# Patient Record
Sex: MALE | Race: WHITE | NOT HISPANIC OR LATINO
[De-identification: names, ages, dates, MRNs, and addresses within clinical notes are randomized per-mention and may not be internally consistent; named-entity substitution may affect disease eponyms.]

---

## 2020-02-26 PROBLEM — Z00.00 ENCOUNTER FOR PREVENTIVE HEALTH EXAMINATION: Status: ACTIVE | Noted: 2020-02-26

## 2020-02-28 ENCOUNTER — APPOINTMENT (OUTPATIENT)
Dept: VASCULAR SURGERY | Facility: CLINIC | Age: 85
End: 2020-02-28

## 2020-03-09 ENCOUNTER — APPOINTMENT (OUTPATIENT)
Dept: VASCULAR SURGERY | Facility: CLINIC | Age: 85
End: 2020-03-09

## 2020-04-23 ENCOUNTER — INPATIENT (INPATIENT)
Facility: HOSPITAL | Age: 85
LOS: 0 days | Discharge: NURSING, REHAB OR LONG-TERM CARE | DRG: 193 | End: 2020-04-24
Attending: INTERNAL MEDICINE | Admitting: INTERNAL MEDICINE
Payer: MEDICARE

## 2020-04-23 VITALS
WEIGHT: 139.99 LBS | HEART RATE: 78 BPM | TEMPERATURE: 99 F | HEIGHT: 69 IN | SYSTOLIC BLOOD PRESSURE: 127 MMHG | OXYGEN SATURATION: 97 % | RESPIRATION RATE: 17 BRPM | DIASTOLIC BLOOD PRESSURE: 78 MMHG

## 2020-04-23 DIAGNOSIS — J96.01 ACUTE RESPIRATORY FAILURE WITH HYPOXIA: ICD-10-CM

## 2020-04-23 DIAGNOSIS — I50.9 HEART FAILURE, UNSPECIFIED: ICD-10-CM

## 2020-04-23 DIAGNOSIS — N18.9 CHRONIC KIDNEY DISEASE, UNSPECIFIED: ICD-10-CM

## 2020-04-23 DIAGNOSIS — Z98.890 OTHER SPECIFIED POSTPROCEDURAL STATES: Chronic | ICD-10-CM

## 2020-04-23 DIAGNOSIS — E78.5 HYPERLIPIDEMIA, UNSPECIFIED: ICD-10-CM

## 2020-04-23 DIAGNOSIS — R09.89 OTHER SPECIFIED SYMPTOMS AND SIGNS INVOLVING THE CIRCULATORY AND RESPIRATORY SYSTEMS: ICD-10-CM

## 2020-04-23 LAB
ALBUMIN SERPL ELPH-MCNC: 3.9 G/DL — SIGNIFICANT CHANGE UP (ref 3.3–5)
ALP SERPL-CCNC: 66 U/L — SIGNIFICANT CHANGE UP (ref 40–120)
ALT FLD-CCNC: 28 U/L — SIGNIFICANT CHANGE UP (ref 10–45)
ANION GAP SERPL CALC-SCNC: 12 MMOL/L — SIGNIFICANT CHANGE UP (ref 5–17)
APTT BLD: 29.2 SEC — SIGNIFICANT CHANGE UP (ref 27.5–36.3)
AST SERPL-CCNC: 30 U/L — SIGNIFICANT CHANGE UP (ref 10–40)
BASOPHILS # BLD AUTO: 0 K/UL — SIGNIFICANT CHANGE UP (ref 0–0.2)
BASOPHILS NFR BLD AUTO: 0 % — SIGNIFICANT CHANGE UP (ref 0–2)
BILIRUB DIRECT SERPL-MCNC: <0.2 MG/DL — SIGNIFICANT CHANGE UP (ref 0–0.2)
BILIRUB INDIRECT FLD-MCNC: >0.1 MG/DL — LOW (ref 0.2–1)
BILIRUB SERPL-MCNC: 0.3 MG/DL — SIGNIFICANT CHANGE UP (ref 0.2–1.2)
BUN SERPL-MCNC: 28 MG/DL — HIGH (ref 7–23)
CALCIUM SERPL-MCNC: 9 MG/DL — SIGNIFICANT CHANGE UP (ref 8.4–10.5)
CHLORIDE SERPL-SCNC: 104 MMOL/L — SIGNIFICANT CHANGE UP (ref 96–108)
CO2 SERPL-SCNC: 22 MMOL/L — SIGNIFICANT CHANGE UP (ref 22–31)
CREAT SERPL-MCNC: 1.5 MG/DL — HIGH (ref 0.5–1.3)
EOSINOPHIL # BLD AUTO: 0 K/UL — SIGNIFICANT CHANGE UP (ref 0–0.5)
EOSINOPHIL NFR BLD AUTO: 0 % — SIGNIFICANT CHANGE UP (ref 0–6)
GLUCOSE SERPL-MCNC: 121 MG/DL — HIGH (ref 70–99)
HCT VFR BLD CALC: 38.9 % — LOW (ref 39–50)
HGB BLD-MCNC: 12.6 G/DL — LOW (ref 13–17)
INR BLD: 0.98 — SIGNIFICANT CHANGE UP (ref 0.88–1.16)
LYMPHOCYTES # BLD AUTO: 0.88 K/UL — LOW (ref 1–3.3)
LYMPHOCYTES # BLD AUTO: 8 % — LOW (ref 13–44)
MCHC RBC-ENTMCNC: 30.3 PG — SIGNIFICANT CHANGE UP (ref 27–34)
MCHC RBC-ENTMCNC: 32.4 GM/DL — SIGNIFICANT CHANGE UP (ref 32–36)
MCV RBC AUTO: 93.5 FL — SIGNIFICANT CHANGE UP (ref 80–100)
MONOCYTES # BLD AUTO: 0.28 K/UL — SIGNIFICANT CHANGE UP (ref 0–0.9)
MONOCYTES NFR BLD AUTO: 2.6 % — SIGNIFICANT CHANGE UP (ref 2–14)
NEUTROPHILS # BLD AUTO: 9.5 K/UL — HIGH (ref 1.8–7.4)
NEUTROPHILS NFR BLD AUTO: 85 % — HIGH (ref 43–77)
PLATELET # BLD AUTO: 208 K/UL — SIGNIFICANT CHANGE UP (ref 150–400)
POTASSIUM SERPL-MCNC: 4.2 MMOL/L — SIGNIFICANT CHANGE UP (ref 3.5–5.3)
POTASSIUM SERPL-SCNC: 4.2 MMOL/L — SIGNIFICANT CHANGE UP (ref 3.5–5.3)
PROCALCITONIN SERPL-MCNC: 0.07 NG/ML — SIGNIFICANT CHANGE UP (ref 0.02–0.1)
PROT SERPL-MCNC: 7.2 G/DL — SIGNIFICANT CHANGE UP (ref 6–8.3)
PROTHROM AB SERPL-ACNC: 11.2 SEC — SIGNIFICANT CHANGE UP (ref 10–12.9)
RBC # BLD: 4.16 M/UL — LOW (ref 4.2–5.8)
RBC # FLD: 15.2 % — HIGH (ref 10.3–14.5)
SARS-COV-2 RNA SPEC QL NAA+PROBE: SIGNIFICANT CHANGE UP
SODIUM SERPL-SCNC: 138 MMOL/L — SIGNIFICANT CHANGE UP (ref 135–145)
WBC # BLD: 10.95 K/UL — HIGH (ref 3.8–10.5)
WBC # FLD AUTO: 10.95 K/UL — HIGH (ref 3.8–10.5)

## 2020-04-23 PROCEDURE — 93010 ELECTROCARDIOGRAM REPORT: CPT

## 2020-04-23 PROCEDURE — 99285 EMERGENCY DEPT VISIT HI MDM: CPT | Mod: CS

## 2020-04-23 PROCEDURE — 71250 CT THORAX DX C-: CPT | Mod: 26

## 2020-04-23 PROCEDURE — 99223 1ST HOSP IP/OBS HIGH 75: CPT | Mod: GC

## 2020-04-23 PROCEDURE — 70490 CT SOFT TISSUE NECK W/O DYE: CPT | Mod: 26

## 2020-04-23 RX ORDER — FAMOTIDINE 10 MG/ML
20 INJECTION INTRAVENOUS DAILY
Refills: 0 | Status: DISCONTINUED | OUTPATIENT
Start: 2020-04-23 | End: 2020-04-23

## 2020-04-23 RX ORDER — PIPERACILLIN AND TAZOBACTAM 4; .5 G/20ML; G/20ML
3.38 INJECTION, POWDER, LYOPHILIZED, FOR SOLUTION INTRAVENOUS ONCE
Refills: 0 | Status: COMPLETED | OUTPATIENT
Start: 2020-04-23 | End: 2020-04-23

## 2020-04-23 RX ORDER — FAMOTIDINE 10 MG/ML
20 INJECTION INTRAVENOUS EVERY 24 HOURS
Refills: 0 | Status: DISCONTINUED | OUTPATIENT
Start: 2020-04-24 | End: 2020-04-24

## 2020-04-23 RX ORDER — FAMOTIDINE 10 MG/ML
10 INJECTION INTRAVENOUS DAILY
Refills: 0 | Status: DISCONTINUED | OUTPATIENT
Start: 2020-04-23 | End: 2020-04-23

## 2020-04-23 RX ORDER — ENOXAPARIN SODIUM 100 MG/ML
40 INJECTION SUBCUTANEOUS EVERY 24 HOURS
Refills: 0 | Status: DISCONTINUED | OUTPATIENT
Start: 2020-04-23 | End: 2020-04-24

## 2020-04-23 RX ADMIN — PIPERACILLIN AND TAZOBACTAM 200 GRAM(S): 4; .5 INJECTION, POWDER, LYOPHILIZED, FOR SOLUTION INTRAVENOUS at 20:10

## 2020-04-23 RX ADMIN — ENOXAPARIN SODIUM 40 MILLIGRAM(S): 100 INJECTION SUBCUTANEOUS at 23:46

## 2020-04-23 NOTE — H&P ADULT - NSICDXPASTMEDICALHX_GEN_ALL_CORE_FT
PAST MEDICAL HISTORY:  BPH (benign prostatic hyperplasia)     CKD (chronic kidney disease)     Heart failure     HLD (hyperlipidemia)     PVD (peripheral vascular disease)

## 2020-04-23 NOTE — H&P ADULT - PROBLEM SELECTOR PLAN 6
home rosuvastatin 10 mg QD on hold   -continue to trend LFT's     DVT ppx: lovenox   Dispo: consult SW in am, f/u speech and swallow evaluation and ENT recs home rosuvastatin 10 mg QD on hold   -continue to trend LFT's     DVT ppx: lovenox   GI ppx: pepcid 10 mg IV QD (renal dosing)   Diet: NPO until speech and swallow evaluation   Dispo: consult [facility]ORK[facility] in am, f/u speech and swallow evaluation and ENT recs

## 2020-04-23 NOTE — H&P ADULT - NSHPREVIEWOFSYSTEMS_GEN_ALL_CORE
Fevers: N  Malaise: N  Myalgias: N  SOB: N          At rest: N         With exertion: N         At baseline: N  Cough: N         Productive: N  Nausea: N  Vomiting: N  Diarrhea: N

## 2020-04-23 NOTE — ED PROVIDER NOTE - CLINICAL SUMMARY MEDICAL DECISION MAKING FREE TEXT BOX
Pt presents s/p ? FB in throat, denies bones, possible aspiration. Hypoxic on RA w/o other signs of resp distress, reports flu-like sx. DDx includes but not limited to retained FB, aspiration, COVID19 infection, dysphagia, less likely other acute pathology. Check labs, CT neck / chest, COVID testing. Dispo pending w/u and clinical status

## 2020-04-23 NOTE — H&P ADULT - PROBLEM SELECTOR PLAN 2
-CT neck: Approximately 2 to 3 mm well-corticated bony density within the right lingual tonsil which may represent potential foreign body versus lingual tonsillar calcification.  -speech and swallow evaluation in am  -ENT following and NTD, observation and no scope indicated at this time since pt currently asymptomatic and at baseline - Isolation precautions; contact and isolation  - covid-19 PCR negative x 1, f/u repeat COVID PCR   - Monitor O2 saturation, monitor for respiratory distress  - Nasal cannula as needed, avoid HFNC/BiPAP  - F/u D-Dimer,  Procalcitonin, CRP  - F/u Quantiferon and G6PD  - holding on initiation of azithromycin/plaquenil at this time   - consider toci based on inflammatory marker trend - Isolation precautions; contact and isolation  - covid-19 PCR negative x 2, repeat COVID PCR in AM  - Monitor O2 saturation, monitor for respiratory distress  - Nasal cannula as needed, avoid HFNC/BiPAP  - F/u D-Dimer,  Procalcitonin, CRP  - F/u Quantiferon and G6PD  - holding on initiation off on plaquenil at this time   - consider toci based on inflammatory marker trend

## 2020-04-23 NOTE — ED ADULT TRIAGE NOTE - HEART RATE (BEATS/MIN)
78 Implemented All Fall with Harm Risk Interventions:  [place] to call system. Call bell, personal items and telephone within reach. Instruct patient to call for assistance. Room bathroom lighting operational. Non-slip footwear when patient is off stretcher. Physically safe environment: no spills, clutter or unnecessary equipment. Stretcher in lowest position, wheels locked, appropriate side rails in place. Provide visual cue, wrist band, yellow gown, etc. Monitor gait and stability. Monitor for mental status changes and reorient to person, place, and time. Review medications for side effects contributing to fall risk. Reinforce activity limits and safety measures with patient and family. Provide visual clues: red socks.

## 2020-04-23 NOTE — H&P ADULT - NSHPLABSRESULTS_GEN_ALL_CORE
12.6   10.95 )-----------( 208      ( 23 Apr 2020 17:40 )             38.9       04-23    138  |  104  |  28<H>  ----------------------------<  121<H>  4.2   |  22  |  1.50<H>    Ca    9.0      23 Apr 2020 17:40        PT/INR - ( 23 Apr 2020 17:40 )   PT: 11.2 sec;   INR: 0.98          PTT - ( 23 Apr 2020 17:40 )  PTT:29.2 sec              EKG:

## 2020-04-23 NOTE — H&P ADULT - PROBLEM SELECTOR PLAN 5
c/w home   -continue to f/u LFTS    DVT ppx:   Dispo: consult [facility]ORK[facility] in am, f/u speech and swallow evaluation and ENT recs Cr 1.5 on admit  -Baseline Cr 1.3  -avoid nephrotoxic agents  -continue to trend CMP  -f/u renal US and post void residual EF unknown  -euvolemic on exam  -core measures, daily weight, strict I& O's

## 2020-04-23 NOTE — ED ADULT NURSE NOTE - OBJECTIVE STATEMENT
pt was eating chicken a few hours ago and felt something stick in his throat , no difficulty breathing ,able to swallow but feels uncomfortable

## 2020-04-23 NOTE — ED ADULT TRIAGE NOTE - CHIEF COMPLAINT QUOTE
Patient, speaking in full sentences, BIBA from [facility], complaining of foreign body in throat.  Per EMS, patient was eating chicken and began to choke and subsequently vomited.  Per EMS, patient was 90% O2 sat on RA, placed on 2L NC.

## 2020-04-23 NOTE — ED PROVIDER NOTE - PHYSICAL EXAMINATION
Limited PE performed in the setting of the COVID10 pandemic, in efforts to limit exposure and cross-contamination  Constitutional: Well appearing, well nourished, awake, alert, oriented to person, place, time/situation and in no apparent distress.  ENMT: Airway patent. No erythema or exudates in oropharynx. No tongue / lip / uvula / pharyngeal / sublingual edema. No oral lesions. Uvula is midline. No drooling or stridor. Normal phonation.   Eyes: Clear bilaterally  Cardiac: Normal rate, regular rhythm.   Respiratory: No increased WOB, tachypnea, hypoxia, or accessory mm use. Pt speaks in full sentences.   Gastrointestinal: Abd soft, NT, ND. No guarding, rebound, or rigidity.   Musculoskeletal: Range of motion is not limited  Neuro: Alert and oriented x 3, face symmetric and speech fluent. Nml gross motor movement, grossly non focal   Skin: Skin normal color for race, warm, dry and intact. No evidence of rash.  Psych: Alert and oriented to person, place, time/situation. normal mood and affect. no apparent risk to self or others. Limited PE performed in the setting of the COVID10 pandemic, in efforts to limit exposure and cross-contamination  Constitutional: Well appearing, well nourished, awake, alert, oriented to person, place, time/situation and in no apparent distress.  ENMT: Airway patent. No erythema or exudates in oropharynx. No tongue / lip / uvula / pharyngeal / sublingual edema. No oral lesions. Uvula is midline. No drooling or stridor. Normal phonation.   Eyes: Clear bilaterally  Cardiac: Normal rate, regular rhythm.   Respiratory: No increased WOB, tachypnea, or accessory mm use. Pt speaks in full sentences. O2 sat on RA 91%, > 96% w/ NC 2 L  Gastrointestinal: Abd soft, NT, ND. No guarding, rebound, or rigidity.   Musculoskeletal: Range of motion is not limited  Neuro: Alert and oriented x 3, face symmetric and speech fluent. Nml gross motor movement, grossly non focal   Skin: Skin normal color for race, warm, dry and intact. No evidence of rash.  Psych: Alert and oriented to person, place, time/situation. normal mood and affect. no apparent risk to self or others.

## 2020-04-23 NOTE — ED PROVIDER NOTE - NS ED ROS FT
Constitutional: No fever or chills.   Eyes: No pain, blurry vision, or discharge.  ENMT: See HPI  Cardiac: No chest pain, SOB or edema. No chest pain with exertion.  Respiratory: No cough or respiratory distress. No hemoptysis. No history of asthma or RAD.  GI: No nausea, vomiting, diarrhea or abdominal pain.  : No dysuria, frequency or burning.  MS: No muscle weakness, joint pain or back pain. See HPI  Neuro: No headache or weakness. No LOC.  Skin: No skin rash.   Endocrine: No history of thyroid disease or diabetes.  Except as documented in the HPI, all other systems are negative.

## 2020-04-23 NOTE — H&P ADULT - ASSESSMENT
85 Y M former smoker with PMHx PVD, HLD, BPH, CKD (baseline Cr 1.3), CHF (EF unknown, pt denies h/o CHF and reports normal ECHO) BIBA from [facility] (permanent resident since 7/2019) w/ FB sensation in throat.  EMS noted on arrival pt vomited and had O2 sat 90%.  Denies odynophagia, dysphagia, SOB, N/V/D, fever, chils, cough, abdominal pain, CP, LE edema, melena or hematuria at this time. Pt also reported 1 week ago he had diffuse bodyaches, w/o other sx, and states he thought he had the flu but was not tested for flu or COVID. CT chest significant for ground glass opacities c/w viral PNA in setting of COVID infection. Pt admitted to [facility] for r/o COVID and speech and swallow eval. Pt is COVID negative x1 and pending repeat COVID PCR. 85 Y M former smoker with PMHx PVD, HLD, BPH, CKD (baseline Cr 1.3), CHF (EF unknown, pt denies h/o CHF and reports normal ECHO) BIBA from [facility] (permanent resident since 7/2019) w/ FB sensation in throat.  EMS noted on arrival pt vomited and had O2 sat 90%.  Denies odynophagia, dysphagia, SOB, N/V/D, fever, chills, cough, abdominal pain, CP, LE edema, melena or hematuria at this time. Pt also reported 1 week ago he had diffuse bodyaches, w/o other sx, and states he thought he had the flu but was not tested for flu or COVID. CT chest significant for ground glass opacities c/w viral PNA in setting of COVID infection. Pt admitted to [facility] for r/o COVID and speech and swallow eval. Pt is COVID negative x1 and pending repeat COVID PCR.

## 2020-04-23 NOTE — H&P ADULT - NSHPSOCIALHISTORY_GEN_ALL_CORE
tobacco, vaping  Etoh  Drug use  [facility] former smoker quit >60 yrs ago   denies ETOH and Drug use  [facility] resident since 7/2019

## 2020-04-23 NOTE — ED PROVIDER NOTE - OBJECTIVE STATEMENT
Pt w/ PMHx PVD, HLD, BPH, CKD, CHF BIBA from [facility] w/ FB sensation in throat. Pt reports he was eating lunch 2-3 hours PTA. Pt reports he was eating cut up chicken in a salad. Pt states there were no bones. Pt felt the chicken get stuck in her throat and felt like he was coughing. He reports suction was used on him and a little chicken came up and he gagged, but denies vomiting. [facility] paperwork does not denote this. [facility] paperwork denotes O2 sat 95% and that pt requested to come to the hospital. EMS noted on arrival pt vomited and had O2 sat 90%. Pt reports since arrival to the ED he is feeling a lot better. Denies odynophagia, dysphagia, and SOB at this time. Pt is able to tolerated drinking water w/o difficulty. Pt reports he has had similar episodes a few times in the past month. Pt reports 1 week ago he had diffuse bodyaches, w/o other sx, and states he thought he had the flu. Pt reports he was never told the dx. Denies f/c, cough, CP, SOB at this time.

## 2020-04-23 NOTE — H&P ADULT - HISTORY OF PRESENT ILLNESS
85 Y M with PMHx PVD, HLD, BPH, CKD (baseline Cr, CHF BIBA from [facility] w/ FB sensation in throat. Pt reports he was eating lunch 2-3 hours prior to arrival. Pt reports he was eating cut up chicken in a salad. Pt states there were no bones. Pt felt the chicken get stuck in his throat and felt like he was coughing. He reports suction was used on him and a little chicken came up and he gagged, but denies vomiting. [facility] paperwork does not denote this. [facility] paperwork denotes O2 sat 95% and that pt requested to come to the hospital. EMS noted on arrival pt vomited and had O2 sat 90%. Pt reports since arrival to the ED he is feeling a lot better. Denies odynophagia, dysphagia, and SOB at this time. Pt is able to tolerated drinking water w/o difficulty. Pt reports he has had similar episodes a few times in the past month. Pt reports 1 week ago he had diffuse bodyaches, w/o other sx, and states he thought he had the flu. Pt reports he was never told the dx. Denies f/c, cough, CP, SOB at this time.    VS on arrival: T 98.6, HR 78, /78, O2 91% on RA, 97% on 2L NC, RR 17  Significant Labs: WBC 10.95, Neutrophils 85 %, Lymphocutes 8%, Bun 28, Cr 1.5, D-dimer 259, Ferritin 708, CRP 0.21  CXR: infiltrates   CT chest: No radiopaque foreign body identified. Groundglass opacities in the right upper, middle and to a lesser extent the left lower lobes, which would be atypical distribution for aspiration pneumonia. This pattern could represent atypical pneumonia/viral infection from atypical agents including COVID-19. Clinical and laboratory correlation is advised. Partially imaged mild to moderate hydronephrosis of the right kidney, the etiology of which is not seen on this exam.  CT neck: Approximately 2 to 3 mm well-corticated bony density within the right lingual tonsil which may represent potential foreign body versus lingual tonsillar calcification.  EKG: 85 Y M with PMHx PVD, HLD, BPH, CKD (baseline Cr unkonwn), CHF (EF unknown) BIBA from [facility] w/ FB sensation in throat. Pt reports he was eating lunch 2-3 hours prior to arrival. Pt reports he was eating cut up chicken in a salad. Pt states there were no bones. Pt felt the chicken get stuck in his throat and felt like he was coughing. He reports suction was used on him and a little chicken came up and he gagged, but denies vomiting. [facility] paperwork does not denote this. [facility] paperwork denotes O2 sat 95% and that pt requested to come to the hospital. EMS noted on arrival pt vomited and had O2 sat 90%. Pt reports since arrival to the ED he is feeling a lot better. Denies odynophagia, dysphagia, and SOB at this time. Pt is able to tolerate drinking water w/o difficulty. Pt reports he has had similar episodes a few times in the past month. Pt reports 1 week ago he had diffuse bodyaches, w/o other sx, and states he thought he had the flu but was not tested for flu or COVID. Denies f/c, cough, CP, SOB at this time. Pt admitted to [facility] for r/o COVID and now COVID negative.     VS on arrival: T 98.6, HR 78, /78, O2 91% on RA, 97% on 2L NC, RR 17  Significant Labs: WBC 10.95, Neutrophils 85 %, Lymphocytes 8%, Bun 28, Cr 1.5, D-dimer 259, Ferritin 708, CRP 0.21  CXR: infiltrates per ED reads   CT chest: No radiopaque foreign body identified. Groundglass opacities in the right upper, middle and to a lesser extent the left lower lobes, which would be atypical distribution for aspiration pneumonia. This pattern could represent atypical pneumonia/viral infection from atypical agents including COVID-19. Clinical and laboratory correlation is advised. Partially imaged mild to moderate hydronephrosis of the right kidney, the etiology of which is not seen on this exam.  CT neck: Approximately 2 to 3 mm well-corticated bony density within the right lingual tonsil which may represent potential foreign body versus lingual tonsillar calcification.  EKG: NSR @ 72 bpm with  85 Y M with PMHx PVD, HLD, BPH, CKD (baseline Cr 1.3), CHF (EF unknown, pt denies h/o CHF and reports normal ECHO) BIBA from [facility] w/ FB sensation in throat. Pt reports he was eating lunch 2-3 hours prior to arrival. Pt reports he was eating cut up chicken in a salad. Pt states there were no bones. Pt felt the chicken get stuck in his throat and felt like he was coughing. He reports suction was used on him and a little chicken came up and he gagged, but denies vomiting. [facility] paperwork does not denote this. [facility] paperwork denotes O2 sat 95% and that pt requested to come to the hospital. EMS noted on arrival pt vomited and had O2 sat 90%. Pt reports since arrival to the ED he is feeling a lot better. Denies odynophagia, dysphagia, and SOB at this time. Pt is able to tolerate drinking water w/o difficulty. Pt reports he has had similar episodes a few times in the past month. Pt reports 1 week ago he had diffuse bodyaches, w/o other sx, and states he thought he had the flu but was not tested for flu or COVID. Denies f/c, cough, CP, SOB at this time. Pt admitted to [facility] for r/o COVID and now COVID negative.     VS on arrival: T 98.6, HR 78, /78, O2 91% on RA, 97% on 2L NC, RR 17  Significant Labs: WBC 10.95, Neutrophils 85 %, Lymphocytes 8%, Bun 28, Cr 1.5, D-dimer 259, Ferritin 708, CRP 0.21  CXR: infiltrates per ED reads   CT chest: No radiopaque foreign body identified. Groundglass opacities in the right upper, middle and to a lesser extent the left lower lobes, which would be atypical distribution for aspiration pneumonia. This pattern could represent atypical pneumonia/viral infection from atypical agents including COVID-19. Clinical and laboratory correlation is advised. Partially imaged mild to moderate hydronephrosis of the right kidney, the etiology of which is not seen on this exam.  CT neck: Approximately 2 to 3 mm well-corticated bony density within the right lingual tonsil which may represent potential foreign body versus lingual tonsillar calcification.  EKG: NSR @ 72 bpm with  85 Y M former smoker with PMHx PVD, HLD, BPH, CKD (baseline Cr 1.3), CHF (EF unknown, pt denies h/o CHF and reports normal ECHO) BIBA from [facility] (permanent resident since 7/2019) w/ FB sensation in throat. Pt reports he was eating lunch 2-3 hours prior to arrival. Pt reports he was eating cut up chicken (without bones) in a salad and felt the chicken get stuck in his throat and started coughing. He reports suction was used on him and a little chicken came up and he gagged, but denies vomiting. [facility] paperwork does not denote this. [facility] paperwork denotes O2 sat 95% and that pt requested to come to the hospital. EMS noted on arrival pt vomited and had O2 sat 90%. Pt reports since arrival to the ED he is feeling a lot better. Denies odynophagia, dysphagia, SOB, N/V/D, fever, chils, cough, abdominal pain, CP, LE edema, melena or hematuria at this time. Pt has not attempted to swallow anything since choking episode. Pt reports he has had similar episodes a few times in the past month that he would clear and swallow within seconds.  Pt also reported 1 week ago he had diffuse bodyaches, w/o other sx, and states he thought he had the flu but was not tested for flu or COVID. Pt admitted to [facility] for r/o COVID and now COVID negative pending repeat COVID PCR.     VS on arrival: T 98.6, HR 78, /78, O2 91% on RA, 97% on 2L NC, RR 17  Significant Labs: WBC 10.95, Neutrophils 85 %, Lymphocytes 8%, Bun 28, Cr 1.5, D-dimer 259, Ferritin 708, CRP 0.21   CT chest: No radiopaque foreign body identified. Groundglass opacities in the right upper, middle and to a lesser extent the left lower lobes, which would be atypical distribution for aspiration pneumonia. This pattern could represent atypical pneumonia/viral infection from atypical agents including COVID-19. Clinical and laboratory correlation is advised. Partially imaged mild to moderate hydronephrosis of the right kidney, the etiology of which is not seen on this exam.  CT neck: Approximately 2 to 3 mm well-corticated bony density within the right lingual tonsil which may represent potential foreign body versus lingual tonsillar calcification.  EKG: NSR @ 72 bpm with   Treatment: s/p zosyn 3.375 mg IV x1 85 Y M former smoker with PMHx PVD, HLD, BPH, CKD (baseline Cr 1.3), CHF (EF unknown, pt denies h/o CHF and reports normal ECHO) BIBA from [facility] (permanent resident since 7/2019) w/ FB sensation in throat. Pt reports he was eating lunch 2-3 hours prior to arrival. Pt reports he was eating cut up chicken (without bones) in a salad and felt the chicken get stuck in his throat and started coughing. He reports suction was used on him and a little chicken came up and he gagged, but denies vomiting. [facility] paperwork does not denote this. [facility] paperwork denotes O2 sat 95% and that pt requested to come to the hospital. EMS noted on arrival pt vomited and had O2 sat 90%. Pt reports since arrival to the ED he is feeling a lot better. Denies odynophagia, dysphagia, SOB, N/V/D, fever, chills, cough, abdominal pain, CP, LE edema, melena or hematuria at this time. Pt has not attempted to swallow anything since choking episode. Pt reports he has had similar episodes a few times in the past month that he would clear and swallow within seconds.  Pt also reported 1 week ago he had diffuse bodyaches, w/o other sx, and states he thought he had the flu but was not tested for flu or COVID. Pt admitted to [facility] for r/o COVID and now COVID negative pending repeat COVID PCR.     VS on arrival: T 98.6, HR 78, /78, O2 91% on RA, 97% on 2L NC, RR 17  Significant Labs: WBC 10.95, Neutrophils 85 %, Lymphocytes 8%, Bun 28, Cr 1.5, D-dimer 259, Ferritin 708, CRP 0.21   CT chest: No radiopaque foreign body identified. Groundglass opacities in the right upper, middle and to a lesser extent the left lower lobes, which would be atypical distribution for aspiration pneumonia. This pattern could represent atypical pneumonia/viral infection from atypical agents including COVID-19. Clinical and laboratory correlation is advised. Partially imaged mild to moderate hydronephrosis of the right kidney, the etiology of which is not seen on this exam.  CT neck: Approximately 2 to 3 mm well-corticated bony density within the right lingual tonsil which may represent potential foreign body versus lingual tonsillar calcification.  EKG: NSR @ 72 bpm with   Treatment: s/p zosyn 3.375 mg IV x1

## 2020-04-23 NOTE — H&P ADULT - PROBLEM SELECTOR PLAN 3
EF unknown  -euvolemic on exam  -core measures, daily weight, strict I& O's pt reports ongoing episodes over past month, lasting seconds before clearing on own   -CT neck: Approximately 2 to 3 mm well-corticated bony density within the right lingual tonsil which may represent potential foreign body versus lingual tonsillar calcification.  -NPO until speech and swallow evaluation   -ENT following and NTD, observation and no scope indicated at this time since pt currently asymptomatic and at baseline

## 2020-04-23 NOTE — H&P ADULT - PROBLEM SELECTOR PLAN 4
Cr 1.5 on admit  -Baseline unknown  -avoid nephrotoxic agents  -continue to trend CMP EF unknown  -euvolemic on exam  -core measures, daily weight, strict I& O's With incidental Rt hydronephrosis on CT.   -Cr 1.5 on admit  -Baseline Cr 1.3  -avoid nephrotoxic agents  -continue to trend CMP  -f/u renal US and post void residual in setting of BPH

## 2020-04-23 NOTE — H&P ADULT - PROBLEM SELECTOR PLAN 1
- Isolation precautions; contact and isolation  - covid-19 PCR negative x 1  - Monitor O2 saturation, monitor for respiratory distress  - Nasal cannula as needed, avoid HFNC/BiPAP  - Tylenol 650mg PRN for fever   - F/u D-Dimer,  Procalcitonin, CRP  - F/u Quantiferon and G6PD 2/2 suspected COVID infection   -with viral PNA 2/2 suspected COVID infection  -CT chest: Groundglass opacities in the right upper, middle and to a lesser extent the left lower lobes, which would be atypical distribution for aspiration pneumonia. This pattern could represent atypical pneumonia/viral infection from atypical agents including COVID-19. Clinical and laboratory correlation is advised.   -satting 97% on 2L NC without any signs of respiratory distress -with viral PNA 2/2 suspected COVID infection   -CT chest: Groundglass opacities in the right upper, middle and to a lesser extent the left lower lobes, which would be atypical distribution for aspiration pneumonia. This pattern could represent atypical pneumonia/viral infection from atypical agents including COVID-19. Clinical and laboratory correlation is advised.   -on arrival per EMS satting 91% on RA, currently satting 97% on 2L NC without any signs of respiratory distress 2/t PNA with high suspicion for sars-cov2 infection. Unlikely aspiration PNA given distribution. Other bacterial infection on DDx as well.   -CT chest: Groundglass opacities in the right upper, middle and to a lesser extent the left lower lobes, which would be atypical distribution for aspiration pneumonia. This pattern could represent atypical pneumonia/viral infection from atypical agents including COVID-19. Clinical and laboratory correlation is advised.   -on arrival per EMS satting 91% on RA, currently satting 97% on 2L NC without any signs of respiratory distress  -IV ceftriaxone/azithromycin for now 2/t PNA with high suspicion for sars-cov2 infection. Unlikely aspiration PNA given distribution. Other bacterial infection on DDx as well.   -CT chest: Groundglass opacities in the right upper, middle and to a lesser extent the left lower lobes, which would be atypical distribution for aspiration pneumonia. This pattern could represent atypical pneumonia/viral infection from atypical agents including COVID-19. Clinical and laboratory correlation is advised.   -on arrival per EMS satting 91% on RA, currently satting 97% on 2L NC without any signs of respiratory distress  -IV ceftriaxone/azithromycin for now  -F/u repeat covid pcr and RVP

## 2020-04-23 NOTE — ED PROVIDER NOTE - CARE PLAN
Principal Discharge DX:	Viral illness  Secondary Diagnosis:	Foreign body sensation in throat  Secondary Diagnosis:	Hypoxia

## 2020-04-23 NOTE — ED PROVIDER NOTE - PROGRESS NOTE DETAILS
D/w ENT - given possible COVID, no scope at this time. Observe. They will follow. if decompensates will scope if COVID neg.

## 2020-04-23 NOTE — ED ADULT NURSE NOTE - PMH
BPH (benign prostatic hyperplasia)    CKD (chronic kidney disease)    Heart failure    HLD (hyperlipidemia)    PVD (peripheral vascular disease)

## 2020-04-23 NOTE — ED ADULT NURSE NOTE - CHIEF COMPLAINT QUOTE
Patient, speaking in full sentences, BIBA from [facility], complaining of foreign body in throat.  Per EMS, patient was eating chicken and began to choke and subsequently vomited.  Per EMS, patient was 90% O2 sat on RA, placed on 2L NC.

## 2020-04-24 ENCOUNTER — TRANSCRIPTION ENCOUNTER (OUTPATIENT)
Age: 85
End: 2020-04-24

## 2020-04-24 VITALS
SYSTOLIC BLOOD PRESSURE: 109 MMHG | HEART RATE: 59 BPM | OXYGEN SATURATION: 99 % | RESPIRATION RATE: 17 BRPM | TEMPERATURE: 98 F | DIASTOLIC BLOOD PRESSURE: 67 MMHG

## 2020-04-24 DIAGNOSIS — N18.9 CHRONIC KIDNEY DISEASE, UNSPECIFIED: ICD-10-CM

## 2020-04-24 DIAGNOSIS — R09.02 HYPOXEMIA: ICD-10-CM

## 2020-04-24 LAB
ALBUMIN SERPL ELPH-MCNC: 3.8 G/DL — SIGNIFICANT CHANGE UP (ref 3.3–5)
ALP SERPL-CCNC: 68 U/L — SIGNIFICANT CHANGE UP (ref 40–120)
ALT FLD-CCNC: 26 U/L — SIGNIFICANT CHANGE UP (ref 10–45)
ANION GAP SERPL CALC-SCNC: 15 MMOL/L — SIGNIFICANT CHANGE UP (ref 5–17)
AST SERPL-CCNC: 23 U/L — SIGNIFICANT CHANGE UP (ref 10–40)
BASOPHILS # BLD AUTO: 0.03 K/UL — SIGNIFICANT CHANGE UP (ref 0–0.2)
BASOPHILS NFR BLD AUTO: 0.4 % — SIGNIFICANT CHANGE UP (ref 0–2)
BILIRUB SERPL-MCNC: 0.4 MG/DL — SIGNIFICANT CHANGE UP (ref 0.2–1.2)
BUN SERPL-MCNC: 27 MG/DL — HIGH (ref 7–23)
CALCIUM SERPL-MCNC: 9.5 MG/DL — SIGNIFICANT CHANGE UP (ref 8.4–10.5)
CHLORIDE SERPL-SCNC: 105 MMOL/L — SIGNIFICANT CHANGE UP (ref 96–108)
CO2 SERPL-SCNC: 22 MMOL/L — SIGNIFICANT CHANGE UP (ref 22–31)
CREAT SERPL-MCNC: 1.51 MG/DL — HIGH (ref 0.5–1.3)
CRP SERPL-MCNC: 0.32 MG/DL — SIGNIFICANT CHANGE UP (ref 0–0.4)
D DIMER BLD IA.RAPID-MCNC: 263 NG/ML DDU — HIGH
EOSINOPHIL # BLD AUTO: 0.03 K/UL — SIGNIFICANT CHANGE UP (ref 0–0.5)
EOSINOPHIL NFR BLD AUTO: 0.4 % — SIGNIFICANT CHANGE UP (ref 0–6)
FERRITIN SERPL-MCNC: 738 NG/ML — HIGH (ref 30–400)
GLUCOSE BLDC GLUCOMTR-MCNC: 105 MG/DL — HIGH (ref 70–99)
GLUCOSE BLDC GLUCOMTR-MCNC: 94 MG/DL — SIGNIFICANT CHANGE UP (ref 70–99)
GLUCOSE SERPL-MCNC: 83 MG/DL — SIGNIFICANT CHANGE UP (ref 70–99)
HCT VFR BLD CALC: 40.3 % — SIGNIFICANT CHANGE UP (ref 39–50)
HGB BLD-MCNC: 12.7 G/DL — LOW (ref 13–17)
IMM GRANULOCYTES NFR BLD AUTO: 2.3 % — HIGH (ref 0–1.5)
LYMPHOCYTES # BLD AUTO: 1.58 K/UL — SIGNIFICANT CHANGE UP (ref 1–3.3)
LYMPHOCYTES # BLD AUTO: 21 % — SIGNIFICANT CHANGE UP (ref 13–44)
MAGNESIUM SERPL-MCNC: 2.6 MG/DL — SIGNIFICANT CHANGE UP (ref 1.6–2.6)
MCHC RBC-ENTMCNC: 30.2 PG — SIGNIFICANT CHANGE UP (ref 27–34)
MCHC RBC-ENTMCNC: 31.5 GM/DL — LOW (ref 32–36)
MCV RBC AUTO: 96 FL — SIGNIFICANT CHANGE UP (ref 80–100)
MONOCYTES # BLD AUTO: 0.68 K/UL — SIGNIFICANT CHANGE UP (ref 0–0.9)
MONOCYTES NFR BLD AUTO: 9 % — SIGNIFICANT CHANGE UP (ref 2–14)
NEUTROPHILS # BLD AUTO: 5.05 K/UL — SIGNIFICANT CHANGE UP (ref 1.8–7.4)
NEUTROPHILS NFR BLD AUTO: 66.9 % — SIGNIFICANT CHANGE UP (ref 43–77)
NRBC # BLD: 0 /100 WBCS — SIGNIFICANT CHANGE UP (ref 0–0)
PHOSPHATE SERPL-MCNC: 3.1 MG/DL — SIGNIFICANT CHANGE UP (ref 2.5–4.5)
PLATELET # BLD AUTO: 218 K/UL — SIGNIFICANT CHANGE UP (ref 150–400)
POTASSIUM SERPL-MCNC: 4 MMOL/L — SIGNIFICANT CHANGE UP (ref 3.5–5.3)
POTASSIUM SERPL-SCNC: 4 MMOL/L — SIGNIFICANT CHANGE UP (ref 3.5–5.3)
PROT SERPL-MCNC: 7.3 G/DL — SIGNIFICANT CHANGE UP (ref 6–8.3)
RAPID RVP RESULT: SIGNIFICANT CHANGE UP
RBC # BLD: 4.2 M/UL — SIGNIFICANT CHANGE UP (ref 4.2–5.8)
RBC # FLD: 15.8 % — HIGH (ref 10.3–14.5)
SARS-COV-2 RNA SPEC QL NAA+PROBE: SIGNIFICANT CHANGE UP
SARS-COV-2 RNA SPEC QL NAA+PROBE: SIGNIFICANT CHANGE UP
SODIUM SERPL-SCNC: 142 MMOL/L — SIGNIFICANT CHANGE UP (ref 135–145)
WBC # BLD: 7.54 K/UL — SIGNIFICANT CHANGE UP (ref 3.8–10.5)
WBC # FLD AUTO: 7.54 K/UL — SIGNIFICANT CHANGE UP (ref 3.8–10.5)

## 2020-04-24 PROCEDURE — 87581 M.PNEUMON DNA AMP PROBE: CPT

## 2020-04-24 PROCEDURE — 82962 GLUCOSE BLOOD TEST: CPT

## 2020-04-24 PROCEDURE — 85610 PROTHROMBIN TIME: CPT

## 2020-04-24 PROCEDURE — 82728 ASSAY OF FERRITIN: CPT

## 2020-04-24 PROCEDURE — 86480 TB TEST CELL IMMUN MEASURE: CPT

## 2020-04-24 PROCEDURE — 83735 ASSAY OF MAGNESIUM: CPT

## 2020-04-24 PROCEDURE — 85379 FIBRIN DEGRADATION QUANT: CPT

## 2020-04-24 PROCEDURE — 99285 EMERGENCY DEPT VISIT HI MDM: CPT

## 2020-04-24 PROCEDURE — 80048 BASIC METABOLIC PNL TOTAL CA: CPT

## 2020-04-24 PROCEDURE — 87633 RESP VIRUS 12-25 TARGETS: CPT

## 2020-04-24 PROCEDURE — 82955 ASSAY OF G6PD ENZYME: CPT

## 2020-04-24 PROCEDURE — 93005 ELECTROCARDIOGRAM TRACING: CPT

## 2020-04-24 PROCEDURE — 99233 SBSQ HOSP IP/OBS HIGH 50: CPT | Mod: GC

## 2020-04-24 PROCEDURE — 80053 COMPREHEN METABOLIC PANEL: CPT

## 2020-04-24 PROCEDURE — 71250 CT THORAX DX C-: CPT

## 2020-04-24 PROCEDURE — 36415 COLL VENOUS BLD VENIPUNCTURE: CPT

## 2020-04-24 PROCEDURE — 84145 PROCALCITONIN (PCT): CPT

## 2020-04-24 PROCEDURE — 84100 ASSAY OF PHOSPHORUS: CPT

## 2020-04-24 PROCEDURE — 87635 SARS-COV-2 COVID-19 AMP PRB: CPT

## 2020-04-24 PROCEDURE — 85730 THROMBOPLASTIN TIME PARTIAL: CPT

## 2020-04-24 PROCEDURE — 85025 COMPLETE CBC W/AUTO DIFF WBC: CPT

## 2020-04-24 PROCEDURE — 87486 CHLMYD PNEUM DNA AMP PROBE: CPT

## 2020-04-24 PROCEDURE — 80076 HEPATIC FUNCTION PANEL: CPT

## 2020-04-24 PROCEDURE — 86140 C-REACTIVE PROTEIN: CPT

## 2020-04-24 PROCEDURE — 87798 DETECT AGENT NOS DNA AMP: CPT

## 2020-04-24 PROCEDURE — 70490 CT SOFT TISSUE NECK W/O DYE: CPT

## 2020-04-24 PROCEDURE — 92610 EVALUATE SWALLOWING FUNCTION: CPT

## 2020-04-24 PROCEDURE — 87040 BLOOD CULTURE FOR BACTERIA: CPT

## 2020-04-24 RX ORDER — AZITHROMYCIN 500 MG/1
1 TABLET, FILM COATED ORAL
Qty: 0 | Refills: 0 | DISCHARGE
Start: 2020-04-24

## 2020-04-24 RX ORDER — CEFTRIAXONE 500 MG/1
1000 INJECTION, POWDER, FOR SOLUTION INTRAMUSCULAR; INTRAVENOUS EVERY 24 HOURS
Refills: 0 | Status: DISCONTINUED | OUTPATIENT
Start: 2020-04-24 | End: 2020-04-24

## 2020-04-24 RX ORDER — AZITHROMYCIN 500 MG/1
250 TABLET, FILM COATED ORAL DAILY
Refills: 0 | Status: DISCONTINUED | OUTPATIENT
Start: 2020-04-24 | End: 2020-04-24

## 2020-04-24 RX ADMIN — CEFTRIAXONE 100 MILLIGRAM(S): 500 INJECTION, POWDER, FOR SOLUTION INTRAMUSCULAR; INTRAVENOUS at 02:37

## 2020-04-24 RX ADMIN — FAMOTIDINE 20 MILLIGRAM(S): 10 INJECTION INTRAVENOUS at 07:01

## 2020-04-24 RX ADMIN — AZITHROMYCIN 250 MILLIGRAM(S): 500 TABLET, FILM COATED ORAL at 02:37

## 2020-04-24 NOTE — SWALLOW BEDSIDE ASSESSMENT ADULT - SLP GENERAL OBSERVATIONS
Pt received awake and alert, pleasant, in NAD.  (+) NC for O2 supplementation.  Pt followed commands and engaged in conversation with clinician.  (+) mild to moderate dysphonia

## 2020-04-24 NOTE — SWALLOW BEDSIDE ASSESSMENT ADULT - SLP PERTINENT HISTORY OF CURRENT PROBLEM
Pt reported sensation of food stasis in the throat on rare occasions over the past many years, noted 3 episodes of food stasis in pharynx over the past 10 days,.  First 2 episodes were "mild" and passed after few seconds.  3rd episode which brought pt to ED cleared after 2 hours.

## 2020-04-24 NOTE — DISCHARGE NOTE PROVIDER - HOSPITAL COURSE
85 Y M former smoker with PMHx PVD, HLD, BPH, CKD (baseline Cr 1.3), CHF (EF unknown, pt denies h/o CHF and reports normal ECHO) BIBA from [facility] (permanent resident since 7/2019) w/ FB sensation in throat after feeling like a piece of chicken got stuck in his throat while eating lunch 2-3 hours prior to arrival. He reports suction was used on him and a little chicken came up and he gagged, but denies vomiting. EMS noted on arrival pt vomited and had O2 sat 90%.  On arrival to the ED, he reported feeling much better.  He also reported diffuse bodyaches, w/o other sx, 1 week ago. CT neck showed approximately 2 to 3 mm well-corticated bony density within the right lingual tonsil which may represent potential foreign body versus lingual tonsillar calcification.  Seen by ENT - no scope indicated as patient is currently asymptomatic.  CT chest - no radiopaque foreign body identified, groundglass opacities in the right upper, middle and to a lesser extent the left lower lobes, could represent atypical pneumonia/viral infection from atypical agents including COVID-19, partially imaged mild to moderate hydronephrosis of the right kidney.  He was admitted to [facility] for r/o COVID.  COVID negative x 2, RVP negative. Repeat COVID ____.  02 sat 97% on room air.        # Acute hypoxemic respiratory failure    - CT chest: Groundglass opacities in the right upper, middle and to a lesser extent the left lower lobes, which would be atypical distribution for aspiration pneumonia. This pattern could represent atypical pneumonia/viral infection from atypical agents including COVID-19.     - On arrival per EMS satting 90% on RA    - Currently satting 97% on RA without any signs of respiratory distress    - S/p Ceftriaxone 1g IV x 1    - Azithromycin 250 mg daily x 5 days (4/24 - 4/28)    - RVP negative    - COVID ___        # R/O 2019 novel coronavirus disease (COVID-19).  Plan: - Isolation precautions; contact and isolation    - covid-19 PCR negative x 2, repeat COVID PCR ____    - Monitor O2 saturation, monitor for respiratory distress    - Nasal cannula as needed, avoid HFNC/BiPAP    - D-dimer 263    - Ferritin 738    - CRP 0.32        # Foreign body sensation in throat.  Plan: pt reports ongoing episodes over past month, lasting seconds before clearing on own     - CT neck: Approximately 2 to 3 mm well-corticated bony density within the right lingual tonsil which may represent potential foreign body versus lingual tonsillar calcification.    - ENT following and NTD, observation and no scope indicated at this time since pt currently asymptomatic and at baseline.     - Speech and swallow eval _____        # CKD (chronic kidney disease).    - Incidental RIGHT hydronephrosis on CT.     - Creatinine 1.5 (baseline Cr 1.3)    - Renal US _____         # Heart failure.    - EF unknown    - Euvolemic on exam        # Hyperlipidemia    - resume home rosuvastatin 10 mg QD         New medications:     Azithromycin 250 mg PO daily x 5 days (4/24 - 4/28) 85 Y M former smoker with PMHx PVD, HLD, BPH, CKD (baseline Cr 1.3), CHF (EF unknown, pt denies h/o CHF and reports normal ECHO) BIBA from [facility] (permanent resident since 7/2019) w/ FB sensation in throat.  He reported feeling like a piece of chicken got stuck in his throat while eating lunch 2-3 hours prior to arrival. He reports suction was used on him and a little chicken came up and he gagged, but denies vomiting. EMS noted on arrival pt vomited and had O2 sat 90%.  On arrival to the ED, he reported feeling much better.  He also reported diffuse bodyaches w/o other sx 1 week ago, thought he had the flu, states he was never tested for flu or COVID.  CT neck showed approximately 2 to 3 mm well-corticated bony density within the right lingual tonsil which may represent potential foreign body versus lingual tonsillar calcification.  He was seen by ENT - no scope indicated as he was currently asymptomatic and at baseline.  CT chest - no radiopaque foreign body identified, groundglass opacities in the right upper, middle and to a lesser extent the left lower lobes, could represent atypical pneumonia/viral infection from atypical agents including COVID-19, partially imaged mild to moderate hydronephrosis of the right kidney.  He was admitted to [facility] for r/o COVID.  COVID PCR negative x 2, RVP negative. Repeat COVID PCR ____.  02 sat currently 97% on room without any signs of respiratory distress.         # Acute hypoxemic respiratory failure    - On arrival per EMS satting 90% on RA    - 02 sat currently 97% on RA without any signs of respiratory distress    - CT chest: Groundglass opacities in the right upper, middle and to a lesser extent the left lower lobes, which would be atypical distribution for aspiration pneumonia. This pattern could represent atypical pneumonia/viral infection from atypical agents including COVID-19.     - RVP negative    - COVID PCR ___    - S/p Ceftriaxone 1g IV x 1    - Azithromycin 250 mg daily x 5 days (4/24 - 4/28)        # R/O 2019 novel coronavirus disease (COVID-19).  Plan: - Isolation precautions; contact and isolation    - Covid-19 PCR negative x 2, repeat COVID PCR ____    - 02 sat currently 97% on RA without any signs of respiratory distress    - D-dimer 263    - Ferritin 738    - CRP 0.32        # Foreign body sensation in throat.      - CT neck: Approximately 2 to 3 mm well-corticated bony density within the right lingual tonsil which may represent potential foreign body versus lingual tonsillar calcification.    - ENT following and NTD, observation and no scope indicated at this time since pt currently asymptomatic and at baseline.     - Speech and swallow eval _____        # CKD (chronic kidney disease).    - Incidental RIGHT hydronephrosis on CT.     - Creatinine 1.5 (baseline Cr 1.3)    - Renal US _____         # Heart failure.    - EF unknown    - Euvolemic on exam        # Hyperlipidemia    - Resume home rosuvastatin 10 mg QD         New medications:     Azithromycin 250 mg PO daily x 5 days (4/24 - 4/28) 85 Y M former smoker with PMHx PVD, HLD, BPH, CKD (baseline Cr 1.3), CHF (EF unknown, pt denies h/o CHF and reports normal ECHO) BIBA from [facility] (permanent resident since 7/2019) w/ FB sensation in throat.  He reported feeling like a piece of chicken got stuck in his throat while eating lunch 2-3 hours prior to arrival. He reports suction was used on him and a little chicken came up and he gagged, but denies vomiting. EMS noted on arrival pt vomited and had O2 sat 90%.  On arrival to the ED, he reported feeling much better.  He also reported diffuse bodyaches w/o other sx 1 week ago, thought he had the flu, states he was never tested for flu or COVID.  CT neck showed approximately 2 to 3 mm well-corticated bony density within the right lingual tonsil which may represent potential foreign body versus lingual tonsillar calcification.  He was seen by ENT - no scope indicated as he was currently asymptomatic and at baseline.  CT chest - no radiopaque foreign body identified, groundglass opacities in the right upper, middle and to a lesser extent the left lower lobes, could represent atypical pneumonia/viral infection from atypical agents including COVID-19, partially imaged mild to moderate hydronephrosis of the right kidney.  He was admitted to [facility] for r/o COVID.  COVID PCR negative x 2, RVP negative. Repeat COVID PCR ____.  02 sat currently 97% on room air without any signs of respiratory distress.         # Acute hypoxemic respiratory failure    - On arrival per EMS satting 90% on RA    - 02 sat currently 97% on RA without any signs of respiratory distress    - CT chest: Groundglass opacities in the right upper, middle and to a lesser extent the left lower lobes, which would be atypical distribution for aspiration pneumonia. This pattern could represent atypical pneumonia/viral infection from atypical agents including COVID-19.     - RVP negative    - COVID PCR ___    - S/p Ceftriaxone 1g IV x 1    - Azithromycin 250 mg daily x 5 days (4/24 - 4/28)        # R/O 2019 novel coronavirus disease (COVID-19).  Plan: - Isolation precautions; contact and isolation    - Covid-19 PCR negative x 2, repeat COVID PCR ____    - 02 sat currently 97% on RA without any signs of respiratory distress    - D-dimer 263    - Ferritin 738    - CRP 0.32        # Foreign body sensation in throat.      - CT neck: Approximately 2 to 3 mm well-corticated bony density within the right lingual tonsil which may represent potential foreign body versus lingual tonsillar calcification.    - ENT following and NTD, observation and no scope indicated at this time since pt currently asymptomatic and at baseline.     - Speech and swallow eval - Pt presents with functional oral and pharyngeal stages of swallowing with no overt signs & symptoms of airway protection deficits across consistencies tested.  Unclear if pt's reported sensation of pharyngeal stasis with solid food ( 3x in past 10 days) may be a referred sensation of esophageal dysmotility/disorder.  Further GI evaluation may be warranted if this persists.  Recommendations: mechanical soft diet with thin liquids, allow for swallow between intakes, maintain upright posture during/after eating for 30 minutes, oral hygiene, position upright (90 degrees), small sips/bites, GI consult if dysphagia symptoms persist ( r/o esophageal dysphagia).         #CKD (chronic kidney disease).    - Incidental RIGHT hydronephrosis on CT.     - Creatinine 1.5 (baseline Cr 1.3)    - Renal US _____         # Heart failure.    - EF unknown    - Euvolemic on exam        # Hyperlipidemia    - Resume home rosuvastatin 10 mg QD         New medications:     Azithromycin 250 mg PO daily x 5 days (4/24 - 4/28) 85 Y M former smoker with PMHx PVD, HLD, BPH, CKD (baseline Cr 1.3), CHF (EF unknown, pt denies h/o CHF and reports normal ECHO) BIBA from [facility] (permanent resident since 7/2019) w/ FB sensation in throat.  He reported feeling like a piece of chicken got stuck in his throat while eating lunch 2-3 hours prior to arrival. He reports suction was used on him and a little chicken came up and he gagged, but denies vomiting. EMS noted on arrival pt vomited and had O2 sat 90%.  On arrival to the ED, he reported feeling much better.  He also reported diffuse bodyaches w/o other sx 1 week ago, thought he had the flu, states he was never tested for flu or COVID.  CT neck showed approximately 2 to 3 mm well-corticated bony density within the right lingual tonsil which may represent potential foreign body versus lingual tonsillar calcification.  He was seen by ENT - no scope indicated as he was currently asymptomatic and at baseline.  CT chest - no radiopaque foreign body identified, groundglass opacities in the right upper, middle and to a lesser extent the left lower lobes, could represent atypical pneumonia/viral infection from atypical agents including COVID-19, partially imaged mild to moderate hydronephrosis of the right kidney.  He was admitted to [facility] for r/o COVID.  COVID PCR negative x 2, RVP negative. Repeat COVID PCR ____.  S/p ceftriaxone 1g IV x 1.  Started on 5-day course of azithromycin (4/24-4/28).  02 sat currently 97% on room air without any signs of respiratory distress.         # Acute hypoxemic respiratory failure    - On arrival per EMS satting 90% on RA    - 02 sat currently 97% on RA without any signs of respiratory distress    - CT chest: Groundglass opacities in the right upper, middle and to a lesser extent the left lower lobes, which would be atypical distribution for aspiration pneumonia. This pattern could represent atypical pneumonia/viral infection from atypical agents including COVID-19.     - RVP negative    - COVID PCR ___    - S/p Ceftriaxone 1g IV x 1    - Azithromycin 250 mg daily x 5 days (4/24 - 4/28)        # R/O 2019 novel coronavirus disease (COVID-19).  Plan: - Isolation precautions; contact and isolation    - Covid-19 PCR negative x 2, repeat COVID PCR ____    - 02 sat currently 97% on RA without any signs of respiratory distress    - D-dimer 263    - Ferritin 738    - CRP 0.32        # Foreign body sensation in throat.      - CT neck: Approximately 2 to 3 mm well-corticated bony density within the right lingual tonsil which may represent potential foreign body versus lingual tonsillar calcification.    - ENT following and NTD, observation and no scope indicated at this time since pt currently asymptomatic and at baseline.     - Speech and swallow eval - Pt presents with functional oral and pharyngeal stages of swallowing with no overt signs & symptoms of airway protection deficits across consistencies tested.  Unclear if pt's reported sensation of pharyngeal stasis with solid food ( 3x in past 10 days) may be a referred sensation of esophageal dysmotility/disorder.  Further GI evaluation may be warranted if this persists.  Recommendations: mechanical soft diet with thin liquids, allow for swallow between intakes, maintain upright posture during/after eating for 30 minutes, oral hygiene, position upright (90 degrees), small sips/bites, GI consult if dysphagia symptoms persist ( r/o esophageal dysphagia).         #CKD (chronic kidney disease).    - Incidental RIGHT hydronephrosis on CT.     - Creatinine 1.5 (baseline Cr 1.3)    - Renal US _____         # Heart failure.    - EF unknown    - Euvolemic on exam        # Hyperlipidemia    - Resume home rosuvastatin 10 mg QD         New medications:     Azithromycin 250 mg PO daily x 5 days (4/24 - 4/28) 85 Y M former smoker with PMHx PVD, HLD, BPH, CKD (baseline Cr 1.3), CHF (EF unknown, pt denies h/o CHF and reports normal ECHO) BIBA from [facility] (permanent resident since 7/2019) w/ FB sensation in throat.  He reported feeling like a piece of chicken got stuck in his throat while eating lunch 2-3 hours prior to arrival. He reports suction was used on him and a little chicken came up and he gagged, but denies vomiting. EMS noted on arrival pt vomited and had O2 sat 90%.  On arrival to the ED, he reported feeling much better.  He also reported diffuse bodyaches w/o other sx 1 week ago, thought he had the flu, states he was never tested for flu or COVID.  CT neck showed approximately 2 to 3 mm well-corticated bony density within the right lingual tonsil which may represent potential foreign body versus lingual tonsillar calcification.  He was seen by ENT - no scope indicated as he was currently asymptomatic and at baseline.  CT chest - no radiopaque foreign body identified, groundglass opacities in the right upper, middle and to a lesser extent the left lower lobes, could represent atypical pneumonia/viral infection from atypical agents including COVID-19, partially imaged mild to moderate hydronephrosis of the right kidney.  He was admitted to [facility] for r/o COVID.  COVID PCR negative x 2, RVP negative. Repeat COVID PCR ____.  Started on ceftriaxone and azithromycin.  02 sat currently 97% on room air without any signs of respiratory distress.         # Acute hypoxemic respiratory failure    - On arrival per EMS satting 90% on RA    - 02 sat currently 97% on RA without any signs of respiratory distress    - CT chest: Groundglass opacities in the right upper, middle and to a lesser extent the left lower lobes, which would be atypical distribution for aspiration pneumonia. This pattern could represent atypical pneumonia/viral infection from atypical agents including COVID-19.     - RVP negative    - COVID PCR ___    - S/p Ceftriaxone 1g IV x 1    - Azithromycin 250 mg daily x 5 days (4/24 - 4/28)        # R/O 2019 novel coronavirus disease (COVID-19).  Plan: - Isolation precautions; contact and isolation    - Covid-19 PCR negative x 2, repeat COVID PCR ____    - 02 sat currently 97% on RA without any signs of respiratory distress    - D-dimer 263    - Ferritin 738    - CRP 0.32        # Foreign body sensation in throat.      - CT neck: Approximately 2 to 3 mm well-corticated bony density within the right lingual tonsil which may represent potential foreign body versus lingual tonsillar calcification.    - ENT following and NTD, observation and no scope indicated at this time since pt currently asymptomatic and at baseline.     - Speech and swallow eval - Pt presents with functional oral and pharyngeal stages of swallowing with no overt signs & symptoms of airway protection deficits across consistencies tested.  Unclear if pt's reported sensation of pharyngeal stasis with solid food ( 3x in past 10 days) may be a referred sensation of esophageal dysmotility/disorder.  Further GI evaluation may be warranted if this persists.  Recommendations: mechanical soft diet with thin liquids, allow for swallow between intakes, maintain upright posture during/after eating for 30 minutes, oral hygiene, position upright (90 degrees), small sips/bites, GI consult if dysphagia symptoms persist ( r/o esophageal dysphagia).         #CKD (chronic kidney disease).    - Incidental RIGHT hydronephrosis on CT.     - Creatinine 1.5 (baseline Cr 1.3)    - Renal US _____         # Heart failure.    - EF unknown    - Euvolemic on exam        # Hyperlipidemia    - Resume home rosuvastatin 10 mg QD         New medications:     Azithromycin 250 mg PO daily x 5 days (4/24 - 4/28) 85 Y M former smoker with PMHx PVD, HLD, BPH, CKD (baseline Cr 1.3), CHF (EF unknown, pt denies h/o CHF and reports normal ECHO) BIBA from [facility] (permanent resident since 7/2019) w/ FB sensation in throat.  He reported feeling like a piece of chicken got stuck in his throat while eating lunch 2-3 hours prior to arrival. He reports suction was used on him and a little chicken came up and he gagged, but denies vomiting. EMS noted on arrival pt vomited and had O2 sat 90%.  On arrival to the ED, he reported feeling much better.  He also reported diffuse bodyaches w/o other sx 1 week ago, thought he had the flu, states he was never tested for flu or COVID.  CT neck showed approximately 2 to 3 mm well-corticated bony density within the right lingual tonsil which may represent potential foreign body versus lingual tonsillar calcification.  He was seen by ENT - no scope indicated as he was currently asymptomatic and at baseline.  CT chest - no radiopaque foreign body identified, groundglass opacities in the right upper, middle and to a lesser extent the left lower lobes, could represent atypical pneumonia/viral infection from atypical agents including COVID-19, partially imaged mild to moderate hydronephrosis of the right kidney.  He was admitted to [facility] for r/o COVID.  COVID PCR negative x 3, RVP negative.  Started on ceftriaxone and azithromycin.  02 sat currently 97% on room air without any signs of respiratory distress, stable for discharged to [facility].         # Acute hypoxemic respiratory failure    - On arrival per EMS satting 90% on RA    - 02 sat currently 97% on RA without any signs of respiratory distress    - CT chest: Groundglass opacities in the right upper, middle and to a lesser extent the left lower lobes, which would be atypical distribution for aspiration pneumonia. This pattern could represent atypical pneumonia/viral infection from atypical agents including COVID-19.     - RVP negative    - COVID PCR negative x 3     - S/p Ceftriaxone 1g IV x 1    - Azithromycin 250 mg daily x 5 days (4/24 - 4/28)        # R/O 2019 novel coronavirus disease (COVID-19)    - Covid-19 PCR negative x 3    - 02 sat currently 97% on RA without any signs of respiratory distress        # Foreign body sensation in throat    - CT neck: Approximately 2 to 3 mm well-corticated bony density within the right lingual tonsil which may represent potential foreign body versus lingual tonsillar calcification.    - ENT following and NTD, observation and no scope indicated at this time since pt currently asymptomatic and at baseline.     - Speech and swallow eval - Pt presents with functional oral and pharyngeal stages of swallowing with no overt signs & symptoms of airway protection deficits across consistencies tested.  Unclear if pt's reported sensation of pharyngeal stasis with solid food ( 3x in past 10 days) may be a referred sensation of esophageal dysmotility/disorder.  Further GI evaluation may be warranted if this persists.  Recommendations: mechanical soft diet with thin liquids, allow for swallow between intakes, maintain upright posture during/after eating for 30 minutes, oral hygiene, position upright (90 degrees), small sips/bites, GI consult if dysphagia symptoms persist ( r/o esophageal dysphagia).         #CKD (chronic kidney disease).    - Incidental RIGHT hydronephrosis on CT.     - Creatinine 1.5 (baseline Cr 1.3)    - Renal US _____         # Heart failure.    - EF unknown    - Euvolemic on exam        # Hyperlipidemia    - Resume home rosuvastatin 10 mg QD         New medications:     Azithromycin 250 mg PO daily x 5 days (4/24 - 4/28) 85 Y M former smoker with PMHx PVD, HLD, BPH, CKD (baseline Cr 1.3), CHF (EF unknown, pt denies h/o CHF and reports normal ECHO) BIBA from [facility] (permanent resident since 7/2019) w/ FB sensation in throat.  He reported feeling like a piece of chicken got stuck in his throat while eating lunch 2-3 hours prior to arrival. He reports suction was used on him and a little chicken came up and he gagged, but denies vomiting. EMS noted on arrival pt vomited and had O2 sat 90%.  On arrival to the ED, he reported feeling much better.  He also reported diffuse bodyaches w/o other sx 1 week ago, thought he had the flu, states he was never tested for flu or COVID.  CT neck showed approximately 2 to 3 mm well-corticated bony density within the right lingual tonsil which may represent potential foreign body versus lingual tonsillar calcification.  He was seen by ENT - no scope indicated as he was currently asymptomatic and at baseline.  CT chest - no radiopaque foreign body identified, groundglass opacities in the right upper, middle and to a lesser extent the left lower lobes, could represent atypical pneumonia/viral infection from atypical agents including COVID-19, partially imaged mild to moderate hydronephrosis of the right kidney.  He was admitted to [facility] for r/o COVID.  COVID PCR negative x 3, RVP negative.  Started on ceftriaxone and azithromycin.  02 sat currently 97% on room air without any signs of respiratory distress, stable for discharged to [facility].         # Acute hypoxemic respiratory failure    - On arrival per EMS satting 90% on RA    - 02 sat currently 97% on RA without any signs of respiratory distress    - CT chest: Groundglass opacities in the right upper, middle and to a lesser extent the left lower lobes, which would be atypical distribution for aspiration pneumonia. This pattern could represent atypical pneumonia/viral infection from atypical agents including COVID-19.     - RVP negative    - COVID PCR negative x 3     - S/p Ceftriaxone 1g IV x 1    - Azithromycin 250 mg daily x 5 days (4/24 - 4/28)        # R/O 2019 novel coronavirus disease (COVID-19)    - Covid-19 PCR negative x 3    - 02 sat currently 97% on RA without any signs of respiratory distress        # Foreign body sensation in throat    - CT neck: Approximately 2 to 3 mm well-corticated bony density within the right lingual tonsil which may represent potential foreign body versus lingual tonsillar calcification.    - ENT following and NTD, observation and no scope indicated at this time since pt currently asymptomatic and at baseline.     - Speech and swallow eval - Pt presents with functional oral and pharyngeal stages of swallowing with no overt signs & symptoms of airway protection deficits across consistencies tested.  Unclear if pt's reported sensation of pharyngeal stasis with solid food ( 3x in past 10 days) may be a referred sensation of esophageal dysmotility/disorder.  Further GI evaluation may be warranted if this persists.  Recommendations: mechanical soft diet with thin liquids, allow for swallow between intakes, maintain upright posture during/after eating for 30 minutes, oral hygiene, position upright (90 degrees), small sips/bites, GI consult if dysphagia symptoms persist ( r/o esophageal dysphagia).         #CKD (chronic kidney disease).    - Incidental RIGHT hydronephrosis on CT.     - Creatinine 1.5 (baseline Cr 1.3)    - Renal US _____         # Heart failure.    - EF unknown    - Euvolemic on exam        # Hyperlipidemia    - Resume home rosuvastatin 10 mg QD         New medications:     Azithromycin 250 mg PO daily x 5 days (4/24 - 4/28)        Labs to be followed on discharge:    BMP    BUN/Cr 85 Y M former smoker with PMHx PVD, HLD, BPH, CKD (baseline Cr 1.3), CHF (EF unknown, pt denies h/o CHF and reports normal ECHO) BIBA from [facility] (permanent resident since 7/2019) w/ FB sensation in throat.  He reported feeling like a piece of chicken got stuck in his throat while eating lunch 2-3 hours prior to arrival. He reports suction was used on him and a little chicken came up and he gagged, but denies vomiting. EMS noted on arrival pt vomited and had O2 sat 90%.  On arrival to the ED, he reported feeling much better.  He also reported diffuse bodyaches w/o other sx 1 week ago, thought he had the flu, states he was never tested for flu or COVID.  CT neck showed approximately 2 to 3 mm well-corticated bony density within the right lingual tonsil which may represent potential foreign body versus lingual tonsillar calcification.  He was seen by ENT - no scope indicated as he was currently asymptomatic and at baseline.  CT chest - no radiopaque foreign body identified, groundglass opacities in the right upper, middle and to a lesser extent the left lower lobes, could represent atypical pneumonia/viral infection from atypical agents including COVID-19, partially imaged mild to moderate hydronephrosis of the right kidney.  He was admitted to [facility] for r/o COVID.  COVID PCR negative x 3, RVP negative.  Started on ceftriaxone and azithromycin.  Speech and swallow eval - functional oral and pharyngeal stages of swallowing with no overt signs & symptoms of airway protection deficits across consistencies tested.  Recommended mechanical soft diet with thin liquids, GI consult if dysphagia symptoms persist ( r/o esophageal dysphagia).  02 sat currently 97% on room air without any signs of respiratory distress, stable for discharged to [facility].         # Acute hypoxemic respiratory failure    - On arrival per EMS satting 90% on RA    - 02 sat currently 97% on RA without any signs of respiratory distress    - CT chest: Groundglass opacities in the right upper, middle and to a lesser extent the left lower lobes, which would be atypical distribution for aspiration pneumonia. This pattern could represent atypical pneumonia/viral infection from atypical agents including COVID-19.     - RVP negative    - COVID PCR negative x 3     - S/p Ceftriaxone 1g IV x 1    - Azithromycin 250 mg daily x 5 days (4/24 - 4/28)        # R/O 2019 novel coronavirus disease (COVID-19)    - Covid-19 PCR negative x 3    - 02 sat currently 97% on RA without any signs of respiratory distress        # Foreign body sensation in throat    - CT neck: Approximately 2 to 3 mm well-corticated bony density within the right lingual tonsil which may represent potential foreign body versus lingual tonsillar calcification.    - ENT following and NTD, observation and no scope indicated at this time since pt currently asymptomatic and at baseline.     - Speech and swallow eval - Pt presents with functional oral and pharyngeal stages of swallowing with no overt signs & symptoms of airway protection deficits across consistencies tested.  Unclear if pt's reported sensation of pharyngeal stasis with solid food ( 3x in past 10 days) may be a referred sensation of esophageal dysmotility/disorder.  Further GI evaluation may be warranted if this persists.  Recommendations: mechanical soft diet with thin liquids, allow for swallow between intakes, maintain upright posture during/after eating for 30 minutes, oral hygiene, position upright (90 degrees), small sips/bites, GI consult if dysphagia symptoms persist ( r/o esophageal dysphagia).         #CKD (chronic kidney disease).    - Incidental RIGHT hydronephrosis on CT.     - Creatinine 1.5 (baseline Cr 1.3)    - Renal US _____         # Heart failure.    - EF unknown    - Euvolemic on exam        # Hyperlipidemia    - Resume home rosuvastatin 10 mg QD         New medications:     Azithromycin 250 mg PO daily x 5 days (4/24 - 4/28)        Labs to be followed on discharge:    BMP    BUN/Cr 85 Y M former smoker with PMHx PVD, HLD, BPH, CKD (baseline Cr 1.3), CHF (EF unknown, pt denies h/o CHF and reports normal ECHO) BIBA from [facility] (permanent resident since 7/2019) w/ FB sensation in throat.  He reported feeling like a piece of chicken got stuck in his throat while eating lunch 2-3 hours prior to arrival. He reports suction was used on him and a little chicken came up and he gagged, but denies vomiting. EMS noted on arrival pt vomited and had O2 sat 90%.  On arrival to the ED, he reported feeling much better.  He also reported diffuse bodyaches w/o other sx 1 week ago, thought he had the flu, states he was never tested for flu or COVID.  CT neck showed approximately 2 to 3 mm well-corticated bony density within the right lingual tonsil which may represent potential foreign body versus lingual tonsillar calcification.  He was seen by ENT - no scope indicated as he was currently asymptomatic and at baseline.  CT chest - no radiopaque foreign body identified, groundglass opacities in the right upper, middle and to a lesser extent the left lower lobes, could represent atypical pneumonia/viral infection from atypical agents including COVID-19, partially imaged mild to moderate hydronephrosis of the right kidney.  He was admitted to [facility] for r/o COVID.  COVID PCR negative x 3, RVP negative.  Started on ceftriaxone and azithromycin.  Speech and swallow eval - functional oral and pharyngeal stages of swallowing with no overt signs & symptoms of airway protection deficits across consistencies tested.  Recommended mechanical soft diet with thin liquids, GI consult if dysphagia symptoms persist ( r/o esophageal dysphagia).  02 sat currently 97% on room air without any signs of respiratory distress, stable for discharged to [facility].         # Acute hypoxemic respiratory failure    - On arrival per EMS satting 90% on RA    - 02 sat currently 97% on RA without any signs of respiratory distress    - CT chest: Groundglass opacities in the right upper, middle and to a lesser extent the left lower lobes, which would be atypical distribution for aspiration pneumonia. This pattern could represent atypical pneumonia/viral infection from atypical agents including COVID-19.     - RVP negative    - COVID PCR negative x 3     - S/p Ceftriaxone 1g IV x 1    - Azithromycin 250 mg daily x 5 days (4/24 - 4/28)        # R/O 2019 novel coronavirus disease (COVID-19)    - Covid-19 PCR negative x 3    - 02 sat currently 97% on RA without any signs of respiratory distress        # Foreign body sensation in throat    - CT neck: Approximately 2 to 3 mm well-corticated bony density within the right lingual tonsil which may represent potential foreign body versus lingual tonsillar calcification.    - ENT following and NTD, observation and no scope indicated at this time since pt currently asymptomatic and at baseline.     - Speech and swallow eval - Pt presents with functional oral and pharyngeal stages of swallowing with no overt signs & symptoms of airway protection deficits across consistencies tested.  Unclear if pt's reported sensation of pharyngeal stasis with solid food ( 3x in past 10 days) may be a referred sensation of esophageal dysmotility/disorder.  Further GI evaluation may be warranted if this persists.  Recommendations: mechanical soft diet with thin liquids, allow for swallow between intakes, maintain upright posture during/after eating for 30 minutes, oral hygiene, position upright (90 degrees), small sips/bites, GI consult if dysphagia symptoms persist ( r/o esophageal dysphagia).         #CKD (chronic kidney disease).    - Incidental RIGHT hydronephrosis on CT.     - Creatinine 1.5 (baseline Cr 1.3)    - Renal US _____         # Heart failure.    - EF unknown    - Euvolemic on exam        # Hyperlipidemia    - Resume home rosuvastatin 10 mg QD         New medications:     Azithromycin 250 mg PO daily x 5 days (4/24 - 4/28)        Labs to be followed on discharge:    BMP    BUN/Cr        Attending Addendum: Pt seen and examined. Feeling well. COVID negative x3. Passed S/S. Likely due to aspiration. SpO2 >95% on RA. Stable to discharge to [facility]. 85 Y M former smoker with PMHx PVD, HLD, BPH, CKD (baseline Cr 1.3), CHF (EF unknown, pt denies h/o CHF and reports normal ECHO) BIBA from [facility] (permanent resident since 7/2019) w/ FB sensation in throat.  He reported feeling like a piece of chicken got stuck in his throat while eating lunch 2-3 hours prior to arrival. He reports suction was used on him and a little chicken came up and he gagged, but denies vomiting. EMS noted on arrival pt vomited and had O2 sat 90%.  On arrival to the ED, he reported feeling much better.  He also reported diffuse bodyaches w/o other sx 1 week ago, thought he had the flu, states he was never tested for flu or COVID.  CT neck showed approximately 2 to 3 mm well-corticated bony density within the right lingual tonsil which may represent potential foreign body versus lingual tonsillar calcification.  He was seen by ENT - no scope indicated as he was currently asymptomatic and at baseline.  CT chest - no radiopaque foreign body identified, groundglass opacities in the right upper, middle and to a lesser extent the left lower lobes, could represent atypical pneumonia/viral infection from atypical agents including COVID-19, partially imaged mild to moderate hydronephrosis of the right kidney.  He was admitted to [facility] for r/o COVID.  COVID PCR negative x 3, RVP negative.  Started on ceftriaxone and azithromycin.  Speech and swallow eval - functional oral and pharyngeal stages of swallowing with no overt signs & symptoms of airway protection deficits across consistencies tested.  Recommended mechanical soft diet with thin liquids, GI consult if dysphagia symptoms persist ( r/o esophageal dysphagia).  02 sat currently 97% on room air without any signs of respiratory distress, stable for discharged to [facility].         # Acute hypoxemic respiratory failure    - On arrival per EMS satting 90% on RA    - 02 sat currently 97% on RA without any signs of respiratory distress    - CT chest: Groundglass opacities in the right upper, middle and to a lesser extent the left lower lobes, which would be atypical distribution for aspiration pneumonia. This pattern could represent atypical pneumonia/viral infection from atypical agents including COVID-19.     - RVP negative    - COVID PCR negative x 3     - S/p Ceftriaxone 1g IV x 1    - Azithromycin 250 mg daily x 5 days (4/24 - 4/28)        # R/O 2019 novel coronavirus disease (COVID-19)    - Covid-19 PCR negative x 3    - 02 sat currently 97% on RA without any signs of respiratory distress        # Foreign body sensation in throat    - CT neck: Approximately 2 to 3 mm well-corticated bony density within the right lingual tonsil which may represent potential foreign body versus lingual tonsillar calcification.    - ENT following and NTD, observation and no scope indicated at this time since pt currently asymptomatic and at baseline.     - Speech and swallow eval - Pt presents with functional oral and pharyngeal stages of swallowing with no overt signs & symptoms of airway protection deficits across consistencies tested.  Unclear if pt's reported sensation of pharyngeal stasis with solid food ( 3x in past 10 days) may be a referred sensation of esophageal dysmotility/disorder.  Further GI evaluation may be warranted if this persists.  Recommendations: mechanical soft diet with thin liquids, allow for swallow between intakes, maintain upright posture during/after eating for 30 minutes, oral hygiene, position upright (90 degrees), small sips/bites, GI consult if dysphagia symptoms persist ( r/o esophageal dysphagia).         #CKD (chronic kidney disease).    - Incidental right hydronephrosis on CT.     - Creatinine 1.5 (baseline Cr 1.3)        # Heart failure.    - EF unknown    - Euvolemic on exam        # Hyperlipidemia    - Resume home rosuvastatin 10 mg QD         New medications:     Azithromycin 250 mg PO daily x 5 days (4/24 - 4/28)        Labs to be followed on discharge:    BMP    BUN/Cr        Attending Addendum: Pt seen and examined. Feeling well. COVID negative x3. Passed S/S. Likely due to aspiration. SpO2 >95% on RA. Stable to discharge to [facility]. 85 Y M former smoker with PMHx PVD, HLD, BPH, CKD (baseline Cr 1.3), CHF (EF unknown, pt denies h/o CHF and reports normal ECHO) BIBA from [facility] (permanent resident since 7/2019) w/ FB sensation in throat.  He reported feeling like a piece of chicken got stuck in his throat while eating lunch 2-3 hours prior to arrival. He reports suction was used on him and a little chicken came up and he gagged, but denies vomiting. EMS noted on arrival pt vomited and had O2 sat 90%.  On arrival to the ED, he reported feeling much better.  He also reported diffuse bodyaches w/o other sx 1 week ago, thought he had the flu, states he was never tested for flu or COVID.  CT neck showed approximately 2 to 3 mm well-corticated bony density within the right lingual tonsil which may represent potential foreign body versus lingual tonsillar calcification.  He was seen by ENT - no scope indicated as he was currently asymptomatic and at baseline.  CT chest - no radiopaque foreign body identified, groundglass opacities in the right upper, middle and to a lesser extent the left lower lobes, could represent atypical pneumonia/viral infection from atypical agents including COVID-19, partially imaged mild to moderate hydronephrosis of the right kidney.  He was admitted to [facility] for r/o COVID.  COVID PCR negative x 3, RVP negative.  Started on ceftriaxone and azithromycin.  Speech and swallow eval - functional oral and pharyngeal stages of swallowing with no overt signs & symptoms of airway protection deficits across consistencies tested.  Recommended mechanical soft diet with thin liquids, GI consult if dysphagia symptoms persist ( r/o esophageal dysphagia).  02 sat currently 97% on room air without any signs of respiratory distress, stable for discharged to [facility].         # Acute hypoxemic respiratory failure    - On arrival per EMS satting 90% on RA    - 02 sat currently 97% on RA without any signs of respiratory distress    - CT chest: Groundglass opacities in the right upper, middle and to a lesser extent the left lower lobes, which would be atypical distribution for aspiration pneumonia. This pattern could represent atypical pneumonia/viral infection from atypical agents including COVID-19.     - RVP negative    - COVID PCR negative x 3     - S/p Ceftriaxone 1g IV x 1    - Azithromycin 250 mg daily x 5 days (4/24 - 4/28)        # R/O 2019 novel coronavirus disease (COVID-19)    - Covid-19 PCR negative x 3    - 02 sat currently 97% on RA without any signs of respiratory distress        # Foreign body sensation in throat    - CT neck: Approximately 2 to 3 mm well-corticated bony density within the right lingual tonsil which may represent potential foreign body versus lingual tonsillar calcification.    - ENT following and NTD, observation and no scope indicated at this time since pt currently asymptomatic and at baseline.     - Speech and swallow eval - Pt presents with functional oral and pharyngeal stages of swallowing with no overt signs & symptoms of airway protection deficits across consistencies tested.  Unclear if pt's reported sensation of pharyngeal stasis with solid food ( 3x in past 10 days) may be a referred sensation of esophageal dysmotility/disorder.  Further GI evaluation may be warranted if this persists.  Recommendations: mechanical soft diet with thin liquids, allow for swallow between intakes, maintain upright posture during/after eating for 30 minutes, oral hygiene, position upright (90 degrees), small sips/bites, GI consult if dysphagia symptoms persist ( r/o esophageal dysphagia).         #CKD (chronic kidney disease).    - Incidental right hydronephrosis on CT.     - Creatinine 1.5 (baseline Cr 1.3)    - RP US ____        # Heart failure.    - EF unknown    - Euvolemic on exam        # Hyperlipidemia    - Resume home rosuvastatin 10 mg QD         New medications:     Azithromycin 250 mg PO daily x 5 days (4/24 - 4/28)        Labs to be followed on discharge:    BMP    BUN/Cr        Attending Addendum: Pt seen and examined. Feeling well. COVID negative x3. Passed S/S. Likely due to aspiration. SpO2 >95% on RA. Stable to discharge to [facility]. 85 Y M former smoker with PMHx PVD, HLD, BPH, CKD (baseline Cr 1.3), CHF (EF unknown, pt denies h/o CHF and reports normal ECHO) BIBA from [facility] (permanent resident since 7/2019) w/ FB sensation in throat.  He reported feeling like a piece of chicken got stuck in his throat while eating lunch 2-3 hours prior to arrival. He reports suction was used on him and a little chicken came up and he gagged, but denies vomiting. EMS noted on arrival pt vomited and had O2 sat 90%.  On arrival to the ED, he reported feeling much better.  He also reported diffuse bodyaches w/o other sx 1 week ago, thought he had the flu, states he was never tested for flu or COVID.  CT neck showed approximately 2 to 3 mm well-corticated bony density within the right lingual tonsil which may represent potential foreign body versus lingual tonsillar calcification.  He was seen by ENT - no scope indicated as he was currently asymptomatic and at baseline.  CT chest - no radiopaque foreign body identified, groundglass opacities in the right upper, middle and to a lesser extent the left lower lobes, could represent atypical pneumonia/viral infection from atypical agents including COVID-19, partially imaged mild to moderate hydronephrosis of the right kidney.  He was admitted to [facility] for r/o COVID.  COVID PCR negative x 3, RVP negative.  Started on ceftriaxone and azithromycin.  Speech and swallow eval - functional oral and pharyngeal stages of swallowing with no overt signs & symptoms of airway protection deficits across consistencies tested.  Recommended mechanical soft diet with thin liquids, GI consult if dysphagia symptoms persist ( r/o esophageal dysphagia).  02 sat currently 97% on room air without any signs of respiratory distress, stable for discharged to [facility].         # Acute hypoxemic respiratory failure    - On arrival per EMS satting 90% on RA    - 02 sat currently 97% on RA without any signs of respiratory distress    - CT chest: Groundglass opacities in the right upper, middle and to a lesser extent the left lower lobes, which would be atypical distribution for aspiration pneumonia. This pattern could represent atypical pneumonia/viral infection from atypical agents including COVID-19.     - RVP negative    - COVID PCR negative x 3     - S/p Ceftriaxone 1g IV x 1    - Azithromycin 250 mg daily x 5 days (4/24 - 4/28)        # R/O 2019 novel coronavirus disease (COVID-19)    - Covid-19 PCR negative x 3    - 02 sat currently 97% on RA without any signs of respiratory distress        # Foreign body sensation in throat    - CT neck: Approximately 2 to 3 mm well-corticated bony density within the right lingual tonsil which may represent potential foreign body versus lingual tonsillar calcification.    - ENT following and NTD, observation and no scope indicated at this time since pt currently asymptomatic and at baseline.     - Speech and swallow eval - Pt presents with functional oral and pharyngeal stages of swallowing with no overt signs & symptoms of airway protection deficits across consistencies tested.  Unclear if pt's reported sensation of pharyngeal stasis with solid food ( 3x in past 10 days) may be a referred sensation of esophageal dysmotility/disorder.  Further GI evaluation may be warranted if this persists.  Recommendations: mechanical soft diet with thin liquids, allow for swallow between intakes, maintain upright posture during/after eating for 30 minutes, oral hygiene, position upright (90 degrees), small sips/bites, GI consult if dysphagia symptoms persist ( r/o esophageal dysphagia).         #CKD (chronic kidney disease).    - Incidental right hydronephrosis on CT.     - Creatinine 1.5 (baseline Cr 1.3)        # Heart failure.    - EF unknown    - Euvolemic on exam        # Hyperlipidemia    - Resume home rosuvastatin 10 mg QD         New medications:     Azithromycin 250 mg PO daily x 5 days (4/24 - 4/28)        Labs to be followed on discharge:    BMP    BUN/Cr        Attending Addendum: Pt seen and examined. Feeling well. COVID negative x3. Passed S/S. Likely due to aspiration. SpO2 >95% on RA. Stable to discharge to [facility]. 85 Y M former smoker with PMHx PVD, HLD, BPH, CKD (baseline Cr 1.3), CHF (EF unknown, pt denies h/o CHF and reports normal ECHO) BIBA from [facility] (permanent resident since 7/2019) w/ FB sensation in throat.  He reported feeling like a piece of chicken got stuck in his throat while eating lunch 2-3 hours prior to arrival. He reports suction was used on him and a little chicken came up and he gagged, but denies vomiting. EMS noted on arrival pt vomited and had O2 sat 90%.  On arrival to the ED, he reported feeling much better.  He also reported diffuse bodyaches w/o other sx 1 week ago, thought he had the flu, states he was never tested for flu or COVID.  CT neck showed approximately 2 to 3 mm well-corticated bony density within the right lingual tonsil which may represent potential foreign body versus lingual tonsillar calcification.  He was seen by ENT - no scope indicated as he was currently asymptomatic and at baseline.  CT chest - no radiopaque foreign body identified, groundglass opacities in the right upper, middle and to a lesser extent the left lower lobes, could represent atypical pneumonia/viral infection from atypical agents including COVID-19, partially imaged mild to moderate hydronephrosis of the right kidney.  He was admitted to [facility] for r/o COVID.  COVID PCR negative x 3, RVP negative.  Started on ceftriaxone and azithromycin.  Speech and swallow eval - functional oral and pharyngeal stages of swallowing with no overt signs & symptoms of airway protection deficits across consistencies tested.  Recommended mechanical soft diet with thin liquids, GI consult if dysphagia symptoms persist ( r/o esophageal dysphagia).  02 sat currently 97% on room air without any signs of respiratory distress, stable for discharged to [facility].         # Acute hypoxemic respiratory failure    - On arrival per EMS satting 90% on RA    - 02 sat currently 97% on RA without any signs of respiratory distress    - CT chest: Groundglass opacities in the right upper, middle and to a lesser extent the left lower lobes, which would be atypical distribution for aspiration pneumonia. This pattern could represent atypical pneumonia/viral infection from atypical agents including COVID-19.     - RVP negative    - COVID PCR negative x 3     - S/p Ceftriaxone 1g IV x 1    - Azithromycin 250 mg daily x 5 days (4/24 - 4/28)        # R/O 2019 novel coronavirus disease (COVID-19)    - Covid-19 PCR negative x 3    - 02 sat currently 97% on RA without any signs of respiratory distress        # Foreign body sensation in throat    - CT neck: Approximately 2 to 3 mm well-corticated bony density within the right lingual tonsil which may represent potential foreign body versus lingual tonsillar calcification.    - ENT following and NTD, observation and no scope indicated at this time since pt currently asymptomatic and at baseline.     - Speech and swallow eval - Pt presents with functional oral and pharyngeal stages of swallowing with no overt signs & symptoms of airway protection deficits across consistencies tested.  Unclear if pt's reported sensation of pharyngeal stasis with solid food ( 3x in past 10 days) may be a referred sensation of esophageal dysmotility/disorder.  Further GI evaluation may be warranted if this persists.  Recommendations: mechanical soft diet with thin liquids, allow for swallow between intakes, maintain upright posture during/after eating for 30 minutes, oral hygiene, position upright (90 degrees), small sips/bites, GI consult if dysphagia symptoms persist ( r/o esophageal dysphagia).         #CKD (chronic kidney disease).    - Incidental right hydronephrosis on CT.     - Creatinine 1.5 (baseline Cr 1.3)    - F/u as an outpatient         #Hydronephrosis    - Incidental right hydronephrosis on CT.     - Creatinine 1.5 (baseline Cr 1.3)    - F/u as an outpatient         # Heart failure.    - EF unknown    - Euvolemic on exam        # Hyperlipidemia    - Resume home rosuvastatin 10 mg QD         New medications:     Azithromycin 250 mg PO daily x 5 days (4/24 - 4/28)        Labs to be followed on discharge:    BMP    BUN/Cr        Attending Addendum: Pt seen and examined. Feeling well. COVID negative x3. Passed S/S. Likely due to aspiration. SpO2 >95% on RA. Stable to discharge to [facility].

## 2020-04-24 NOTE — DISCHARGE NOTE PROVIDER - NSDCCPCAREPLAN_GEN_ALL_CORE_FT
PRINCIPAL DISCHARGE DIAGNOSIS  Diagnosis: Foreign body sensation in throat  Assessment and Plan of Treatment:       SECONDARY DISCHARGE DIAGNOSES  Diagnosis: Atypical pneumonia  Assessment and Plan of Treatment:     Diagnosis: Acute hypoxemic respiratory failure  Assessment and Plan of Treatment: Acute hypoxemic respiratory failure      Diagnosis: HLD (hyperlipidemia)  Assessment and Plan of Treatment: HLD (hyperlipidemia)    Diagnosis: CKD (chronic kidney disease)  Assessment and Plan of Treatment: CKD (chronic kidney disease) PRINCIPAL DISCHARGE DIAGNOSIS  Diagnosis: Foreign body sensation in throat  Assessment and Plan of Treatment: You came into the hospital with report of feeling like food got stuck in your throat.  You were seen for a swallow evaluation.  The following was recommended for you: mechanical soft diet with thin liquids, allow for swallow between intakes, maintain upright posture during/after eating for 30 minutes, oral hygiene, position upright (90 degrees), small sips/bites, gastroenterology consult if difficulty swallowing persists.      SECONDARY DISCHARGE DIAGNOSES  Diagnosis: Atypical pneumonia  Assessment and Plan of Treatment: You were found to have signs of pneumonia on a CT scan of your chest.  You were started on a 5-day course of azithromycin.  Continue azithromycing 250 mg one a day until 4/28.    Diagnosis: Acute hypoxemic respiratory failure  Assessment and Plan of Treatment: Acute hypoxemic respiratory failure  You had a mildly low blood oxygen level when you came into the hospital.  You were given supplemental oxygen and this improved.  You were weaned off the oxygen, and your blood oxygen level is normal on room air.  If you develop any difficulty breathing, seek medical attention right away.    Diagnosis: HLD (hyperlipidemia)  Assessment and Plan of Treatment: HLD (hyperlipidemia)  Continue your home rosuvastatin.    Diagnosis: CKD (chronic kidney disease)  Assessment and Plan of Treatment: CKD (chronic kidney disease) PRINCIPAL DISCHARGE DIAGNOSIS  Diagnosis: Foreign body sensation in throat  Assessment and Plan of Treatment: You came into the hospital with report of feeling like food got stuck in your throat.  You were seen for a swallow evaluation.  The following was recommended for you: mechanical soft diet with thin liquids, allow for swallow between intakes, maintain upright posture during/after eating for 30 minutes, oral hygiene, position upright (90 degrees), small sips/bites, gastroenterology consult if difficulty swallowing persists.      SECONDARY DISCHARGE DIAGNOSES  Diagnosis: Atypical pneumonia  Assessment and Plan of Treatment: You were found to have signs of pneumonia on a CT scan of your chest.  You were started on a 5-day course of azithromycin.  Continue azithromycin 250 mg one a day until 4/28.    Diagnosis: HLD (hyperlipidemia)  Assessment and Plan of Treatment: Continue your home rosuvastatin.    Diagnosis: Acute hypoxemic respiratory failure  Assessment and Plan of Treatment: You had a mildly low blood oxygen level when you came into the hospital.  You were given supplemental oxygen and this improved.  You were weaned off the oxygen, and your blood oxygen level is normal on room air.  If you develop any difficulty breathing, seek medical attention right away. PRINCIPAL DISCHARGE DIAGNOSIS  Diagnosis: Foreign body sensation in throat  Assessment and Plan of Treatment: You came into the hospital with report of feeling like food got stuck in your throat.  You were seen for a swallow evaluation.  The following was recommended for you: mechanical soft diet with thin liquids, allow for swallow between intakes, maintain upright posture during/after eating for 30 minutes, oral hygiene, position upright (90 degrees), small sips/bites, gastroenterology consult if difficulty swallowing persists.      SECONDARY DISCHARGE DIAGNOSES  Diagnosis: Hydronephrosis  Assessment and Plan of Treatment: CT chest showed partially imaged mild to moderate hydronephrosis of the right kidney.  Please follow-up with your primary care doctor within 1 week.    Diagnosis: Atypical pneumonia  Assessment and Plan of Treatment: You were found to have signs of pneumonia on a CT scan of your chest.  You were started on a 5-day course of azithromycin.  Continue azithromycin 250 mg one a day until 4/28.    Diagnosis: Acute hypoxemic respiratory failure  Assessment and Plan of Treatment: You had a mildly low blood oxygen level when you came into the hospital.  You were given supplemental oxygen and this improved.  You were weaned off the oxygen, and your blood oxygen level is normal on room air.  If you develop any difficulty breathing, seek medical attention right away.    Diagnosis: HLD (hyperlipidemia)  Assessment and Plan of Treatment: Continue your home rosuvastatin.

## 2020-04-24 NOTE — PATIENT PROFILE ADULT - DISASTER - FUNCTIONAL SCREEN CURRENT LEVEL: SWALLOWING (IF SCORE 2 OR MORE FOR ANY ITEM, CONSULT REHAB SERVICES), MLM)
0 = swallows foods/liquids without difficulty/NPO until scope due to pt. initial complaint of chicken stuck in throat

## 2020-04-24 NOTE — DISCHARGE NOTE NURSING/CASE MANAGEMENT/SOCIAL WORK - PATIENT PORTAL LINK FT
You can access the FollowMyHealth Patient Portal offered by [facility] by registering at the following website: [email or web address]. By joining [employer]’s FollowMyHealth portal, you will also be able to view your health information using other applications (apps) compatible with our system.

## 2020-04-24 NOTE — SWALLOW BEDSIDE ASSESSMENT ADULT - SWALLOW EVAL: DIAGNOSIS
Pt presents with functional oral and pharyngeal stages of swallowing with no overt signs & symptoms of airway protection deficits across consistencies tested.  Unclear if pt's reported sensation of pharyngeal stasis with solid food ( 3x in past 10 days) may be a referred sensation of esophageal dysmotility/disorder.  Further GI evaluation may be warranted if this persists.

## 2020-04-24 NOTE — SWALLOW BEDSIDE ASSESSMENT ADULT - SPECIFY REASON(S)
84 yo male NH resident with PMH of PVD, HLD, BPH, CKD, CHF admitted with globus sensation with vomiting & desats to 90%.  COVID neg x 1, awaiting repeat COVID PCR

## 2020-04-24 NOTE — DISCHARGE NOTE PROVIDER - NSDCMRMEDTOKEN_GEN_ALL_CORE_FT
acetaminophen 325 mg oral tablet, disintegratin tab(s) orally every 6 hours  finasteride 5 mg oral tablet: 1 tab(s) orally once a day  meclizine 25 mg oral tablet: 1 tab(s) orally 3 times a day  Melatonin 10 mg oral capsule: 1 cap(s) orally once a day (at bedtime)  oxybutynin 5 mg oral tablet: 1 tab(s) orally once a day  rosuvastatin 10 mg oral tablet: 1 tab(s) orally once a day  tamsulosin 0.4 mg oral capsule: 1 cap(s) orally once a day acetaminophen 325 mg oral tablet, disintegratin tab(s) orally every 6 hours  azithromycin 250 mg oral tablet: 1 tab(s) orally once a day for 4 days (last dose on ).   finasteride 5 mg oral tablet: 1 tab(s) orally once a day  meclizine 25 mg oral tablet: 1 tab(s) orally 3 times a day  Melatonin 10 mg oral capsule: 1 cap(s) orally once a day (at bedtime)  oxybutynin 5 mg oral tablet: 1 tab(s) orally once a day  rosuvastatin 10 mg oral tablet: 1 tab(s) orally once a day  tamsulosin 0.4 mg oral capsule: 1 cap(s) orally once a day

## 2020-04-25 LAB
G6PD RBC-CCNC: 12.2 U/G HGB — SIGNIFICANT CHANGE UP (ref 7–20.5)
GAMMA INTERFERON BACKGROUND BLD IA-ACNC: 0 IU/ML — SIGNIFICANT CHANGE UP
M TB IFN-G BLD-IMP: NEGATIVE — SIGNIFICANT CHANGE UP
M TB IFN-G CD4+ BCKGRND COR BLD-ACNC: 0 IU/ML — SIGNIFICANT CHANGE UP
M TB IFN-G CD4+CD8+ BCKGRND COR BLD-ACNC: 0 IU/ML — SIGNIFICANT CHANGE UP
QUANT TB PLUS MITOGEN MINUS NIL: 2.9 IU/ML — SIGNIFICANT CHANGE UP
SARS-COV-2 RNA SPEC QL NAA+PROBE: DETECTED

## 2020-04-28 LAB
CULTURE RESULTS: NO GROWTH — SIGNIFICANT CHANGE UP
CULTURE RESULTS: NO GROWTH — SIGNIFICANT CHANGE UP
SPECIMEN SOURCE: SIGNIFICANT CHANGE UP
SPECIMEN SOURCE: SIGNIFICANT CHANGE UP

## 2020-05-01 DIAGNOSIS — Z87.891 PERSONAL HISTORY OF NICOTINE DEPENDENCE: ICD-10-CM

## 2020-05-01 DIAGNOSIS — N40.0 BENIGN PROSTATIC HYPERPLASIA WITHOUT LOWER URINARY TRACT SYMPTOMS: ICD-10-CM

## 2020-05-01 DIAGNOSIS — I50.9 HEART FAILURE, UNSPECIFIED: ICD-10-CM

## 2020-05-01 DIAGNOSIS — R09.89 OTHER SPECIFIED SYMPTOMS AND SIGNS INVOLVING THE CIRCULATORY AND RESPIRATORY SYSTEMS: ICD-10-CM

## 2020-05-01 DIAGNOSIS — N13.30 UNSPECIFIED HYDRONEPHROSIS: ICD-10-CM

## 2020-05-01 DIAGNOSIS — E78.5 HYPERLIPIDEMIA, UNSPECIFIED: ICD-10-CM

## 2020-05-01 DIAGNOSIS — J96.01 ACUTE RESPIRATORY FAILURE WITH HYPOXIA: ICD-10-CM

## 2020-05-01 DIAGNOSIS — I73.9 PERIPHERAL VASCULAR DISEASE, UNSPECIFIED: ICD-10-CM

## 2020-05-01 DIAGNOSIS — J18.9 PNEUMONIA, UNSPECIFIED ORGANISM: ICD-10-CM

## 2020-05-01 DIAGNOSIS — N18.9 CHRONIC KIDNEY DISEASE, UNSPECIFIED: ICD-10-CM

## 2022-01-22 ENCOUNTER — INPATIENT (INPATIENT)
Facility: HOSPITAL | Age: 87
LOS: 13 days | Discharge: NURSING, REHAB OR LONG-TERM CARE | DRG: 871 | End: 2022-02-05
Attending: INTERNAL MEDICINE | Admitting: GENERAL ACUTE CARE HOSPITAL
Payer: MEDICARE

## 2022-01-22 VITALS
HEART RATE: 86 BPM | TEMPERATURE: 98 F | RESPIRATION RATE: 18 BRPM | OXYGEN SATURATION: 94 % | DIASTOLIC BLOOD PRESSURE: 54 MMHG | HEIGHT: 69 IN | SYSTOLIC BLOOD PRESSURE: 98 MMHG

## 2022-01-22 DIAGNOSIS — Z98.890 OTHER SPECIFIED POSTPROCEDURAL STATES: Chronic | ICD-10-CM

## 2022-01-22 DIAGNOSIS — I50.9 HEART FAILURE, UNSPECIFIED: ICD-10-CM

## 2022-01-22 DIAGNOSIS — E78.5 HYPERLIPIDEMIA, UNSPECIFIED: ICD-10-CM

## 2022-01-22 DIAGNOSIS — N40.0 BENIGN PROSTATIC HYPERPLASIA WITHOUT LOWER URINARY TRACT SYMPTOMS: ICD-10-CM

## 2022-01-22 DIAGNOSIS — J18.9 PNEUMONIA, UNSPECIFIED ORGANISM: ICD-10-CM

## 2022-01-22 DIAGNOSIS — N17.9 ACUTE KIDNEY FAILURE, UNSPECIFIED: ICD-10-CM

## 2022-01-22 DIAGNOSIS — U07.1 COVID-19: ICD-10-CM

## 2022-01-22 DIAGNOSIS — Z29.9 ENCOUNTER FOR PROPHYLACTIC MEASURES, UNSPECIFIED: ICD-10-CM

## 2022-01-22 PROBLEM — N18.9 CHRONIC KIDNEY DISEASE, UNSPECIFIED: Chronic | Status: ACTIVE | Noted: 2020-04-23

## 2022-01-22 PROBLEM — I73.9 PERIPHERAL VASCULAR DISEASE, UNSPECIFIED: Chronic | Status: ACTIVE | Noted: 2020-04-23

## 2022-01-22 LAB
ALBUMIN SERPL ELPH-MCNC: 2.9 G/DL — LOW (ref 3.3–5)
ALP SERPL-CCNC: 84 U/L — SIGNIFICANT CHANGE UP (ref 40–120)
ALT FLD-CCNC: 15 U/L — SIGNIFICANT CHANGE UP (ref 10–45)
ANION GAP SERPL CALC-SCNC: 13 MMOL/L — SIGNIFICANT CHANGE UP (ref 5–17)
ANISOCYTOSIS BLD QL: SLIGHT — SIGNIFICANT CHANGE UP
APPEARANCE UR: ABNORMAL
APTT BLD: 28.2 SEC — SIGNIFICANT CHANGE UP (ref 27.5–35.5)
AST SERPL-CCNC: 20 U/L — SIGNIFICANT CHANGE UP (ref 10–40)
BACTERIA # UR AUTO: SIGNIFICANT CHANGE UP /HPF
BASOPHILS # BLD AUTO: 0 K/UL — SIGNIFICANT CHANGE UP (ref 0–0.2)
BASOPHILS NFR BLD AUTO: 0 % — SIGNIFICANT CHANGE UP (ref 0–2)
BILIRUB SERPL-MCNC: 0.8 MG/DL — SIGNIFICANT CHANGE UP (ref 0.2–1.2)
BILIRUB UR-MCNC: NEGATIVE — SIGNIFICANT CHANGE UP
BUN SERPL-MCNC: 26 MG/DL — HIGH (ref 7–23)
CALCIUM SERPL-MCNC: 8.5 MG/DL — SIGNIFICANT CHANGE UP (ref 8.4–10.5)
CHLORIDE SERPL-SCNC: 103 MMOL/L — SIGNIFICANT CHANGE UP (ref 96–108)
CK MB CFR SERPL CALC: 1.7 NG/ML — SIGNIFICANT CHANGE UP (ref 0–6.7)
CK SERPL-CCNC: 71 U/L — SIGNIFICANT CHANGE UP (ref 30–200)
CO2 SERPL-SCNC: 22 MMOL/L — SIGNIFICANT CHANGE UP (ref 22–31)
COLOR SPEC: YELLOW — SIGNIFICANT CHANGE UP
CREAT SERPL-MCNC: 1.59 MG/DL — HIGH (ref 0.5–1.3)
DIFF PNL FLD: ABNORMAL
ELLIPTOCYTES BLD QL SMEAR: SLIGHT — SIGNIFICANT CHANGE UP
EOSINOPHIL # BLD AUTO: 0 K/UL — SIGNIFICANT CHANGE UP (ref 0–0.5)
EOSINOPHIL NFR BLD AUTO: 0 % — SIGNIFICANT CHANGE UP (ref 0–6)
GLUCOSE SERPL-MCNC: 122 MG/DL — HIGH (ref 70–99)
GLUCOSE UR QL: NEGATIVE — SIGNIFICANT CHANGE UP
HCT VFR BLD CALC: 36.8 % — LOW (ref 39–50)
HGB BLD-MCNC: 11.7 G/DL — LOW (ref 13–17)
HYALINE CASTS # UR AUTO: SIGNIFICANT CHANGE UP /LPF (ref 0–2)
INR BLD: 1.27 — HIGH (ref 0.88–1.16)
KETONES UR-MCNC: NEGATIVE — SIGNIFICANT CHANGE UP
LACTATE SERPL-SCNC: 1 MMOL/L — SIGNIFICANT CHANGE UP (ref 0.5–2)
LEUKOCYTE ESTERASE UR-ACNC: NEGATIVE — SIGNIFICANT CHANGE UP
LYMPHOCYTES # BLD AUTO: 1.08 K/UL — SIGNIFICANT CHANGE UP (ref 1–3.3)
LYMPHOCYTES # BLD AUTO: 4.4 % — LOW (ref 13–44)
MANUAL SMEAR VERIFICATION: SIGNIFICANT CHANGE UP
MCHC RBC-ENTMCNC: 31 PG — SIGNIFICANT CHANGE UP (ref 27–34)
MCHC RBC-ENTMCNC: 31.8 GM/DL — LOW (ref 32–36)
MCV RBC AUTO: 97.6 FL — SIGNIFICANT CHANGE UP (ref 80–100)
MONOCYTES # BLD AUTO: 1.3 K/UL — HIGH (ref 0–0.9)
MONOCYTES NFR BLD AUTO: 5.3 % — SIGNIFICANT CHANGE UP (ref 2–14)
NEUTROPHILS # BLD AUTO: 22.15 K/UL — HIGH (ref 1.8–7.4)
NEUTROPHILS NFR BLD AUTO: 87.6 % — HIGH (ref 43–77)
NEUTS BAND # BLD: 2.7 % — SIGNIFICANT CHANGE UP (ref 0–8)
NITRITE UR-MCNC: NEGATIVE — SIGNIFICANT CHANGE UP
NT-PROBNP SERPL-SCNC: 2006 PG/ML — HIGH (ref 0–300)
OVALOCYTES BLD QL SMEAR: SLIGHT — SIGNIFICANT CHANGE UP
PH UR: 6 — SIGNIFICANT CHANGE UP (ref 5–8)
PLAT MORPH BLD: ABNORMAL
PLATELET # BLD AUTO: 166 K/UL — SIGNIFICANT CHANGE UP (ref 150–400)
POIKILOCYTOSIS BLD QL AUTO: SLIGHT — SIGNIFICANT CHANGE UP
POLYCHROMASIA BLD QL SMEAR: SLIGHT — SIGNIFICANT CHANGE UP
POTASSIUM SERPL-MCNC: 4 MMOL/L — SIGNIFICANT CHANGE UP (ref 3.5–5.3)
POTASSIUM SERPL-SCNC: 4 MMOL/L — SIGNIFICANT CHANGE UP (ref 3.5–5.3)
PROCALCITONIN SERPL-MCNC: 0.46 NG/ML — HIGH (ref 0.02–0.1)
PROT SERPL-MCNC: 6.4 G/DL — SIGNIFICANT CHANGE UP (ref 6–8.3)
PROT UR-MCNC: 30 MG/DL
PROTHROM AB SERPL-ACNC: 15.1 SEC — HIGH (ref 10.6–13.6)
RBC # BLD: 3.77 M/UL — LOW (ref 4.2–5.8)
RBC # FLD: 14.5 % — SIGNIFICANT CHANGE UP (ref 10.3–14.5)
RBC BLD AUTO: ABNORMAL
RBC CASTS # UR COMP ASSIST: ABNORMAL /HPF
SARS-COV-2 RNA SPEC QL NAA+PROBE: DETECTED
SMUDGE CELLS # BLD: PRESENT — SIGNIFICANT CHANGE UP
SODIUM SERPL-SCNC: 138 MMOL/L — SIGNIFICANT CHANGE UP (ref 135–145)
SP GR SPEC: 1.02 — SIGNIFICANT CHANGE UP (ref 1–1.03)
TROPONIN T SERPL-MCNC: 0.01 NG/ML — SIGNIFICANT CHANGE UP (ref 0–0.01)
UROBILINOGEN FLD QL: 0.2 E.U./DL — SIGNIFICANT CHANGE UP
WBC # BLD: 24.53 K/UL — HIGH (ref 3.8–10.5)
WBC # FLD AUTO: 24.53 K/UL — HIGH (ref 3.8–10.5)
WBC UR QL: < 5 /HPF — SIGNIFICANT CHANGE UP

## 2022-01-22 PROCEDURE — 93010 ELECTROCARDIOGRAM REPORT: CPT

## 2022-01-22 PROCEDURE — 99285 EMERGENCY DEPT VISIT HI MDM: CPT | Mod: CS,25

## 2022-01-22 PROCEDURE — 71045 X-RAY EXAM CHEST 1 VIEW: CPT | Mod: 26

## 2022-01-22 PROCEDURE — 99223 1ST HOSP IP/OBS HIGH 75: CPT

## 2022-01-22 RX ORDER — PIPERACILLIN AND TAZOBACTAM 4; .5 G/20ML; G/20ML
3.38 INJECTION, POWDER, LYOPHILIZED, FOR SOLUTION INTRAVENOUS ONCE
Refills: 0 | Status: COMPLETED | OUTPATIENT
Start: 2022-01-22 | End: 2022-01-22

## 2022-01-22 RX ORDER — ATORVASTATIN CALCIUM 80 MG/1
40 TABLET, FILM COATED ORAL AT BEDTIME
Refills: 0 | Status: DISCONTINUED | OUTPATIENT
Start: 2022-01-22 | End: 2022-02-05

## 2022-01-22 RX ORDER — VANCOMYCIN HCL 1 G
1000 VIAL (EA) INTRAVENOUS ONCE
Refills: 0 | Status: COMPLETED | OUTPATIENT
Start: 2022-01-22 | End: 2022-01-22

## 2022-01-22 RX ORDER — ENOXAPARIN SODIUM 100 MG/ML
40 INJECTION SUBCUTANEOUS EVERY 24 HOURS
Refills: 0 | Status: DISCONTINUED | OUTPATIENT
Start: 2022-01-22 | End: 2022-01-24

## 2022-01-22 RX ORDER — SODIUM CHLORIDE 9 MG/ML
1000 INJECTION INTRAMUSCULAR; INTRAVENOUS; SUBCUTANEOUS
Refills: 0 | Status: DISCONTINUED | OUTPATIENT
Start: 2022-01-22 | End: 2022-01-24

## 2022-01-22 RX ADMIN — ATORVASTATIN CALCIUM 40 MILLIGRAM(S): 80 TABLET, FILM COATED ORAL at 23:26

## 2022-01-22 RX ADMIN — PIPERACILLIN AND TAZOBACTAM 200 GRAM(S): 4; .5 INJECTION, POWDER, LYOPHILIZED, FOR SOLUTION INTRAVENOUS at 19:12

## 2022-01-22 RX ADMIN — ENOXAPARIN SODIUM 40 MILLIGRAM(S): 100 INJECTION SUBCUTANEOUS at 23:26

## 2022-01-22 RX ADMIN — SODIUM CHLORIDE 80 MILLILITER(S): 9 INJECTION INTRAMUSCULAR; INTRAVENOUS; SUBCUTANEOUS at 23:26

## 2022-01-22 RX ADMIN — PIPERACILLIN AND TAZOBACTAM 3.38 GRAM(S): 4; .5 INJECTION, POWDER, LYOPHILIZED, FOR SOLUTION INTRAVENOUS at 19:51

## 2022-01-22 RX ADMIN — Medication 250 MILLIGRAM(S): at 20:05

## 2022-01-22 NOTE — ED ADULT NURSE NOTE - NSIMPLEMENTINTERV_GEN_ALL_ED
Implemented All Fall with Harm Risk Interventions:  [place] to call system. Call bell, personal items and telephone within reach. Instruct patient to call for assistance. Room bathroom lighting operational. Non-slip footwear when patient is off stretcher. Physically safe environment: no spills, clutter or unnecessary equipment. Stretcher in lowest position, wheels locked, appropriate side rails in place. Provide visual cue, wrist band, yellow gown, etc. Monitor gait and stability. Monitor for mental status changes and reorient to person, place, and time. Review medications for side effects contributing to fall risk. Reinforce activity limits and safety measures with patient and family. Provide visual clues: red socks.

## 2022-01-22 NOTE — H&P ADULT - PROBLEM SELECTOR PLAN 5
history of HLD  - continue home Rosuvastatin 10mg daily (atorvastatin 40mg daily) history of BPH   - hold home tamsulosin 0.4mg daily, oxybutynin 5mg daily, and Finasteride 5mg daily given low BPs for now. Restart accordingly

## 2022-01-22 NOTE — H&P ADULT - PROBLEM SELECTOR PLAN 3
reported charted history of CHF, EF unknown, pt denies h/o CHF and reports normal ECHO. BNP 2006. No JVD, crackles, or LE edema on exam. Cardiac enzymes negative. EKG with no acute ischemic changes. CXR with RLL infiltrate and does not appear to be overload in nature  - does not appear to be in exacerbation at this time on admission with cr 1.59 (baseline cr 1.3) likely in the setting of hypovolemia. Patient with low sBPs 80-90 and reporting decreased PO intake the past few days  - s/p 500cc bolus in route  - encourage PO intake  - continue to monitor

## 2022-01-22 NOTE — H&P ADULT - PROBLEM SELECTOR PLAN 4
history of BPH   - hold home tamsulosin 0.4mg daily, oxybutynin 5mg daily, and Finasteride 5mg daily given low BPs for now. Restart accordingly reported charted history of CHF, EF unknown, pt denies h/o CHF and reports normal ECHO. BNP 2006. No JVD, crackles, or LE edema on exam. Cardiac enzymes negative. EKG with no acute ischemic changes. CXR with RLL infiltrate and does not appear to be overload in nature  - does not appear to be in exacerbation at this time

## 2022-01-22 NOTE — H&P ADULT - PROBLEM SELECTOR PLAN 6
F: s/p 500cc bolus (reported in route)  E: Replete K<4, Mg<2  N: DASH/TLC diet  DVT ppx: Lovenox 40mg subq history of HLD  - continue home Rosuvastatin 10mg daily (atorvastatin 40mg daily)

## 2022-01-22 NOTE — ED ADULT NURSE NOTE - NS ED NOTE  TALK SOMEONE YN
Received call from patient that the wrong supplies were ordered, she is requesting updated order sent to [facility].   Patient wants ref #[id number] on the orders.   No

## 2022-01-22 NOTE — ED PROVIDER NOTE - PHYSICAL EXAMINATION
VITAL SIGNS: I have reviewed nursing notes and confirm.  CONSTITUTIONAL: Weak appearing, in no acute distress. AOx3   SKIN:  warm and dry, no acute rash.   HEAD:  normocephalic, atraumatic.  EYES: EOM intact; conjunctiva and sclera clear.  ENT: No nasal discharge; airway clear.   NECK: Supple; non tender.  CARD: S1, S2 normal; no murmurs, gallops, or rubs. Regular rate and rhythm.   RESP:  Clear to auscultation b/l, no wheezes, rales or rhonchi. Decreased sounds to right side of lungs.   ABD: Normal bowel sounds; soft; non-distended; non-tender; no guarding/ rebound.  EXT: Normal ROM. No clubbing, cyanosis or edema. 2+ pulses to b/l ue/le.  NEURO: Alert, oriented, grossly unremarkable  PSYCH: Cooperative, mood and affect appropriate.

## 2022-01-22 NOTE — ED PROVIDER NOTE - CLINICAL SUMMARY MEDICAL DECISION MAKING FREE TEXT BOX
85 y/o M with PNA. Pt was initially hypotensive and received a 500 ml fluid prior to arrival to ED. Pt was 94/59. AOx3. Will check labs, CXR, EKG. Pt given IV antibiotics, sepsis fluids not given secondary to pt hx of CHF. Pt is possible telemonitoring with ICU, and if given improved bp with general fluids, and if ICU states pt is stable, pt will be admitted. 87 y/o M with PNA. Pt was initially hypotensive and received a 500 ml fluid prior to arrival to ED. Pt was 94/59. AOx3. Will check labs, CXR, EKG. Pt given IV antibiotics, sepsis fluids not given secondary to pt hx of CHF. Pt is possible telemonitoring with ICU, and if given improved bp with general fluids, and if ICU states pt is stable, pt admitted.

## 2022-01-22 NOTE — H&P ADULT - ASSESSMENT
86M PMH CHF, HTN, CKD (baseline Cr 1.3), BPH  presents to the ED from [facility] rehab with a complaint of pain when breathing and generalized weakness for the past 2 days admitted for sepsis due to pneumonia. 86M PMH CHF, HTN, CKD (baseline Cr 1.3), BPH  presents to the ED from [facility] rehab with a complaint of pain when breathing and generalized weakness for the past 2 days admitted for sepsis due to pneumonia and COVID.

## 2022-01-22 NOTE — H&P ADULT - PROBLEM SELECTOR PLAN 2
on admission with cr 1.59 (baseline cr 1.3) likely in the setting of hypovolemia. Patient with low sBPs 80-90 and reporting decreased PO intake the past few days  - s/p 500cc bolus in route  - encourage PO intake  - continue to monitor on admission with BP 87/57, WBC 24.53 with neutrophilic predom 87.6%. Lactate 1.0. CXR RLL infiltrate. UA unremarkable. 94% on room air->97% on 2LNC. Right basilar crackles on exam. COVID +, Procal 0.46  - spo2 94% on room air, not candidate for decadron  - not a candidate for monoclonal antibodies since on supplemental oxygen  - defer remdesivir in the setting of MURRAY on CKD  - wean O2 as tolerate  - plan as above to cover for underlying bacterial pneumonia on admission with BP 87/57, WBC 24.53 with neutrophilic predom 87.6%. Lactate 1.0. CXR RLL infiltrate. UA unremarkable. 94% on room air->97% on 2LNC. Right basilar crackles on exam. COVID +, Procal 0.46  - spo2 94% on room air, not candidate for decadron  - not a candidate for monoclonal antibodies since on supplemental oxygen  - defer remdesivir (93% on room air)  - wean O2 as tolerate  - plan as above to cover for underlying bacterial pneumonia on admission with BP 87/57, WBC 24.53 with neutrophilic predom 87.6%. Lactate 1.0. CXR RLL infiltrate. UA unremarkable. 94% on room air->97% on 2LNC. Right basilar crackles on exam. COVID +, Procal 0.46  - spo2 94% on room air, not candidate for decadron  - not a candidate for monoclonal antibodies since on supplemental oxygen  - defer remdesivir (94% on room air)  - wean O2 as tolerate  - plan as above to cover for underlying bacterial pneumonia

## 2022-01-22 NOTE — ED PROVIDER NOTE - OBJECTIVE STATEMENT
85 y/o M with a PMHX of CHF, HTN, and CKD presents to the ED with a cc cp when breathing and generalized weakness for the past 2 days. Pt brought in to ED from [facility] for evaluation. Pt states the pain is when he takes a deep breath and is located on his right side of chest. Pt has no known fevers, denies nausea, vomiting, cough, cold or congestion. 87 y/o M with a PMHX of CHF, HTN, and CKD presents to the ED with a cc cp when breathing and generalized weakness for the past 2 days. Pt brought in to ED from [facility] for evaluation. Pt states the pain is when he takes a breath and is located on his right side of chest. Pt has no known fevers, denies nausea, vomiting, cough, cold or congestion.

## 2022-01-22 NOTE — ED ADULT NURSE NOTE - OBJECTIVE STATEMENT
pt bib ems from NH for Chest pain x 2 hours pta. pt states that he cant take a deep breath. pt given ASA by EMS. per EMS pt hypotensive. given 200ml of NS. pt placed on monitor, BP = 86/55. pt aaox3, awake and verbally responsive

## 2022-01-22 NOTE — H&P ADULT - PROBLEM SELECTOR PLAN 1
on admission with BP 87/57, WBC 24.53 with neutrophilic predom 87.6%. Lactate 1.0.  CXR RLL infiltrate. UA unremarkable. 94% on room air->97% on 2LNC. Right basilar crackles on exam.  - s/p Vancomycin 1g IV and Zosyn 3.375g IV in the ED  - reported 500cc bolus prior to arrival  - f/u procal  - f/u strep pneumo antigen and urine legionella  - f/u blood cultures on admission with BP 87/57, WBC 24.53 with neutrophilic predom 87.6%. Lactate 1.0. CXR RLL infiltrate. UA unremarkable. 94% on room air->97% on 2LNC. Right basilar crackles on exam. COVID +, Procal 0.46  - s/p Vancomycin 1g IV and Zosyn 3.375g IV in the ED  - reported 500cc bolus prior to arrival  - continue abx - vancomycin and zosyn to cover for bacterial pneumonia given RLL opacity and elevated procal   - f/u strep pneumo antigen and urine legionella  - obtain MRSA swab  - f/u blood cultures

## 2022-01-22 NOTE — H&P ADULT - NSHPPHYSICALEXAM_GEN_ALL_CORE
Vital Signs Last 12 Hrs  T(F): 98.7 (01-22-22 @ 21:19), Max: 98.7 (01-22-22 @ 21:19)  HR: 94 (01-22-22 @ 21:19) (67 - 94)  BP: 93/58 (01-22-22 @ 21:19) (87/57 - 98/54)  BP(mean): --  RR: 18 (01-22-22 @ 21:19) (18 - 18)  SpO2: 97% (01-22-22 @ 21:19) (94% - 97%)    PHYSICAL EXAM:  Constitutional: cachectic, NAD, comfortable in bed.  HEENT: NC/AT, PERRLA, EOMI, no conjunctival pallor or scleral icterus, dry MM  Neck: Supple, no JVD  Respiratory: Right lower lobe basilar crackles. No w/r/r.   Cardiovascular: RRR, normal S1 and S2, no m/r/g.   Gastrointestinal: +BS, soft NTND, no guarding or rebound tenderness, no palpable masses   Extremities: wwp; no cyanosis, clubbing or edema.   Vascular: Pulses equal and strong throughout.   Neurological: AAOx3, no CN deficits, strength and sensation intact throughout.   Skin: No gross skin abnormalities or rashes

## 2022-01-22 NOTE — ED ADULT NURSE NOTE - CHIEF COMPLAINT QUOTE
BIBEMS from [facility] for chest pain x 2 hours, feels like he cannot take a deep breath. given 324 ASA PTA. 18 G L hand placed by EMS and 500cc LR given en route.

## 2022-01-22 NOTE — H&P ADULT - PROBLEM SELECTOR PLAN 7
F: s/p 500cc bolus (reported in route)  E: Replete K<4, Mg<2  N: DASH/TLC diet  DVT ppx: Lovenox 40mg subq

## 2022-01-22 NOTE — H&P ADULT - HISTORY OF PRESENT ILLNESS
86M PMH CHF, HTN, CKD (baseline Cr 1.3), BPH  [facility] of pain when breathing and generalized weakness for the past 2 days. He states that when he takes a deep breath he feel pain on the right side of his chest but otherwise denies nay shortness of breath or pain at rest. He says he has been eating less the past few days and feeling more weak. Patient has been a resident of [facility] for years and has no fevers, rhinorrhea, cough, abdominal pain, n/v/d/c, edema. Patient has been vaccinated x2 for COVID. He is full code.    ED Course:   Vitals: T 98.6F (rectal), HR 86, BP 98/54, RR 18, O2 94% on RA  Labs: WBC 24.53, Neutrophils 87.6%, hb 11.7, plts 166, Lactate 1.0, Na 136, K 4.0, Cl 103, CO2 22, AG 13, BUN/Cr 26/1.59 (cr 1.3 baseline), glucose 122, trops 0.01, BNP 2006, UA negative.  EKG: NSR HR 73, no acute ischemic changes, qtc 416  Imaging: CXR RLL infiltrate  Interventions: Vancomycin 1g IV and Zosyn 3.375g IV, (reported 500cc bolus before arrival)   86M PMH CHF, HTN, CKD (baseline Cr 1.3), BPH  [facility] a [facility] pain when breathing and generalized weakness for the past 2 days. He states that when he takes a deep breath he feel pain on the right side of his chest but otherwise denies nay shortness of breath or pain at rest. He says he has been eating less the past few days and feeling more weak. Patient has been a resident of [facility] for years and has no fevers, rhinorrhea, cough, abdominal pain, n/v/d/c, edema. Patient has been vaccinated x2 for COVID. He is full code.    ED Course:   Vitals: T 98.6F (rectal), HR 86, BP 98/54, RR 18, O2 94% on RA  Labs: WBC 24.53, Neutrophils 87.6%, hb 11.7, plts 166, Lactate 1.0, Na 136, K 4.0, Cl 103, CO2 22, AG 13, BUN/Cr 26/1.59 (cr 1.3 baseline), glucose 122, trops 0.01, BNP 2006, UA negative.  EKG: NSR HR 73, no acute ischemic changes, qtc 416  Imaging: CXR RLL infiltrate  Interventions: Vancomycin 1g IV and Zosyn 3.375g IV, (reported 500cc bolus before arrival)   86M PMH CHF, HTN, CKD (baseline Cr 1.3), BPH  [facility] a [facility] pain when breathing and generalized weakness for the past 2 days. He states that when he takes a deep breath he feel pain on the right side of his chest but otherwise denies nay shortness of breath or pain at rest. He says he has been eating less the past few days and feeling more weak. Patient has been a resident of [facility] for years and has no fevers, rhinorrhea, cough, abdominal pain, n/v/d/c, edema. Patient has been vaccinated x2 for COVID. He is full code.    ED Course:   Vitals: T 98.6F (rectal), HR 86, BP 98/54, RR 18, O2 94% on RA  Labs: WBC 24.53, Neutrophils 87.6%, hb 11.7, plts 166, Lactate 1.0, Na 136, K 4.0, Cl 103, CO2 22, AG 13, BUN/Cr 26/1.59 (cr 1.3 baseline), glucose 122, trops 0.01, BNP 2006, procal 0.46, UA negative, COVID+  EKG: NSR HR 73, no acute ischemic changes, qtc 416  Imaging: CXR RLL infiltrate  Interventions: Vancomycin 1g IV and Zosyn 3.375g IV, (reported 500cc bolus before arrival)

## 2022-01-23 LAB
ALBUMIN SERPL ELPH-MCNC: 2.8 G/DL — LOW (ref 3.3–5)
ALP SERPL-CCNC: 92 U/L — SIGNIFICANT CHANGE UP (ref 40–120)
ALT FLD-CCNC: 16 U/L — SIGNIFICANT CHANGE UP (ref 10–45)
ANION GAP SERPL CALC-SCNC: 13 MMOL/L — SIGNIFICANT CHANGE UP (ref 5–17)
ANION GAP SERPL CALC-SCNC: 14 MMOL/L — SIGNIFICANT CHANGE UP (ref 5–17)
ANISOCYTOSIS BLD QL: SLIGHT — SIGNIFICANT CHANGE UP
AST SERPL-CCNC: 21 U/L — SIGNIFICANT CHANGE UP (ref 10–40)
BASOPHILS # BLD AUTO: 0 K/UL — SIGNIFICANT CHANGE UP (ref 0–0.2)
BASOPHILS NFR BLD AUTO: 0 % — SIGNIFICANT CHANGE UP (ref 0–2)
BILIRUB SERPL-MCNC: 1.1 MG/DL — SIGNIFICANT CHANGE UP (ref 0.2–1.2)
BUN SERPL-MCNC: 30 MG/DL — HIGH (ref 7–23)
BUN SERPL-MCNC: 32 MG/DL — HIGH (ref 7–23)
BURR CELLS BLD QL SMEAR: PRESENT — SIGNIFICANT CHANGE UP
CALCIUM SERPL-MCNC: 8.6 MG/DL — SIGNIFICANT CHANGE UP (ref 8.4–10.5)
CALCIUM SERPL-MCNC: 8.6 MG/DL — SIGNIFICANT CHANGE UP (ref 8.4–10.5)
CHLORIDE SERPL-SCNC: 100 MMOL/L — SIGNIFICANT CHANGE UP (ref 96–108)
CHLORIDE SERPL-SCNC: 101 MMOL/L — SIGNIFICANT CHANGE UP (ref 96–108)
CO2 SERPL-SCNC: 23 MMOL/L — SIGNIFICANT CHANGE UP (ref 22–31)
CO2 SERPL-SCNC: 24 MMOL/L — SIGNIFICANT CHANGE UP (ref 22–31)
CREAT ?TM UR-MCNC: 155 MG/DL — SIGNIFICANT CHANGE UP
CREAT SERPL-MCNC: 1.62 MG/DL — HIGH (ref 0.5–1.3)
CREAT SERPL-MCNC: 1.75 MG/DL — HIGH (ref 0.5–1.3)
DACRYOCYTES BLD QL SMEAR: SLIGHT — SIGNIFICANT CHANGE UP
EOSINOPHIL # BLD AUTO: 0 K/UL — SIGNIFICANT CHANGE UP (ref 0–0.5)
EOSINOPHIL NFR BLD AUTO: 0 % — SIGNIFICANT CHANGE UP (ref 0–6)
GIANT PLATELETS BLD QL SMEAR: PRESENT — SIGNIFICANT CHANGE UP
GLUCOSE SERPL-MCNC: 124 MG/DL — HIGH (ref 70–99)
GLUCOSE SERPL-MCNC: 177 MG/DL — HIGH (ref 70–99)
HCT VFR BLD CALC: 39 % — SIGNIFICANT CHANGE UP (ref 39–50)
HGB BLD-MCNC: 12.5 G/DL — LOW (ref 13–17)
LEGIONELLA AG UR QL: NEGATIVE — SIGNIFICANT CHANGE UP
LYMPHOCYTES # BLD AUTO: 1.2 K/UL — SIGNIFICANT CHANGE UP (ref 1–3.3)
LYMPHOCYTES # BLD AUTO: 4.3 % — LOW (ref 13–44)
MAGNESIUM SERPL-MCNC: 2 MG/DL — SIGNIFICANT CHANGE UP (ref 1.6–2.6)
MANUAL SMEAR VERIFICATION: SIGNIFICANT CHANGE UP
MCHC RBC-ENTMCNC: 31.8 PG — SIGNIFICANT CHANGE UP (ref 27–34)
MCHC RBC-ENTMCNC: 32.1 GM/DL — SIGNIFICANT CHANGE UP (ref 32–36)
MCV RBC AUTO: 99.2 FL — SIGNIFICANT CHANGE UP (ref 80–100)
MONOCYTES # BLD AUTO: 0.98 K/UL — HIGH (ref 0–0.9)
MONOCYTES NFR BLD AUTO: 3.5 % — SIGNIFICANT CHANGE UP (ref 2–14)
MRSA PCR RESULT.: POSITIVE
NEUTROPHILS # BLD AUTO: 25.78 K/UL — HIGH (ref 1.8–7.4)
NEUTROPHILS NFR BLD AUTO: 92.2 % — HIGH (ref 43–77)
OVALOCYTES BLD QL SMEAR: SLIGHT — SIGNIFICANT CHANGE UP
PHOSPHATE SERPL-MCNC: 4 MG/DL — SIGNIFICANT CHANGE UP (ref 2.5–4.5)
PLAT MORPH BLD: NORMAL — SIGNIFICANT CHANGE UP
PLATELET # BLD AUTO: 194 K/UL — SIGNIFICANT CHANGE UP (ref 150–400)
POIKILOCYTOSIS BLD QL AUTO: SLIGHT — SIGNIFICANT CHANGE UP
POTASSIUM SERPL-MCNC: 3.9 MMOL/L — SIGNIFICANT CHANGE UP (ref 3.5–5.3)
POTASSIUM SERPL-MCNC: 4 MMOL/L — SIGNIFICANT CHANGE UP (ref 3.5–5.3)
POTASSIUM SERPL-SCNC: 3.9 MMOL/L — SIGNIFICANT CHANGE UP (ref 3.5–5.3)
POTASSIUM SERPL-SCNC: 4 MMOL/L — SIGNIFICANT CHANGE UP (ref 3.5–5.3)
PROCALCITONIN SERPL-MCNC: 13.75 NG/ML — HIGH (ref 0.02–0.1)
PROT SERPL-MCNC: 6.5 G/DL — SIGNIFICANT CHANGE UP (ref 6–8.3)
RBC # BLD: 3.93 M/UL — LOW (ref 4.2–5.8)
RBC # FLD: 14.4 % — SIGNIFICANT CHANGE UP (ref 10.3–14.5)
RBC BLD AUTO: ABNORMAL
S AUREUS DNA NOSE QL NAA+PROBE: POSITIVE
S PNEUM AG UR QL: NEGATIVE — SIGNIFICANT CHANGE UP
SCHISTOCYTES BLD QL AUTO: SLIGHT — SIGNIFICANT CHANGE UP
SODIUM SERPL-SCNC: 137 MMOL/L — SIGNIFICANT CHANGE UP (ref 135–145)
SODIUM SERPL-SCNC: 138 MMOL/L — SIGNIFICANT CHANGE UP (ref 135–145)
SODIUM UR-SCNC: 39 MMOL/L — SIGNIFICANT CHANGE UP
SPHEROCYTES BLD QL SMEAR: SIGNIFICANT CHANGE UP
WBC # BLD: 27.96 K/UL — HIGH (ref 3.8–10.5)
WBC # FLD AUTO: 27.96 K/UL — HIGH (ref 3.8–10.5)

## 2022-01-23 PROCEDURE — 51703 INSERT BLADDER CATH COMPLEX: CPT

## 2022-01-23 PROCEDURE — 99233 SBSQ HOSP IP/OBS HIGH 50: CPT | Mod: GC

## 2022-01-23 RX ORDER — ACETAMINOPHEN 500 MG
650 TABLET ORAL ONCE
Refills: 0 | Status: COMPLETED | OUTPATIENT
Start: 2022-01-23 | End: 2022-01-23

## 2022-01-23 RX ORDER — PIPERACILLIN AND TAZOBACTAM 4; .5 G/20ML; G/20ML
3.38 INJECTION, POWDER, LYOPHILIZED, FOR SOLUTION INTRAVENOUS ONCE
Refills: 0 | Status: COMPLETED | OUTPATIENT
Start: 2022-01-23 | End: 2022-01-23

## 2022-01-23 RX ORDER — OLANZAPINE 15 MG/1
5 TABLET, FILM COATED ORAL ONCE
Refills: 0 | Status: COMPLETED | OUTPATIENT
Start: 2022-01-23 | End: 2022-01-23

## 2022-01-23 RX ORDER — ACETAMINOPHEN 500 MG
650 TABLET ORAL EVERY 6 HOURS
Refills: 0 | Status: DISCONTINUED | OUTPATIENT
Start: 2022-01-23 | End: 2022-02-05

## 2022-01-23 RX ORDER — VANCOMYCIN HCL 1 G
1000 VIAL (EA) INTRAVENOUS EVERY 24 HOURS
Refills: 0 | Status: COMPLETED | OUTPATIENT
Start: 2022-01-23 | End: 2022-01-24

## 2022-01-23 RX ORDER — LANOLIN ALCOHOL/MO/W.PET/CERES
10 CREAM (GRAM) TOPICAL AT BEDTIME
Refills: 0 | Status: DISCONTINUED | OUTPATIENT
Start: 2022-01-23 | End: 2022-02-05

## 2022-01-23 RX ORDER — SODIUM CHLORIDE 9 MG/ML
1000 INJECTION INTRAMUSCULAR; INTRAVENOUS; SUBCUTANEOUS
Refills: 0 | Status: DISCONTINUED | OUTPATIENT
Start: 2022-01-23 | End: 2022-01-24

## 2022-01-23 RX ORDER — LIDOCAINE 4 G/100G
1 CREAM TOPICAL DAILY
Refills: 0 | Status: DISCONTINUED | OUTPATIENT
Start: 2022-01-23 | End: 2022-02-05

## 2022-01-23 RX ORDER — FINASTERIDE 5 MG/1
5 TABLET, FILM COATED ORAL DAILY
Refills: 0 | Status: DISCONTINUED | OUTPATIENT
Start: 2022-01-23 | End: 2022-02-05

## 2022-01-23 RX ORDER — PIPERACILLIN AND TAZOBACTAM 4; .5 G/20ML; G/20ML
3.38 INJECTION, POWDER, LYOPHILIZED, FOR SOLUTION INTRAVENOUS EVERY 6 HOURS
Refills: 0 | Status: DISCONTINUED | OUTPATIENT
Start: 2022-01-23 | End: 2022-01-29

## 2022-01-23 RX ORDER — OLANZAPINE 15 MG/1
2.5 TABLET, FILM COATED ORAL ONCE
Refills: 0 | Status: COMPLETED | OUTPATIENT
Start: 2022-01-23 | End: 2022-01-23

## 2022-01-23 RX ORDER — OLANZAPINE 15 MG/1
2.5 TABLET, FILM COATED ORAL ONCE
Refills: 0 | Status: DISCONTINUED | OUTPATIENT
Start: 2022-01-23 | End: 2022-01-23

## 2022-01-23 RX ORDER — MECLIZINE HCL 12.5 MG
1 TABLET ORAL
Qty: 0 | Refills: 0 | DISCHARGE

## 2022-01-23 RX ADMIN — Medication 1 TABLET(S): at 14:33

## 2022-01-23 RX ADMIN — Medication 650 MILLIGRAM(S): at 01:27

## 2022-01-23 RX ADMIN — FINASTERIDE 5 MILLIGRAM(S): 5 TABLET, FILM COATED ORAL at 11:50

## 2022-01-23 RX ADMIN — Medication 650 MILLIGRAM(S): at 00:35

## 2022-01-23 RX ADMIN — Medication 650 MILLIGRAM(S): at 05:35

## 2022-01-23 RX ADMIN — PIPERACILLIN AND TAZOBACTAM 200 GRAM(S): 4; .5 INJECTION, POWDER, LYOPHILIZED, FOR SOLUTION INTRAVENOUS at 17:47

## 2022-01-23 RX ADMIN — Medication 250 MILLIGRAM(S): at 19:05

## 2022-01-23 RX ADMIN — OLANZAPINE 5 MILLIGRAM(S): 15 TABLET, FILM COATED ORAL at 22:07

## 2022-01-23 RX ADMIN — PIPERACILLIN AND TAZOBACTAM 200 GRAM(S): 4; .5 INJECTION, POWDER, LYOPHILIZED, FOR SOLUTION INTRAVENOUS at 11:50

## 2022-01-23 RX ADMIN — PIPERACILLIN AND TAZOBACTAM 200 GRAM(S): 4; .5 INJECTION, POWDER, LYOPHILIZED, FOR SOLUTION INTRAVENOUS at 06:50

## 2022-01-23 RX ADMIN — PIPERACILLIN AND TAZOBACTAM 200 GRAM(S): 4; .5 INJECTION, POWDER, LYOPHILIZED, FOR SOLUTION INTRAVENOUS at 01:08

## 2022-01-23 RX ADMIN — Medication 650 MILLIGRAM(S): at 04:20

## 2022-01-23 RX ADMIN — OLANZAPINE 5 MILLIGRAM(S): 15 TABLET, FILM COATED ORAL at 20:58

## 2022-01-23 NOTE — PROGRESS NOTE ADULT - SUBJECTIVE AND OBJECTIVE BOX
*INCOMPLETE* OVERNIGHT EVENTS: NAOE    SUBJECTIVE / INTERVAL HPI: Patient seen and examined at bedside. Pt denies F/C, SOB/cough. Pt reports continued right pleuritic chest and back pain. Pt states he does not have an appetite which is unusual and feels overall weak. Pt denies any other symptoms.     MEDICATIONS  (STANDING):  atorvastatin 40 milliGRAM(s) Oral at bedtime  enoxaparin Injectable 40 milliGRAM(s) SubCutaneous every 24 hours  piperacillin/tazobactam IVPB.. 3.375 Gram(s) IV Intermittent every 6 hours  sodium chloride 0.9%. 1000 milliLiter(s) (80 mL/Hr) IV Continuous <Continuous>  vancomycin  IVPB 1000 milliGRAM(s) IV Intermittent every 24 hours    MEDICATIONS  (PRN):    Allergies    No Known Allergies    Intolerances        VITAL SIGNS:  Vital Signs Last 24 Hrs  T(C): 36.8 (2022 04:15), Max: 37.1 (2022 21:19)  T(F): 98.2 (2022 04:15), Max: 98.7 (2022 21:19)  HR: 90 (2022 06:34) (67 - 94)  BP: 89/55 (2022 06:34) (87/57 - 99/64)  BP(mean): --  RR: 18 (2022 06:34) (16 - 18)  SpO2: 92% (2022 06:34) (92% - 97%)        PHYSICAL EXAM:  Constitutional: cachectic, NAD, comfortable in bed.  HEENT: NC/AT, PERRLA, EOMI, no conjunctival pallor or scleral icterus, dry MM  Neck: Supple, no JVD  Respiratory: Right lower lobe basilar crackles. No w/r/r, TTP on right side of chest.   Cardiovascular: RRR, normal S1 and S2, no m/r/g.   Gastrointestinal: +BS, soft NTND, no guarding or rebound tenderness, no palpable masses   Extremities: wwp; no cyanosis, clubbing or edema.   Vascular: Pulses equal and strong throughout.   Neurological: AAOx3, no CN deficits, strength and sensation intact throughout.   Skin: No gross skin abnormalities or rashes      LABS:                        12.5   27.96 )-----------( 194      ( 2022 08:05 )             39.0         138  |  101  |  x   ----------------------------<  x   4.0   |  23  |  x     Ca    8.6      2022 08:05  Phos  4.0       Mg     2.0         TPro  6.4  /  Alb  2.9<L>  /  TBili  0.8  /  DBili  x   /  AST  20  /  ALT  15  /  AlkPhos  84      PT/INR - ( 2022 18:25 )   PT: 15.1 sec;   INR: 1.27          PTT - ( 2022 18:25 )  PTT:28.2 sec  Urinalysis Basic - ( 2022 19:37 )    Color: Yellow / Appearance: SL Cloudy / S.025 / pH: x  Gluc: x / Ketone: NEGATIVE  / Bili: Negative / Urobili: 0.2 E.U./dL   Blood: x / Protein: 30 mg/dL / Nitrite: NEGATIVE   Leuk Esterase: NEGATIVE / RBC: Many /HPF / WBC < 5 /HPF   Sq Epi: x / Non Sq Epi: x / Bacteria: Few /HPF      CAPILLARY BLOOD GLUCOSE            Culture - Urine (collected 22 @ 20:59)  Source: Clean Catch Clean Catch (Midstream)  Preliminary Report (22 @ 08:21):    No growth to date        RADIOLOGY & ADDITIONAL TESTS: Reviewed.

## 2022-01-23 NOTE — DIETITIAN INITIAL EVALUATION ADULT. - PROBLEM SELECTOR PLAN 4
reported charted history of CHF, EF unknown, pt denies h/o CHF and reports normal ECHO. BNP 2006. No JVD, crackles, or LE edema on exam. Cardiac enzymes negative. EKG with no acute ischemic changes. CXR with RLL infiltrate and does not appear to be overload in nature  - does not appear to be in exacerbation at this time

## 2022-01-23 NOTE — PATIENT PROFILE ADULT - FALL HARM RISK - HARM RISK INTERVENTIONS

## 2022-01-23 NOTE — PROGRESS NOTE ADULT - PROBLEM SELECTOR PLAN 2
on admission with BP 87/57, WBC 24.53 with neutrophilic predom 87.6%. Lactate 1.0. CXR RLL infiltrate. UA unremarkable. 94% on room air->97% on 2LNC. Right basilar crackles on exam. COVID +, Procal 0.46    - spo2 94% on room air, not candidate for decadron  - not a candidate for monoclonal antibodies since on supplemental oxygen  - defer remdesivir (94% on room air)  - wean O2 as tolerate  - plan as above to cover for underlying bacterial pneumonia on admission with BP 87/57, WBC 24.53 with neutrophilic predom 87.6%. Lactate 1.0. CXR RLL infiltrate. UA unremarkable. 94% on room air->97% on 2LNC. Right basilar crackles on exam. COVID +, Procal 0.46    - No need for treatment with DEX or REM  - monitor if need for supplemental oxygen

## 2022-01-23 NOTE — DIETITIAN INITIAL EVALUATION ADULT. - OTHER INFO
86M PMH CHF, HTN, CKD (baseline Cr 1.3), BPH  presents to the ED from [facility] rehab with a complaint of pain when breathing and generalized weakness for the past 2 days admitted for sepsis due to pneumonia and COVID. Pt seen in room, awake, alert, pleasant. Noted to be A&Ox3 but did seem confused by some questions during assessment. Pt reported that his appetite is "fine" but could not list foods that he has been eating recently. He does drink Ensure sometimes. Confirmed NKFA. Per RN, he ate a few bites of breakfast but nothing substantial. No complaints of N/V/C/D or pain. Skin noted with sacrum stage II pressure injury, no edema. Afebrile. NFPE completed- noted admit BW 77.2kg likely inaccurate as pt w/severe protein-calorie malnutrition. Encouraged PO intake and discussed that RD to order oral nutrition supplementation. Will continue to follow per RD protocol.

## 2022-01-23 NOTE — DIETITIAN INITIAL EVALUATION ADULT. - PROBLEM SELECTOR PLAN 2
on admission with BP 87/57, WBC 24.53 with neutrophilic predom 87.6%. Lactate 1.0. CXR RLL infiltrate. UA unremarkable. 94% on room air->97% on 2LNC. Right basilar crackles on exam. COVID +, Procal 0.46  - spo2 94% on room air, not candidate for decadron  - not a candidate for monoclonal antibodies since on supplemental oxygen  - defer remdesivir (94% on room air)  - wean O2 as tolerate  - plan as above to cover for underlying bacterial pneumonia

## 2022-01-23 NOTE — PROGRESS NOTE ADULT - ATTENDING COMMENTS
86M w HTN, CKD (1.4), BPH, HTN, p/w malaise, decreased intake from [facility] found to have sepsis 2/2 COVID19 pna and RLL infiltrates and MURRAY on CKD    Pt reports R sided chest wall, lateral back pain. Abd negative for tenderness. Rales at b/l bases.     #Sepsis 2/2 COVID 19 and RLL pna -  Leukocytosis at 28 - increased from yesterday likely 2/2 hemoconcentration  Likely underlying bacterial infection as procalc elevated 13  #MURRAY on CKD (baseline 1.3) - d/t decreased intake and hypotension, ddx also includes retention.  #BPH - on flomax, finasteride, oxybutynin at baseline  #Hypotension - pt does not appear symptomatic. Flomax was held    Plan  Continue IV Vanc and zosyn for presumed RLL pna  f/u MRSA swab, Sputum culture, procalc in AM  Need inflammatory markers - CRP, d-dimer  Encourage PO/Fluid intake  F/U Renal fractional excretion studies  Obtain bladder scan and monitor for rentention since BPH meds held. Would have low threshold to restart flomax  BMP in PM, RP US if renal function not improving.    DISPO: Return to [facility] pending improvement in MURRAY

## 2022-01-23 NOTE — PROGRESS NOTE ADULT - PROBLEM SELECTOR PLAN 4
reported charted history of CHF, EF unknown, pt denies h/o CHF and reports normal ECHO. BNP 2006. No JVD, crackles, or LE edema on exam. Cardiac enzymes negative. EKG with no acute ischemic changes. CXR with RLL infiltrate and does not appear to be overload in nature    - does not appear to be in exacerbation at this time reported charted history of CHF, EF unknown, pt denies h/o CHF and reports normal ECHO. BNP 2006. No JVD, crackles, or LE edema on exam. Cardiac enzymes negative. EKG with no acute ischemic changes. CXR with RLL infiltrate and does not appear to be overload in nature    - does not appear to be in exacerbation at this time > euvolemic

## 2022-01-23 NOTE — PROGRESS NOTE ADULT - PROBLEM SELECTOR PLAN 5
history of BPH     - hold home tamsulosin 0.4mg daily, oxybutynin 5mg daily, and Finasteride 5mg daily given low BPs for now. Restart accordingly history of BPH     - hold home tamsulosin 0.4mg daily, oxybutynin 5mg daily given low BPs  - c/w Finasteride 5mg daily

## 2022-01-23 NOTE — CHART NOTE - NSCHARTNOTEFT_GEN_A_CORE
87 yo M with PMHx of BPH.   SBPs in 80-90s since admission and held flomax 0.4 mg qd and oxybutynin 5 mg qd in response to low BPs/hypotension.   Pt noted to have decrease in UO during 1/23/22. Bladder scan in the evening showed 720 cc.   Pt informed that a straight cath is needed to relieve urinary retention. Pt refusing. Pt AOx1 and deemed w/o competency. Explained risks of no straight cath and explained procedure. Pt still refused. Pt started showing signs of agitation, throwing arms around towards staff. Pt given zyprexa 5 mg IM. Attempt to straight cath 15 mins later with b/l UE restraints. Unable to straight cath, resistance felt.   Consulted urology to place a waite.   Pending. 87 yo M with PMHx of BPH.   SBPs in 80-90s since admission and held flomax 0.4 mg qd and oxybutynin 5 mg qd in response to low BPs/hypotension.   Pt noted to have decrease in UO during 1/23/22. Bladder scan in the evening showed 720 cc.   Pt informed that a straight cath is needed to relieve urinary retention. Pt refusing. Pt AOx1 and deemed w/o competency. Explained risks of no straight cath and explained procedure. Pt still refused. Pt started showing signs of agitation, throwing arms around towards staff. Pt given zyprexa 5 mg IM. Attempt to straight cath 15 mins later with b/l UE restraints. Unable to straight cath, resistance felt.   Consulted urology to place a waite.     Update: Pt refusing waite placing by urology. Pt received additional zyprexa IM 5 once with temporary leg restraints and wrist retraints for overnight to avoid pulling out waite. Waite placed and yielded 700cc dark urine.

## 2022-01-23 NOTE — DIETITIAN NUTRITION RISK NOTIFICATION - TREATMENT: THE FOLLOWING DIET HAS BEEN RECOMMENDED
1. Recommend addition of Ensure Enlive TID (1050 kcal, 60g protein, 540mL free H2O) and MVI 2. Monitor lytes and replete prn. 3. Pain and bowel regimens per team

## 2022-01-23 NOTE — DIETITIAN INITIAL EVALUATION ADULT. - ADD RECOMMEND
1. Recommend addition of Ensure Enlive TID (1050 kcal, 60g protein, 540mL free H2O) and MVI 2. Monitor lytes and replete prn. 3. Pain and bowel regimens per team *bren WILSON

## 2022-01-23 NOTE — PROGRESS NOTE ADULT - ASSESSMENT
85 yo M w/ PMH CHF (Unknown EF), HTN, CKD (baseline Cr 1.3), BPH presented w/ right-sided CP and back bharat along with generalized weakness/poor PO intake, found with sepsis i/s/o COVID PNA along with RLL HAP, as well as MURRAY on CKD, most likely prerenal, currently euvolemic.  87 yo M w/ PMH CHF (Unknown EF), HTN, CKD (baseline Cr 1.3), BPH presented w/ right-sided CP and back bharat along with generalized weakness/poor PO intake, found with sepsis i/s/o COVID PNA along with RLL HAP - uptrending WBC, as well as MURRAY - worsening, most likely prerenal, currently euvolemic.

## 2022-01-23 NOTE — DIETITIAN INITIAL EVALUATION ADULT. - OTHER CALCULATIONS
IBW used to estimate needs based on clinical judgment and suspect admit BW incorrect. Needs estimated for age and adjusted for protein-calorie malnutrition, COVID infection. Fluids per team 2/2 CHF/CKD.

## 2022-01-23 NOTE — PROGRESS NOTE ADULT - PROBLEM SELECTOR PLAN 1
on admission with BP 87/57, WBC 24.53 with neutrophilic predom 87.6%. Lactate 1.0. CXR RLL infiltrate. UA unremarkable. 94% on room air->97% on 2LNC. Right basilar crackles on exam. COVID +, Procal 0.46    - s/p Vancomycin 1g IV and Zosyn 3.375g IV in the ED  - reported 500cc bolus prior to arrival  - continue abx - vancomycin and zosyn to cover for bacterial pneumonia given RLL opacity and elevated procal   - f/u strep pneumo antigen and urine legionella  - obtain MRSA swab  - f/u blood cultures on admission with BP 87/57, WBC 24.53 with neutrophilic predom 87.6%. Lactate 1.0. CXR RLL infiltrate. UA unremarkable. 94% on room air->97% on 2LNC. Right basilar crackles on exam. COVID +, Procal 0.46  MRSA positive  uptrending WBCs on 1/23    - c/w Vancomycin 1g IV and Zosyn 3.375g IV > continue vancomycin and zosyn to cover for bacterial pneumonia given RLL opacity and elevated procal   - f/u strep pneumo antigen and urine legionella  - f/u blood cultures

## 2022-01-23 NOTE — DIETITIAN INITIAL EVALUATION ADULT. - PROBLEM SELECTOR PLAN 1
on admission with BP 87/57, WBC 24.53 with neutrophilic predom 87.6%. Lactate 1.0. CXR RLL infiltrate. UA unremarkable. 94% on room air->97% on 2LNC. Right basilar crackles on exam. COVID +, Procal 0.46  - s/p Vancomycin 1g IV and Zosyn 3.375g IV in the ED  - reported 500cc bolus prior to arrival  - continue abx - vancomycin and zosyn to cover for bacterial pneumonia given RLL opacity and elevated procal   - f/u strep pneumo antigen and urine legionella  - obtain MRSA swab  - f/u blood cultures

## 2022-01-23 NOTE — PATIENT PROFILE ADULT - NSPROIMPLANTSMEDDEV_GEN_A_NUR
Fax number [phone] was abandoned after 5 attempts. Called mom, she states she will send a different fax number through [facility]. Placed form in Dr Mendoza's file drawer for sent paperwork until we receive new fax number.  
None

## 2022-01-23 NOTE — DIETITIAN INITIAL EVALUATION ADULT. - PROBLEM SELECTOR PLAN 3
on admission with cr 1.59 (baseline cr 1.3) likely in the setting of hypovolemia. Patient with low sBPs 80-90 and reporting decreased PO intake the past few days  - s/p 500cc bolus in route  - encourage PO intake  - continue to monitor

## 2022-01-23 NOTE — PROGRESS NOTE ADULT - PROBLEM SELECTOR PLAN 7
F: None  E: Replete K<4, Mg<2  N: DASH/TLC diet  DVT ppx: Lovenox 40mg subq  Code: FULL  Dispo: Pending PT

## 2022-01-23 NOTE — PROGRESS NOTE ADULT - PROBLEM SELECTOR PLAN 3
on admission with cr 1.59 (baseline cr 1.3) likely in the setting of hypovolemia. Patient with low sBPs 80-90 and reporting decreased PO intake the past few days    - s/p 500cc bolus in route  - encourage PO intake  - continue to monitor on admission with cr 1.59 (baseline cr 1.3) likely in the setting of hypovolemia. Patient with low sBPs 80-90 and reporting decreased PO intake the past few days    - encourage PO intake  - continue to monitor > worsening Cr on 1/23, given 500cc bolus NS  - Consider urine lytes if no improvement in Cr on 1/24

## 2022-01-24 LAB
ALBUMIN SERPL ELPH-MCNC: 2.6 G/DL — LOW (ref 3.3–5)
ALP SERPL-CCNC: 126 U/L — HIGH (ref 40–120)
ALT FLD-CCNC: 34 U/L — SIGNIFICANT CHANGE UP (ref 10–45)
ANION GAP SERPL CALC-SCNC: 14 MMOL/L — SIGNIFICANT CHANGE UP (ref 5–17)
AST SERPL-CCNC: 62 U/L — HIGH (ref 10–40)
BASOPHILS # BLD AUTO: 0.08 K/UL — SIGNIFICANT CHANGE UP (ref 0–0.2)
BASOPHILS NFR BLD AUTO: 0.3 % — SIGNIFICANT CHANGE UP (ref 0–2)
BILIRUB SERPL-MCNC: 1 MG/DL — SIGNIFICANT CHANGE UP (ref 0.2–1.2)
BUN SERPL-MCNC: 31 MG/DL — HIGH (ref 7–23)
CALCIUM SERPL-MCNC: 9 MG/DL — SIGNIFICANT CHANGE UP (ref 8.4–10.5)
CHLORIDE SERPL-SCNC: 103 MMOL/L — SIGNIFICANT CHANGE UP (ref 96–108)
CO2 SERPL-SCNC: 21 MMOL/L — LOW (ref 22–31)
CREAT SERPL-MCNC: 1.55 MG/DL — HIGH (ref 0.5–1.3)
CRP SERPL-MCNC: 335.9 MG/L — HIGH (ref 0–4)
CULTURE RESULTS: NO GROWTH — SIGNIFICANT CHANGE UP
D DIMER BLD IA.RAPID-MCNC: 1201 NG/ML DDU — HIGH
EOSINOPHIL # BLD AUTO: 0 K/UL — SIGNIFICANT CHANGE UP (ref 0–0.5)
EOSINOPHIL NFR BLD AUTO: 0 % — SIGNIFICANT CHANGE UP (ref 0–6)
FERRITIN SERPL-MCNC: 1556 NG/ML — HIGH (ref 30–400)
GLUCOSE SERPL-MCNC: 109 MG/DL — HIGH (ref 70–99)
HCT VFR BLD CALC: 40.6 % — SIGNIFICANT CHANGE UP (ref 39–50)
HGB BLD-MCNC: 13.1 G/DL — SIGNIFICANT CHANGE UP (ref 13–17)
IMM GRANULOCYTES NFR BLD AUTO: 1.2 % — SIGNIFICANT CHANGE UP (ref 0–1.5)
LEGIONELLA AG UR QL: NEGATIVE — SIGNIFICANT CHANGE UP
LYMPHOCYTES # BLD AUTO: 0.91 K/UL — LOW (ref 1–3.3)
LYMPHOCYTES # BLD AUTO: 3.6 % — LOW (ref 13–44)
MAGNESIUM SERPL-MCNC: 2.1 MG/DL — SIGNIFICANT CHANGE UP (ref 1.6–2.6)
MCHC RBC-ENTMCNC: 32.1 PG — SIGNIFICANT CHANGE UP (ref 27–34)
MCHC RBC-ENTMCNC: 32.3 GM/DL — SIGNIFICANT CHANGE UP (ref 32–36)
MCV RBC AUTO: 99.5 FL — SIGNIFICANT CHANGE UP (ref 80–100)
MONOCYTES # BLD AUTO: 1.69 K/UL — HIGH (ref 0–0.9)
MONOCYTES NFR BLD AUTO: 6.7 % — SIGNIFICANT CHANGE UP (ref 2–14)
NEUTROPHILS # BLD AUTO: 22.07 K/UL — HIGH (ref 1.8–7.4)
NEUTROPHILS NFR BLD AUTO: 88.2 % — HIGH (ref 43–77)
NRBC # BLD: 0 /100 WBCS — SIGNIFICANT CHANGE UP (ref 0–0)
PHOSPHATE SERPL-MCNC: 3.2 MG/DL — SIGNIFICANT CHANGE UP (ref 2.5–4.5)
PLATELET # BLD AUTO: 217 K/UL — SIGNIFICANT CHANGE UP (ref 150–400)
POTASSIUM SERPL-MCNC: 3.9 MMOL/L — SIGNIFICANT CHANGE UP (ref 3.5–5.3)
POTASSIUM SERPL-SCNC: 3.9 MMOL/L — SIGNIFICANT CHANGE UP (ref 3.5–5.3)
PROT SERPL-MCNC: 6.8 G/DL — SIGNIFICANT CHANGE UP (ref 6–8.3)
RBC # BLD: 4.08 M/UL — LOW (ref 4.2–5.8)
RBC # FLD: 14.4 % — SIGNIFICANT CHANGE UP (ref 10.3–14.5)
S PNEUM AG UR QL: NEGATIVE — SIGNIFICANT CHANGE UP
SODIUM SERPL-SCNC: 138 MMOL/L — SIGNIFICANT CHANGE UP (ref 135–145)
SPECIMEN SOURCE: SIGNIFICANT CHANGE UP
WBC # BLD: 25.06 K/UL — HIGH (ref 3.8–10.5)
WBC # FLD AUTO: 25.06 K/UL — HIGH (ref 3.8–10.5)

## 2022-01-24 PROCEDURE — 93010 ELECTROCARDIOGRAM REPORT: CPT

## 2022-01-24 PROCEDURE — 93010 ELECTROCARDIOGRAM REPORT: CPT | Mod: 77

## 2022-01-24 PROCEDURE — 99233 SBSQ HOSP IP/OBS HIGH 50: CPT | Mod: GC

## 2022-01-24 RX ORDER — QUETIAPINE FUMARATE 200 MG/1
25 TABLET, FILM COATED ORAL DAILY
Refills: 0 | Status: DISCONTINUED | OUTPATIENT
Start: 2022-01-24 | End: 2022-01-28

## 2022-01-24 RX ORDER — ENOXAPARIN SODIUM 100 MG/ML
50 INJECTION SUBCUTANEOUS EVERY 24 HOURS
Refills: 0 | Status: DISCONTINUED | OUTPATIENT
Start: 2022-01-24 | End: 2022-01-24

## 2022-01-24 RX ORDER — TAMSULOSIN HYDROCHLORIDE 0.4 MG/1
0.4 CAPSULE ORAL ONCE
Refills: 0 | Status: COMPLETED | OUTPATIENT
Start: 2022-01-24 | End: 2022-01-24

## 2022-01-24 RX ORDER — POLYETHYLENE GLYCOL 3350 17 G/17G
17 POWDER, FOR SOLUTION ORAL DAILY
Refills: 0 | Status: DISCONTINUED | OUTPATIENT
Start: 2022-01-24 | End: 2022-01-26

## 2022-01-24 RX ORDER — ENOXAPARIN SODIUM 100 MG/ML
50 INJECTION SUBCUTANEOUS EVERY 24 HOURS
Refills: 0 | Status: DISCONTINUED | OUTPATIENT
Start: 2022-01-24 | End: 2022-01-26

## 2022-01-24 RX ORDER — OLANZAPINE 15 MG/1
5 TABLET, FILM COATED ORAL ONCE
Refills: 0 | Status: COMPLETED | OUTPATIENT
Start: 2022-01-24 | End: 2022-01-24

## 2022-01-24 RX ORDER — SENNA PLUS 8.6 MG/1
2 TABLET ORAL AT BEDTIME
Refills: 0 | Status: DISCONTINUED | OUTPATIENT
Start: 2022-01-24 | End: 2022-02-05

## 2022-01-24 RX ORDER — TAMSULOSIN HYDROCHLORIDE 0.4 MG/1
0.4 CAPSULE ORAL AT BEDTIME
Refills: 0 | Status: DISCONTINUED | OUTPATIENT
Start: 2022-01-24 | End: 2022-02-05

## 2022-01-24 RX ADMIN — ATORVASTATIN CALCIUM 40 MILLIGRAM(S): 80 TABLET, FILM COATED ORAL at 21:42

## 2022-01-24 RX ADMIN — ENOXAPARIN SODIUM 50 MILLIGRAM(S): 100 INJECTION SUBCUTANEOUS at 12:56

## 2022-01-24 RX ADMIN — PIPERACILLIN AND TAZOBACTAM 200 GRAM(S): 4; .5 INJECTION, POWDER, LYOPHILIZED, FOR SOLUTION INTRAVENOUS at 00:19

## 2022-01-24 RX ADMIN — Medication 10 MILLIGRAM(S): at 21:42

## 2022-01-24 RX ADMIN — LIDOCAINE 1 PATCH: 4 CREAM TOPICAL at 19:25

## 2022-01-24 RX ADMIN — TAMSULOSIN HYDROCHLORIDE 0.4 MILLIGRAM(S): 0.4 CAPSULE ORAL at 15:59

## 2022-01-24 RX ADMIN — PIPERACILLIN AND TAZOBACTAM 200 GRAM(S): 4; .5 INJECTION, POWDER, LYOPHILIZED, FOR SOLUTION INTRAVENOUS at 12:55

## 2022-01-24 RX ADMIN — TAMSULOSIN HYDROCHLORIDE 0.4 MILLIGRAM(S): 0.4 CAPSULE ORAL at 21:42

## 2022-01-24 RX ADMIN — POLYETHYLENE GLYCOL 3350 17 GRAM(S): 17 POWDER, FOR SOLUTION ORAL at 12:55

## 2022-01-24 RX ADMIN — QUETIAPINE FUMARATE 25 MILLIGRAM(S): 200 TABLET, FILM COATED ORAL at 18:06

## 2022-01-24 RX ADMIN — PIPERACILLIN AND TAZOBACTAM 200 GRAM(S): 4; .5 INJECTION, POWDER, LYOPHILIZED, FOR SOLUTION INTRAVENOUS at 06:08

## 2022-01-24 RX ADMIN — PIPERACILLIN AND TAZOBACTAM 200 GRAM(S): 4; .5 INJECTION, POWDER, LYOPHILIZED, FOR SOLUTION INTRAVENOUS at 18:06

## 2022-01-24 RX ADMIN — PIPERACILLIN AND TAZOBACTAM 200 GRAM(S): 4; .5 INJECTION, POWDER, LYOPHILIZED, FOR SOLUTION INTRAVENOUS at 23:03

## 2022-01-24 RX ADMIN — FINASTERIDE 5 MILLIGRAM(S): 5 TABLET, FILM COATED ORAL at 12:55

## 2022-01-24 RX ADMIN — OLANZAPINE 5 MILLIGRAM(S): 15 TABLET, FILM COATED ORAL at 06:24

## 2022-01-24 RX ADMIN — LIDOCAINE 1 PATCH: 4 CREAM TOPICAL at 12:54

## 2022-01-24 RX ADMIN — Medication 1 TABLET(S): at 12:55

## 2022-01-24 RX ADMIN — SENNA PLUS 2 TABLET(S): 8.6 TABLET ORAL at 21:42

## 2022-01-24 RX ADMIN — Medication 250 MILLIGRAM(S): at 19:15

## 2022-01-24 NOTE — PROGRESS NOTE ADULT - PROBLEM SELECTOR PLAN 3
on admission with cr 1.59 (baseline cr 1.3) likely in the setting of hypovolemia. Patient with low sBPs 80-90 and reporting decreased PO intake the past few days    - encourage PO intake  - continue to monitor > worsening Cr on 1/23, given 500cc bolus NS  - Consider urine lytes if no improvement in Cr on 1/24 on admission with cr 1.59 (baseline cr 1.3) likely in the setting of hypovolemia. Patient with low sBPs 80-90 and reporting decreased PO intake the past few days    - encourage PO intake  - continue to monitor > worsening Cr on 1/23, given 500cc bolus NS  - Consider urine lytes if no improvement in Cr on 1/24 > cr downtrending on 1/24

## 2022-01-24 NOTE — PHYSICAL THERAPY INITIAL EVALUATION ADULT - PLANNED THERAPY INTERVENTIONS, PT EVAL
Spoke to patient and verified with patient her appointment time topday at 1:45pm. Patient stated she did not need anything else from the office.   balance training/bed mobility training/gait training/neuromuscular re-education/postural re-education/ROM/strengthening/transfer training

## 2022-01-24 NOTE — PROGRESS NOTE ADULT - ATTENDING COMMENTS
86M w HTN, CKD (1.4), BPH, HTN, p/w malaise, decreased intake from [facility] found to have sepsis 2/2 COVID19 pna and RLL infiltrates and MURRAY on CKD, c/b urinary retention w waite placement 1/23 and delirium    Pt resting in bed. Denies pain. Following commands and oriented to self, month+year. Waite placed overnight and pt required wrist restraints    #Sepsis 2/2 COVID 19 and RLL pna -  Leukocytosis at 28 - increased from yesterday likely 2/2 hemoconcentration  Likely underlying bacterial infection as procalc elevated 13  #MURRAY on CKD (baseline 1.3) - d/t decreased intake and hypotension, ddx also includes retention.  #BPH - on flomax, finasteride, oxybutynin at baseline  #Hypotension - pt does not appear symptomatic. Flomax was held    VTE ppx: On therapeutic lovenox d/t dimer 1200    PLAN  Cr slowly improving. BCx and UCx both NGTD. Delirium multifactorial: urinary retention, pain. Optimize pain  c/w IV Vancomycin and Zosyn for presumed RLL Pna  TOV in AM  Enhanced supervision  f/u biomarkers - procalc, CRP, Dimer    DISPO: Return to [facility] pending improvement in MURRAY, TOV,

## 2022-01-24 NOTE — PHYSICAL THERAPY INITIAL EVALUATION ADULT - DIAGNOSIS, PT EVAL
5A: Primary Prevention/Risk Reduction for Loss of Balance and Falling , 6B: Impaired Aerobic Capacity/Endurance Associated with Deconditioning

## 2022-01-24 NOTE — CONSULT NOTE ADULT - ASSESSMENT
per Internal Medicine    85 yo M w/ PMH CHF (Unknown EF), HTN, CKD (baseline Cr 1.3), BPH presented w/ right-sided CP and back bharat along with generalized weakness/poor PO intake, found with sepsis i/s/o COVID PNA along with RLL HAP - uptrending WBC, as well as prerenal MURRAY, course c/b urinary retention, s/p waite placement    Problem/Plan - 1:  ·  Problem: Sepsis due to pneumonia.   ·  Plan: on admission with BP 87/57, WBC 24.53 with neutrophilic predom 87.6%. Lactate 1.0. CXR RLL infiltrate. UA unremarkable. 94% on room air->97% on 2LNC. Right basilar crackles on exam. COVID +, Procal 0.46  MRSA positive  uptrending WBCs on 1/23    - c/w Vancomycin 1g IV and Zosyn 3.375g IV > continue vancomycin and zosyn to cover for bacterial pneumonia given RLL opacity and elevated procal   - f/u urine legionella > negative  - f/u blood cultures > NGTD.    Problem/Plan - 2:  ·  Problem: 2019 novel coronavirus disease (COVID-19).   ·  Plan: on admission with BP 87/57, WBC 24.53 with neutrophilic predom 87.6%. Lactate 1.0. CXR RLL infiltrate. UA unremarkable. 94% on room air->97% on 2LNC. Right basilar crackles on exam. COVID +, Procal 0.46    - No need for treatment with DEX or REM  - monitor if need for supplemental oxygen.    Problem/Plan - 3:  ·  Problem: Acute kidney injury superimposed on CKD.   ·  Plan: on admission with cr 1.59 (baseline cr 1.3) likely in the setting of hypovolemia. Patient with low sBPs 80-90 and reporting decreased PO intake the past few days    - encourage PO intake  - continue to monitor > worsening Cr on 1/23, given 500cc bolus NS  - Consider urine lytes if no improvement in Cr on 1/24.    Problem/Plan - 4:  ·  Problem: CHF (congestive heart failure).   ·  Plan: reported charted history of CHF, EF unknown, pt denies h/o CHF and reports normal ECHO. BNP 2006. No JVD, crackles, or LE edema on exam. Cardiac enzymes negative. EKG with no acute ischemic changes. CXR with RLL infiltrate and does not appear to be overload in nature    - does not appear to be in exacerbation at this time > euvolemic.    Problem/Plan - 5:  ·  Problem: BPH (benign prostatic hyperplasia).   ·  Plan: history of BPH     - hold home tamsulosin 0.4mg daily, oxybutynin 5mg daily given low BPs  - c/w Finasteride 5mg daily  -     #Urinary retention  *read chart note for full details*    - consider restarting BPH meds if blood pressure goes up  - c/w waite.    Problem/Plan - 6:  ·  Problem: HLD (hyperlipidemia).   ·  Plan: history of HLD    - continue home Rosuvastatin 10mg daily (atorvastatin 40mg daily).    Problem/Plan - 7:  ·  Problem: Prophylactic measure.   ·  Plan: F: None  E: Replete K<4, Mg<2  N: DASH/TLC diet  DVT ppx: Lovenox 40mg subq  Code: FULL

## 2022-01-24 NOTE — PROGRESS NOTE ADULT - ASSESSMENT
85 yo M w/ PMH CHF (Unknown EF), HTN, CKD (baseline Cr 1.3), BPH presented w/ right-sided CP and back bharat along with generalized weakness/poor PO intake, found with sepsis i/s/o COVID PNA along with RLL HAP - uptrending WBC, as well as MURRAY - worsening, most likely prerenal, currently euvolemic.  85 yo M w/ PMH CHF (Unknown EF), HTN, CKD (baseline Cr 1.3), BPH presented w/ right-sided CP and back bharat along with generalized weakness/poor PO intake, found with sepsis i/s/o COVID PNA along with RLL HAP - uptrending WBC, as well as prerenal MURRAY, course c/b urinary retention, s/p waite placement 87 yo M w/ PMH CHF (Unknown EF), HTN, CKD (baseline Cr 1.3), BPH presented from [facility] w/ right-sided CP and back bharat along with generalized weakness/poor PO intake, found with sepsis i/s/o COVID PNA along with RLL HAP - downtrending WBC, as well as prerenal MURRAY, course c/b urinary retention, s/p waite placement, course c/b agitation, on restraints

## 2022-01-24 NOTE — PROGRESS NOTE ADULT - PROBLEM SELECTOR PLAN 5
history of BPH     - hold home tamsulosin 0.4mg daily, oxybutynin 5mg daily given low BPs  - c/w Finasteride 5mg daily  -     #Urinary retention  *read chart note for full details*    - consider restarting BPH meds if blood pressure goes up  - c/w waite history of BPH     - hold home oxybutynin 5mg, consider restarting once BPs normalize  - c/w Finasteride 5 mg qd  - c/w tamsulosin 0.4 mg qd    #Urinary retention  *read chart note for full details*    - consider restarting BPH meds if blood pressure goes up  - c/w waite

## 2022-01-24 NOTE — PHYSICAL THERAPY INITIAL EVALUATION ADULT - GENERAL OBSERVATIONS, REHAB EVAL
Admitted PNA, chest pain, +covid. Pt rcvd semi supine, +B wrist restraints, +waite, +bedalarm, +IV, +3L NC satting 97%. Pt agreeable to PT, A&Ox2, garbled speech. Tolerated session fairly, demo modA bed mob, maxA transfers

## 2022-01-24 NOTE — PHYSICAL THERAPY INITIAL EVALUATION ADULT - ADDITIONAL COMMENTS
Pt is poor historian. Per H&P from [facility] as resident. Pt reports he used to use a RW and a cane, states he walks "all over". Unable to determine how much assist needed for ADLs, inconclusive answer to questions, garbled speech.

## 2022-01-24 NOTE — PROGRESS NOTE ADULT - PROBLEM SELECTOR PLAN 7
F: None  E: Replete K<4, Mg<2  N: DASH/TLC diet  DVT ppx: Lovenox 40mg subq  Code: FULL  Dispo: Pending PT F: None  E: Replete K<4, Mg<2  N: DASH/TLC diet  DVT ppx: Lovenox 50mg subq qd  Code: FULL  Dispo: Pending PT

## 2022-01-24 NOTE — PROGRESS NOTE ADULT - PROBLEM SELECTOR PLAN 4
reported charted history of CHF, EF unknown, pt denies h/o CHF and reports normal ECHO. BNP 2006. No JVD, crackles, or LE edema on exam. Cardiac enzymes negative. EKG with no acute ischemic changes. CXR with RLL infiltrate and does not appear to be overload in nature    - does not appear to be in exacerbation at this time > euvolemic

## 2022-01-24 NOTE — PHYSICAL THERAPY INITIAL EVALUATION ADULT - PERTINENT HX OF CURRENT PROBLEM, REHAB EVAL
87 yo M w/ PMH CHF (Unknown EF), HTN, CKD (baseline Cr 1.3), BPH presented w/ right-sided CP and back bharat along with generalized weakness/poor PO intake, found with sepsis i/s/o COVID PNA along with RLL HAP

## 2022-01-24 NOTE — PROGRESS NOTE ADULT - PROBLEM SELECTOR PLAN 2
on admission with BP 87/57, WBC 24.53 with neutrophilic predom 87.6%. Lactate 1.0. CXR RLL infiltrate. UA unremarkable. 94% on room air->97% on 2LNC. Right basilar crackles on exam. COVID +, Procal 0.46    - No need for treatment with DEX or REM  - monitor if need for supplemental oxygen

## 2022-01-24 NOTE — PHYSICAL THERAPY INITIAL EVALUATION ADULT - IMPAIRMENTS FOUND, PT EVAL
aerobic capacity/endurance/cognitive impairment/ergonomics and body mechanics/gait, locomotion, and balance/muscle strength/poor safety awareness/posture/ventilation and respiration/gas exchange

## 2022-01-24 NOTE — PROGRESS NOTE ADULT - PROBLEM SELECTOR PLAN 1
on admission with BP 87/57, WBC 24.53 with neutrophilic predom 87.6%. Lactate 1.0. CXR RLL infiltrate. UA unremarkable. 94% on room air->97% on 2LNC. Right basilar crackles on exam. COVID +, Procal 0.46  MRSA positive  uptrending WBCs on 1/23    - c/w Vancomycin 1g IV and Zosyn 3.375g IV > continue vancomycin and zosyn to cover for bacterial pneumonia given RLL opacity and elevated procal   - f/u urine legionella > negative  - f/u blood cultures > NGTD on admission with BP 87/57, WBC 24.53 with neutrophilic predom 87.6%. Lactate 1.0. CXR RLL infiltrate. UA unremarkable. 94% on room air->97% on 2LNC. Right basilar crackles on exam. COVID +, Procal 0.46  MRSA positive  downtrending WBC on 1/24/22  SpO2 95% on RA on 1/24/22    - c/w Vancomycin 1g IV and Zosyn 3.375g IV > continue vancomycin and zosyn to cover for bacterial pneumonia given RLL opacity and elevated procal   - f/u urine legionella > negative  - f/u blood cultures > NGTD

## 2022-01-24 NOTE — PROGRESS NOTE ADULT - SUBJECTIVE AND OBJECTIVE BOX
*INCOMPLETE*    OVERNIGHT EVENTS: Refer to "CHART NOTE"    SUBJECTIVE / INTERVAL HPI: Patient seen and examined at bedside.   Pt denies F/C, SOB/cough.   Pt reports continued right pleuritic chest and back pain.   Pt states he does not have an appetite which is unusual and feels overall weak. Pt denies any other symptoms.       MEDICATIONS  (STANDING):  atorvastatin 40 milliGRAM(s) Oral at bedtime  enoxaparin Injectable 40 milliGRAM(s) SubCutaneous every 24 hours  finasteride 5 milliGRAM(s) Oral daily  lidocaine   4% Patch 1 Patch Transdermal daily  melatonin 10 milliGRAM(s) Oral at bedtime  multivitamin 1 Tablet(s) Oral daily  piperacillin/tazobactam IVPB.. 3.375 Gram(s) IV Intermittent every 6 hours  sodium chloride 0.9%. 1000 milliLiter(s) (80 mL/Hr) IV Continuous <Continuous>  sodium chloride 0.9%. 1000 milliLiter(s) (75 mL/Hr) IV Continuous <Continuous>  vancomycin  IVPB 1000 milliGRAM(s) IV Intermittent every 24 hours    MEDICATIONS  (PRN):  acetaminophen     Tablet .. 650 milliGRAM(s) Oral every 6 hours PRN Mild Pain (1 - 3), Moderate Pain (4 - 6)    Allergies    No Known Allergies    Intolerances        VITAL SIGNS:  Vital Signs Last 24 Hrs  T(C): 36.6 (2022 05:00), Max: 37.2 (2022 20:05)  T(F): 97.9 (2022 05:00), Max: 98.9 (2022 20:05)  HR: 103 (2022 06:54) (84 - 171)  BP: 141/88 (2022 05:00) (91/59 - 141/88)  BP(mean): --  RR: 18 (2022 06:54) (17 - 18)  SpO2: 99% (2022 06:54) (94% - 99%)      22 @ 07:01  -  22 @ 06:55  --------------------------------------------------------  IN: 0 mL / OUT: 1250 mL / NET: -1250 mL        PHYSICAL EXAM:  Constitutional: cachectic, NAD, comfortable in bed.  HEENT: NC/AT, PERRLA, EOMI, no conjunctival pallor or scleral icterus, dry MM  Neck: Supple, no JVD  Respiratory: Right lower lobe basilar crackles. No w/r/r, TTP on right side of chest.   Cardiovascular: RRR, normal S1 and S2, no m/r/g.   Gastrointestinal: +BS, soft NTND, no guarding or rebound tenderness, no palpable masses   Extremities: wwp; no cyanosis, clubbing or edema.   Vascular: Pulses equal and strong throughout.   Neurological: AAOx3, no CN deficits, strength and sensation intact throughout.   Skin: No gross skin abnormalities or rashes      LABS:                        12.5   27.96 )-----------( 194      ( 2022 08:05 )             39.0     -    137  |  100  |  32<H>  ----------------------------<  177<H>  3.9   |  24  |  1.62<H>    Ca    8.6      2022 20:42  Phos  4.0       Mg     2.0         TPro  6.5  /  Alb  2.8<L>  /  TBili  1.1  /  DBili  x   /  AST  21  /  ALT  16  /  AlkPhos  92  -23    PT/INR - ( 2022 18:25 )   PT: 15.1 sec;   INR: 1.27          PTT - ( 2022 18:25 )  PTT:28.2 sec  Urinalysis Basic - ( 2022 19:37 )    Color: Yellow / Appearance: SL Cloudy / S.025 / pH: x  Gluc: x / Ketone: NEGATIVE  / Bili: Negative / Urobili: 0.2 E.U./dL   Blood: x / Protein: 30 mg/dL / Nitrite: NEGATIVE   Leuk Esterase: NEGATIVE / RBC: Many /HPF / WBC < 5 /HPF   Sq Epi: x / Non Sq Epi: x / Bacteria: Few /HPF      CAPILLARY BLOOD GLUCOSE              RADIOLOGY & ADDITIONAL TESTS: Reviewed. *INCOMPLETE*    OVERNIGHT EVENTS: Refer to "CHART NOTE"    SUBJECTIVE / INTERVAL HPI: Patient seen and examined at bedside.   Pt denies F/C, SOB/cough.   Pt reports continued right pleuritic chest and back pain.   Pt states he does not have an appetite which is unusual and feels overall weak. Pt denies any other symptoms.       MEDICATIONS  (STANDING):  atorvastatin 40 milliGRAM(s) Oral at bedtime  enoxaparin Injectable 40 milliGRAM(s) SubCutaneous every 24 hours  finasteride 5 milliGRAM(s) Oral daily  lidocaine   4% Patch 1 Patch Transdermal daily  melatonin 10 milliGRAM(s) Oral at bedtime  multivitamin 1 Tablet(s) Oral daily  piperacillin/tazobactam IVPB.. 3.375 Gram(s) IV Intermittent every 6 hours  sodium chloride 0.9%. 1000 milliLiter(s) (80 mL/Hr) IV Continuous <Continuous>  sodium chloride 0.9%. 1000 milliLiter(s) (75 mL/Hr) IV Continuous <Continuous>  vancomycin  IVPB 1000 milliGRAM(s) IV Intermittent every 24 hours    MEDICATIONS  (PRN):  acetaminophen     Tablet .. 650 milliGRAM(s) Oral every 6 hours PRN Mild Pain (1 - 3), Moderate Pain (4 - 6)    Allergies    No Known Allergies    Intolerances        VITAL SIGNS:  Vital Signs Last 24 Hrs  T(C): 36.6 (2022 05:00), Max: 37.2 (2022 20:05)  T(F): 97.9 (2022 05:00), Max: 98.9 (2022 20:05)  HR: 103 (2022 06:54) (84 - 171)  BP: 141/88 (2022 05:00) (91/59 - 141/88)  BP(mean): --  RR: 18 (2022 06:54) (17 - 18)  SpO2: 99% (2022 06:54) (94% - 99%)      22 @ 07:01  -  22 @ 06:55  --------------------------------------------------------  IN: 0 mL / OUT: 1250 mL / NET: -1250 mL        PHYSICAL EXAM:  Constitutional: NAD, with restraints  HEENT: NC/AT, PERRLA, EOMI, no conjunctival pallor or scleral icterus, dry MM  Neck: Supple, no JVD  Respiratory: Right lower lobe basilar crackles. No w/r/r, TTP on right side of chest.   Cardiovascular: RRR, normal S1 and S2, no m/r/g.   Gastrointestinal: +BS, soft NTND, no guarding or rebound tenderness, no palpable masses   : Shepard in place  Extremities: wwp; no cyanosis, clubbing or edema.   Vascular: Pulses equal and strong throughout.   Neurological: AAOx3, no CN deficits, strength and sensation intact throughout.   Skin: No gross skin abnormalities or rashes      LABS:                        12.5   27.96 )-----------( 194      ( 2022 08:05 )             39.0     -    137  |  100  |  32<H>  ----------------------------<  177<H>  3.9   |  24  |  1.62<H>    Ca    8.6      2022 20:42  Phos  4.0       Mg     2.0         TPro  6.5  /  Alb  2.8<L>  /  TBili  1.1  /  DBili  x   /  AST  21  /  ALT  16  /  AlkPhos  92  -23    PT/INR - ( 2022 18:25 )   PT: 15.1 sec;   INR: 1.27          PTT - ( 2022 18:25 )  PTT:28.2 sec  Urinalysis Basic - ( 2022 19:37 )    Color: Yellow / Appearance: SL Cloudy / S.025 / pH: x  Gluc: x / Ketone: NEGATIVE  / Bili: Negative / Urobili: 0.2 E.U./dL   Blood: x / Protein: 30 mg/dL / Nitrite: NEGATIVE   Leuk Esterase: NEGATIVE / RBC: Many /HPF / WBC < 5 /HPF   Sq Epi: x / Non Sq Epi: x / Bacteria: Few /HPF      CAPILLARY BLOOD GLUCOSE              RADIOLOGY & ADDITIONAL TESTS: Reviewed. OVERNIGHT EVENTS: Refer to "CHART NOTE"    SUBJECTIVE / INTERVAL HPI: Patient seen and examined at bedside. Pt on restraints. Pt agitated, refusing to answer questions. Finally answered a few ROS and denied F/C, cough or SOB, and reports right-sided CP.     MEDICATIONS  (STANDING):  atorvastatin 40 milliGRAM(s) Oral at bedtime  enoxaparin Injectable 40 milliGRAM(s) SubCutaneous every 24 hours  finasteride 5 milliGRAM(s) Oral daily  lidocaine   4% Patch 1 Patch Transdermal daily  melatonin 10 milliGRAM(s) Oral at bedtime  multivitamin 1 Tablet(s) Oral daily  piperacillin/tazobactam IVPB.. 3.375 Gram(s) IV Intermittent every 6 hours  sodium chloride 0.9%. 1000 milliLiter(s) (80 mL/Hr) IV Continuous <Continuous>  sodium chloride 0.9%. 1000 milliLiter(s) (75 mL/Hr) IV Continuous <Continuous>  vancomycin  IVPB 1000 milliGRAM(s) IV Intermittent every 24 hours    MEDICATIONS  (PRN):  acetaminophen     Tablet .. 650 milliGRAM(s) Oral every 6 hours PRN Mild Pain (1 - 3), Moderate Pain (4 - 6)    Allergies    No Known Allergies    Intolerances        VITAL SIGNS:  Vital Signs Last 24 Hrs  T(C): 36.6 (2022 05:00), Max: 37.2 (2022 20:05)  T(F): 97.9 (2022 05:00), Max: 98.9 (2022 20:05)  HR: 103 (2022 06:54) (84 - 171)  BP: 141/88 (2022 05:00) (91/59 - 141/88)  BP(mean): --  RR: 18 (2022 06:54) (17 - 18)  SpO2: 99% (2022 06:54) (94% - 99%)      22 @ 07:01  -  22 @ 06:55  --------------------------------------------------------  IN: 0 mL / OUT: 1250 mL / NET: -1250 mL        PHYSICAL EXAM:  Constitutional: NAD, with restraints  HEENT: NC/AT, PERRLA, EOMI, no conjunctival pallor or scleral icterus, dry MM  Neck: Supple, no JVD  Respiratory: Right lower lobe basilar crackles. No w/r/r, TTP on right axillary region  Cardiovascular: RRR, normal S1 and S2, no m/r/g.   Gastrointestinal: +BS, soft NTND, no guarding or rebound tenderness, no palpable masses   : Shepard in place  Extremities: wwp; no cyanosis, clubbing or edema.   Vascular: Pulses equal and strong throughout.   Neurological: AAOx3, no CN deficits, strength and sensation intact throughout.   Skin: No gross skin abnormalities or rashes      LABS:                        12.5   27.96 )-----------( 194      ( 2022 08:05 )             39.0     01-    137  |  100  |  32<H>  ----------------------------<  177<H>  3.9   |  24  |  1.62<H>    Ca    8.6      2022 20:42  Phos  4.0     -  Mg     2.0     -    TPro  6.5  /  Alb  2.8<L>  /  TBili  1.1  /  DBili  x   /  AST  21  /  ALT  16  /  AlkPhos  92  -23    PT/INR - ( 2022 18:25 )   PT: 15.1 sec;   INR: 1.27          PTT - ( 2022 18:25 )  PTT:28.2 sec  Urinalysis Basic - ( 2022 19:37 )    Color: Yellow / Appearance: SL Cloudy / S.025 / pH: x  Gluc: x / Ketone: NEGATIVE  / Bili: Negative / Urobili: 0.2 E.U./dL   Blood: x / Protein: 30 mg/dL / Nitrite: NEGATIVE   Leuk Esterase: NEGATIVE / RBC: Many /HPF / WBC < 5 /HPF   Sq Epi: x / Non Sq Epi: x / Bacteria: Few /HPF      CAPILLARY BLOOD GLUCOSE              RADIOLOGY & ADDITIONAL TESTS: Reviewed.

## 2022-01-24 NOTE — CONSULT NOTE ADULT - SUBJECTIVE AND OBJECTIVE BOX
Patient is a 86y old  Male who presents with a chief complaint of sepsis due to pneumonia (2022 06:54)       HPI:  86M PMH CHF, HTN, CKD (baseline Cr 1.3), BPH  [facility] with a complaint of pain when breathing and generalized weakness for the past 2 days. He states that when he takes a deep breath he feel pain on the right side of his chest but otherwise denies nay shortness of breath or pain at rest. He says he has been eating less the past few days and feeling more weak. Patient has been a resident of [facility] for years and has no fevers, rhinorrhea, cough, abdominal pain, n/v/d/c, edema. Patient has been vaccinated x2 for COVID. He is full code.    ED Course:   Vitals: T 98.6F (rectal), HR 86, BP 98/54, RR 18, O2 94% on RA  Labs: WBC 24.53, Neutrophils 87.6%, hb 11.7, plts 166, Lactate 1.0, Na 136, K 4.0, Cl 103, CO2 22, AG 13, BUN/Cr 26/1.59 (cr 1.3 baseline), glucose 122, trops 0.01, BNP 2006, procal 0.46, UA negative, COVID+  EKG: NSR HR 73, no acute ischemic changes, qtc 416  Imaging: CXR RLL infiltrate  Interventions: Vancomycin 1g IV and Zosyn 3.375g IV, (reported 500cc bolus before arrival)   (2022 22:27)      PAST MEDICAL & SURGICAL HISTORY:  BPH (benign prostatic hyperplasia)    HLD (hyperlipidemia)    PVD (peripheral vascular disease)    CKD (chronic kidney disease)    Heart failure    H/O hernia repair        MEDICATIONS  (STANDING):  atorvastatin 40 milliGRAM(s) Oral at bedtime  enoxaparin Injectable 40 milliGRAM(s) SubCutaneous every 24 hours  finasteride 5 milliGRAM(s) Oral daily  lidocaine   4% Patch 1 Patch Transdermal daily  melatonin 10 milliGRAM(s) Oral at bedtime  multivitamin 1 Tablet(s) Oral daily  piperacillin/tazobactam IVPB.. 3.375 Gram(s) IV Intermittent every 6 hours  sodium chloride 0.9%. 1000 milliLiter(s) (80 mL/Hr) IV Continuous <Continuous>  sodium chloride 0.9%. 1000 milliLiter(s) (75 mL/Hr) IV Continuous <Continuous>  tamsulosin 0.4 milliGRAM(s) Oral at bedtime  vancomycin  IVPB 1000 milliGRAM(s) IV Intermittent every 24 hours    MEDICATIONS  (PRN):  acetaminophen     Tablet .. 650 milliGRAM(s) Oral every 6 hours PRN Mild Pain (1 - 3), Moderate Pain (4 - 6)      FAMILY HISTORY:      CBC Full  -  ( 2022 07:24 )  WBC Count : 25.06 K/uL  RBC Count : 4.08 M/uL  Hemoglobin : 13.1 g/dL  Hematocrit : 40.6 %  Platelet Count - Automated : 217 K/uL  Mean Cell Volume : 99.5 fl  Mean Cell Hemoglobin : 32.1 pg  Mean Cell Hemoglobin Concentration : 32.3 gm/dL  Auto Neutrophil # : 22.07 K/uL  Auto Lymphocyte # : 0.91 K/uL  Auto Monocyte # : 1.69 K/uL  Auto Eosinophil # : 0.00 K/uL  Auto Basophil # : 0.08 K/uL  Auto Neutrophil % : 88.2 %  Auto Lymphocyte % : 3.6 %  Auto Monocyte % : 6.7 %  Auto Eosinophil % : 0.0 %  Auto Basophil % : 0.3 %          138  |  103  |  31<H>  ----------------------------<  109<H>  3.9   |  21<L>  |  1.55<H>    Ca    9.0      2022 07:24  Phos  3.2     -  Mg     2.1         TPro  6.8  /  Alb  2.6<L>  /  TBili  1.0  /  DBili  x   /  AST  62<H>  /  ALT  34  /  AlkPhos  126<H>        Urinalysis Basic - ( 2022 19:37 )    Color: Yellow / Appearance: SL Cloudy / S.025 / pH: x  Gluc: x / Ketone: NEGATIVE  / Bili: Negative / Urobili: 0.2 E.U./dL   Blood: x / Protein: 30 mg/dL / Nitrite: NEGATIVE   Leuk Esterase: NEGATIVE / RBC: Many /HPF / WBC < 5 /HPF   Sq Epi: x / Non Sq Epi: x / Bacteria: Few /HPF          Radiology:    < from: Xray Chest 1 View-PORTABLE IMMEDIATE (Xray Chest 1 View-PORTABLE IMMEDIATE .) (22 @ 18:36) >  ACC: [id number] EXAM:  XR CHEST PORTABLE IMMED 1V                          PROCEDURE DATE:  2022          INTERPRETATION:  Clinical History: Chest pain    Frontal examination of the chest demonstrates the heart to be within   normal limits in transverse diameter. Right basilar infiltrates.   Degenerative changes thoracic spine. Calcification aortic knob. Chronic   interstitial changes.    IMPRESSION: Right basilar infiltrates          Vital Signs Last 24 Hrs  T(C): 36.6 (2022 05:00), Max: 37.2 (2022 20:05)  T(F): 97.9 (2022 05:00), Max: 98.9 (2022 20:05)  HR: 103 (2022 06:54) (84 - 171)  BP: 141/88 (2022 05:00) (91/59 - 141/88)  BP(mean): --  RR: 18 (2022 06:54) (17 - 18)  SpO2: 99% (2022 06:54) (94% - 99%)        REVIEW OF SYSTEMS:    CONSTITUTIONAL: No fever, weight loss, or fatigue  EYES: No eye pain, visual disturbances, or discharge  ENMT:  No difficulty hearing, tinnitus, vertigo; No sinus or throat pain  NECK: No pain or stiffness  BREASTS: No pain, masses, or nipple discharge  RESPIRATORY:  per HPI  CARDIOVASCULAR: No chest pain, palpitations, dizziness, or leg swelling  GASTROINTESTINAL: No abdominal or epigastric pain. No nausea, vomiting, or hematemesis; No diarrhea or constipation. No melena or hematochezia.  GENITOURINARY: No dysuria, frequency, hematuria, or incontinence  NEUROLOGICAL: No headaches, memory loss, loss of strength, numbness, or tremors  SKIN: No itching, burning, rashes, or lesions   LYMPH NODES: No enlarged glands  ENDOCRINE: No heat or cold intolerance; No hair loss  MUSCULOSKELETAL: No joint pain or swelling; No muscle, back, or extremity pain  PSYCHIATRIC: No depression, anxiety, mood swings, or difficulty sleeping  HEME/LYMPH: No easy bruising, or bleeding gums  ALLERGY AND IMMUNOLOGIC: No hives or eczema  VASCULAR: no swelling, erythema,           Physical Exam:  on COVID isolation, in accordance with current standards limiting patient contact, please refer to exam performed on 2022    Constitutional: NAD, with restraints  HEENT: NC/AT, PERRLA, EOMI, no conjunctival pallor or scleral icterus, dry MM  Neck: Supple, no JVD  Respiratory: Right lower lobe basilar crackles. No w/r/r, TTP on right side of chest.   Cardiovascular: RRR, normal S1 and S2, no m/r/g.   Gastrointestinal: +BS, soft NTND, no guarding or rebound tenderness, no palpable masses   : Shepard in place  Extremities: wwp; no cyanosis, clubbing or edema.   Vascular: Pulses equal and strong throughout.   Neurological: AAOx3, no CN deficits, strength and sensation intact throughout.   Skin: No gross skin abnormalities or rashes        PM&R Impression:    1) deconditioned  2) no focal weakness    Recommendations/ Plan :    1) Physical therapy focusing on therapeutic exercises, bed mobility/transfer out of bed evaluation, progressive ambulation with assistive devices prn.    2) Anticipated Disposition Plan/Recs:   return to [facility]

## 2022-01-25 ENCOUNTER — TRANSCRIPTION ENCOUNTER (OUTPATIENT)
Age: 87
End: 2022-01-25

## 2022-01-25 LAB
ALBUMIN SERPL ELPH-MCNC: 2.6 G/DL — LOW (ref 3.3–5)
ALP SERPL-CCNC: 122 U/L — HIGH (ref 40–120)
ALT FLD-CCNC: 25 U/L — SIGNIFICANT CHANGE UP (ref 10–45)
ANION GAP SERPL CALC-SCNC: 12 MMOL/L — SIGNIFICANT CHANGE UP (ref 5–17)
AST SERPL-CCNC: 39 U/L — SIGNIFICANT CHANGE UP (ref 10–40)
BASOPHILS # BLD AUTO: 0.06 K/UL — SIGNIFICANT CHANGE UP (ref 0–0.2)
BASOPHILS NFR BLD AUTO: 0.3 % — SIGNIFICANT CHANGE UP (ref 0–2)
BILIRUB SERPL-MCNC: 0.9 MG/DL — SIGNIFICANT CHANGE UP (ref 0.2–1.2)
BUN SERPL-MCNC: 31 MG/DL — HIGH (ref 7–23)
CALCIUM SERPL-MCNC: 8.8 MG/DL — SIGNIFICANT CHANGE UP (ref 8.4–10.5)
CHLORIDE SERPL-SCNC: 105 MMOL/L — SIGNIFICANT CHANGE UP (ref 96–108)
CO2 SERPL-SCNC: 23 MMOL/L — SIGNIFICANT CHANGE UP (ref 22–31)
CREAT SERPL-MCNC: 1.6 MG/DL — HIGH (ref 0.5–1.3)
CRP SERPL-MCNC: 300.1 MG/L — HIGH (ref 0–4)
D DIMER BLD IA.RAPID-MCNC: 1075 NG/ML DDU — HIGH
EOSINOPHIL # BLD AUTO: 0 K/UL — SIGNIFICANT CHANGE UP (ref 0–0.5)
EOSINOPHIL NFR BLD AUTO: 0 % — SIGNIFICANT CHANGE UP (ref 0–6)
FERRITIN SERPL-MCNC: 1512 NG/ML — HIGH (ref 30–400)
GLUCOSE SERPL-MCNC: 131 MG/DL — HIGH (ref 70–99)
HCT VFR BLD CALC: 37.6 % — LOW (ref 39–50)
HGB BLD-MCNC: 12.2 G/DL — LOW (ref 13–17)
IMM GRANULOCYTES NFR BLD AUTO: 1.3 % — SIGNIFICANT CHANGE UP (ref 0–1.5)
LYMPHOCYTES # BLD AUTO: 0.98 K/UL — LOW (ref 1–3.3)
LYMPHOCYTES # BLD AUTO: 4.5 % — LOW (ref 13–44)
MAGNESIUM SERPL-MCNC: 2.2 MG/DL — SIGNIFICANT CHANGE UP (ref 1.6–2.6)
MCHC RBC-ENTMCNC: 31.7 PG — SIGNIFICANT CHANGE UP (ref 27–34)
MCHC RBC-ENTMCNC: 32.4 GM/DL — SIGNIFICANT CHANGE UP (ref 32–36)
MCV RBC AUTO: 97.7 FL — SIGNIFICANT CHANGE UP (ref 80–100)
MONOCYTES # BLD AUTO: 1.48 K/UL — HIGH (ref 0–0.9)
MONOCYTES NFR BLD AUTO: 6.8 % — SIGNIFICANT CHANGE UP (ref 2–14)
NEUTROPHILS # BLD AUTO: 19.04 K/UL — HIGH (ref 1.8–7.4)
NEUTROPHILS NFR BLD AUTO: 87.1 % — HIGH (ref 43–77)
NRBC # BLD: 0 /100 WBCS — SIGNIFICANT CHANGE UP (ref 0–0)
PHOSPHATE SERPL-MCNC: 3.5 MG/DL — SIGNIFICANT CHANGE UP (ref 2.5–4.5)
PLATELET # BLD AUTO: 236 K/UL — SIGNIFICANT CHANGE UP (ref 150–400)
POTASSIUM SERPL-MCNC: 3.8 MMOL/L — SIGNIFICANT CHANGE UP (ref 3.5–5.3)
POTASSIUM SERPL-SCNC: 3.8 MMOL/L — SIGNIFICANT CHANGE UP (ref 3.5–5.3)
PROT SERPL-MCNC: 6.5 G/DL — SIGNIFICANT CHANGE UP (ref 6–8.3)
RBC # BLD: 3.85 M/UL — LOW (ref 4.2–5.8)
RBC # FLD: 14.6 % — HIGH (ref 10.3–14.5)
SODIUM SERPL-SCNC: 140 MMOL/L — SIGNIFICANT CHANGE UP (ref 135–145)
VANCOMYCIN TROUGH SERPL-MCNC: 15.1 UG/ML — SIGNIFICANT CHANGE UP (ref 10–20)
WBC # BLD: 21.84 K/UL — HIGH (ref 3.8–10.5)
WBC # FLD AUTO: 21.84 K/UL — HIGH (ref 3.8–10.5)

## 2022-01-25 PROCEDURE — 99232 SBSQ HOSP IP/OBS MODERATE 35: CPT

## 2022-01-25 RX ORDER — LANOLIN ALCOHOL/MO/W.PET/CERES
1 CREAM (GRAM) TOPICAL
Qty: 0 | Refills: 0 | DISCHARGE

## 2022-01-25 RX ORDER — ROSUVASTATIN CALCIUM 5 MG/1
1 TABLET ORAL
Qty: 0 | Refills: 0 | DISCHARGE

## 2022-01-25 RX ORDER — TETRABENAZINE 12.5 MG/1
1 TABLET ORAL
Qty: 0 | Refills: 0 | DISCHARGE

## 2022-01-25 RX ORDER — TAMSULOSIN HYDROCHLORIDE 0.4 MG/1
1 CAPSULE ORAL
Qty: 0 | Refills: 0 | DISCHARGE

## 2022-01-25 RX ORDER — FINASTERIDE 5 MG/1
1 TABLET, FILM COATED ORAL
Qty: 0 | Refills: 0 | DISCHARGE

## 2022-01-25 RX ORDER — OXYBUTYNIN CHLORIDE 5 MG
1 TABLET ORAL
Qty: 0 | Refills: 0 | DISCHARGE

## 2022-01-25 RX ORDER — ACETAMINOPHEN 500 MG
2 TABLET ORAL
Qty: 0 | Refills: 0 | DISCHARGE

## 2022-01-25 RX ORDER — MECLIZINE HCL 12.5 MG
0.5 TABLET ORAL
Qty: 0 | Refills: 0 | DISCHARGE

## 2022-01-25 RX ORDER — VANCOMYCIN HCL 1 G
1000 VIAL (EA) INTRAVENOUS EVERY 24 HOURS
Refills: 0 | Status: DISCONTINUED | OUTPATIENT
Start: 2022-01-25 | End: 2022-01-26

## 2022-01-25 RX ADMIN — POLYETHYLENE GLYCOL 3350 17 GRAM(S): 17 POWDER, FOR SOLUTION ORAL at 11:42

## 2022-01-25 RX ADMIN — ATORVASTATIN CALCIUM 40 MILLIGRAM(S): 80 TABLET, FILM COATED ORAL at 21:00

## 2022-01-25 RX ADMIN — TAMSULOSIN HYDROCHLORIDE 0.4 MILLIGRAM(S): 0.4 CAPSULE ORAL at 21:00

## 2022-01-25 RX ADMIN — QUETIAPINE FUMARATE 25 MILLIGRAM(S): 200 TABLET, FILM COATED ORAL at 11:45

## 2022-01-25 RX ADMIN — Medication 1 TABLET(S): at 11:45

## 2022-01-25 RX ADMIN — SENNA PLUS 2 TABLET(S): 8.6 TABLET ORAL at 21:00

## 2022-01-25 RX ADMIN — LIDOCAINE 1 PATCH: 4 CREAM TOPICAL at 20:04

## 2022-01-25 RX ADMIN — ENOXAPARIN SODIUM 50 MILLIGRAM(S): 100 INJECTION SUBCUTANEOUS at 16:37

## 2022-01-25 RX ADMIN — Medication 250 MILLIGRAM(S): at 23:02

## 2022-01-25 RX ADMIN — LIDOCAINE 1 PATCH: 4 CREAM TOPICAL at 11:45

## 2022-01-25 RX ADMIN — PIPERACILLIN AND TAZOBACTAM 200 GRAM(S): 4; .5 INJECTION, POWDER, LYOPHILIZED, FOR SOLUTION INTRAVENOUS at 17:07

## 2022-01-25 RX ADMIN — PIPERACILLIN AND TAZOBACTAM 200 GRAM(S): 4; .5 INJECTION, POWDER, LYOPHILIZED, FOR SOLUTION INTRAVENOUS at 11:44

## 2022-01-25 RX ADMIN — FINASTERIDE 5 MILLIGRAM(S): 5 TABLET, FILM COATED ORAL at 11:45

## 2022-01-25 RX ADMIN — PIPERACILLIN AND TAZOBACTAM 200 GRAM(S): 4; .5 INJECTION, POWDER, LYOPHILIZED, FOR SOLUTION INTRAVENOUS at 05:53

## 2022-01-25 RX ADMIN — Medication 10 MILLIGRAM(S): at 21:00

## 2022-01-25 NOTE — PROGRESS NOTE ADULT - PROBLEM SELECTOR PLAN 6
history of HLD    - continue home Rosuvastatin 10mg daily (atorvastatin 40mg daily) history of HLD    - continue home Rosuvastatin 10mg daily (atorvastatin 40mg daily)      #Delirium  Multifactorial i/s/o sepsis, urinary retention and pain  Pt on restraints starting 1/23 which was switched to mittens 1/24 AM and mittens removed 1/25 AM    - do not use restraints/mittens as patient needs to go to [facility] rehab and need 24 hours w/o restraints  - c/w seroquel 25 mg at 7 PM to prevent sundowning  - c/w melatonin 10 mg QHS  - can use zyprexa 5 mg IM if severe agitation or pt can harm self or others. history of HLD    - continue home Rosuvastatin 10mg daily (atorvastatin 40mg daily)      #Delirium  Multifactorial i/s/o sepsis, urinary retention and pain  Pt on restraints starting 1/23 which was switched to mittens 1/24 AM and mittens removed 1/25 AM    - do not use restraints/mittens as patient needs to go to [facility] rehab and need 24 hours w/o restraints  - c/w seroquel 25 mg at 7 PM to prevent sundowning  - c/w melatonin 10 mg QHS  - can use zyprexa 5 mg IM if severe agitation or pt can harm self or others.      #Right upper extremity infiltration  2/2 to peripheral IV line, switched to left side on 1/25    - f/u RUE US

## 2022-01-25 NOTE — PROGRESS NOTE ADULT - SUBJECTIVE AND OBJECTIVE BOX
*INCOMPLETE*    OVERNIGHT EVENTS: NAOE    SUBJECTIVE / INTERVAL HPI: Patient seen and examined at bedside.     MEDICATIONS  (STANDING):  atorvastatin 40 milliGRAM(s) Oral at bedtime  enoxaparin Injectable 50 milliGRAM(s) SubCutaneous every 24 hours  finasteride 5 milliGRAM(s) Oral daily  lidocaine   4% Patch 1 Patch Transdermal daily  melatonin 10 milliGRAM(s) Oral at bedtime  multivitamin 1 Tablet(s) Oral daily  piperacillin/tazobactam IVPB.. 3.375 Gram(s) IV Intermittent every 6 hours  polyethylene glycol 3350 17 Gram(s) Oral daily  QUEtiapine 25 milliGRAM(s) Oral daily  senna 2 Tablet(s) Oral at bedtime  tamsulosin 0.4 milliGRAM(s) Oral at bedtime    MEDICATIONS  (PRN):  acetaminophen     Tablet .. 650 milliGRAM(s) Oral every 6 hours PRN Mild Pain (1 - 3), Moderate Pain (4 - 6)    Allergies    No Known Allergies    Intolerances        VITAL SIGNS:  Vital Signs Last 24 Hrs  T(C): 37.1 (24 Jan 2022 20:08), Max: 37.1 (24 Jan 2022 20:08)  T(F): 98.8 (24 Jan 2022 20:08), Max: 98.8 (24 Jan 2022 20:08)  HR: 92 (24 Jan 2022 20:08) (92 - 103)  BP: 95/58 (24 Jan 2022 20:08) (95/58 - 116/67)  BP(mean): --  RR: 18 (24 Jan 2022 20:08) (18 - 18)  SpO2: 97% (24 Jan 2022 20:08) (93% - 99%)      01-23-22 @ 07:01  -  01-24-22 @ 07:00  --------------------------------------------------------  IN: 0 mL / OUT: 1250 mL / NET: -1250 mL    01-24-22 @ 07:01  -  01-25-22 @ 06:17  --------------------------------------------------------  IN: 0 mL / OUT: 550 mL / NET: -550 mL        PHYSICAL EXAM:  Constitutional: NAD, with restraints  HEENT: NC/AT, PERRLA, EOMI, no conjunctival pallor or scleral icterus, dry MM  Neck: Supple, no JVD  Respiratory: Right lower lobe basilar crackles. No w/r/r, TTP on right axillary region  Cardiovascular: RRR, normal S1 and S2, no m/r/g.   Gastrointestinal: +BS, soft NTND, no guarding or rebound tenderness, no palpable masses   : Shepard in place  Extremities: wwp; no cyanosis, clubbing or edema.   Vascular: Pulses equal and strong throughout.   Neurological: AAOx3, no CN deficits, strength and sensation intact throughout.   Skin: No gross skin abnormalities or rashes      LABS:                        13.1   25.06 )-----------( 217      ( 24 Jan 2022 07:24 )             40.6     01-24    138  |  103  |  31<H>  ----------------------------<  109<H>  3.9   |  21<L>  |  1.55<H>    Ca    9.0      24 Jan 2022 07:24  Phos  3.2     01-24  Mg     2.1     01-24    TPro  6.8  /  Alb  2.6<L>  /  TBili  1.0  /  DBili  x   /  AST  62<H>  /  ALT  34  /  AlkPhos  126<H>  01-24        CAPILLARY BLOOD GLUCOSE              RADIOLOGY & ADDITIONAL TESTS: Reviewed. OVERNIGHT EVENTS: Pt received seroquel 25 mg, melatonin 10 mg.     SUBJECTIVE / INTERVAL HPI: Patient seen and examined at bedside. Pt sleepy/lethargic. Pt denies any F/C, SOB/cough, pain. Limited ROS.     MEDICATIONS  (STANDING):  atorvastatin 40 milliGRAM(s) Oral at bedtime  enoxaparin Injectable 50 milliGRAM(s) SubCutaneous every 24 hours  finasteride 5 milliGRAM(s) Oral daily  lidocaine   4% Patch 1 Patch Transdermal daily  melatonin 10 milliGRAM(s) Oral at bedtime  multivitamin 1 Tablet(s) Oral daily  piperacillin/tazobactam IVPB.. 3.375 Gram(s) IV Intermittent every 6 hours  polyethylene glycol 3350 17 Gram(s) Oral daily  QUEtiapine 25 milliGRAM(s) Oral daily  senna 2 Tablet(s) Oral at bedtime  tamsulosin 0.4 milliGRAM(s) Oral at bedtime    MEDICATIONS  (PRN):  acetaminophen     Tablet .. 650 milliGRAM(s) Oral every 6 hours PRN Mild Pain (1 - 3), Moderate Pain (4 - 6)    Allergies    No Known Allergies    Intolerances        VITAL SIGNS:  Vital Signs Last 24 Hrs  T(C): 37.1 (24 Jan 2022 20:08), Max: 37.1 (24 Jan 2022 20:08)  T(F): 98.8 (24 Jan 2022 20:08), Max: 98.8 (24 Jan 2022 20:08)  HR: 92 (24 Jan 2022 20:08) (92 - 103)  BP: 95/58 (24 Jan 2022 20:08) (95/58 - 116/67)  BP(mean): --  RR: 18 (24 Jan 2022 20:08) (18 - 18)  SpO2: 97% (24 Jan 2022 20:08) (93% - 99%)      01-23-22 @ 07:01  -  01-24-22 @ 07:00  --------------------------------------------------------  IN: 0 mL / OUT: 1250 mL / NET: -1250 mL    01-24-22 @ 07:01 - 01-25-22 @ 06:17  --------------------------------------------------------  IN: 0 mL / OUT: 550 mL / NET: -550 mL        PHYSICAL EXAM:  Constitutional: NAD, with restraints  HEENT: NC/AT, PERRLA, EOMI, no conjunctival pallor or scleral icterus, dry MM  Neck: Supple, no JVD  Respiratory: Right lower lobe basilar crackles. No w/r/r, TTP on right axillary region  Cardiovascular: RRR, normal S1 and S2, no m/r/g.   Gastrointestinal: +BS, soft NTND, no guarding or rebound tenderness, no palpable masses   : Shepard in place with clear yellow urine.   Extremities: wwp; no cyanosis, clubbing or edema.   Vascular: Pulses equal and strong throughout.   Neurological: AAOx1, no CN deficits, strength and sensation intact throughout.   Skin: No gross skin abnormalities or rashes      LABS:                        13.1   25.06 )-----------( 217      ( 24 Jan 2022 07:24 )             40.6     01-24    138  |  103  |  31<H>  ----------------------------<  109<H>  3.9   |  21<L>  |  1.55<H>    Ca    9.0      24 Jan 2022 07:24  Phos  3.2     01-24  Mg     2.1     01-24    TPro  6.8  /  Alb  2.6<L>  /  TBili  1.0  /  DBili  x   /  AST  62<H>  /  ALT  34  /  AlkPhos  126<H>  01-24        CAPILLARY BLOOD GLUCOSE              RADIOLOGY & ADDITIONAL TESTS: Reviewed. HOSPITAL COURSE  85 yo M w/ PMH CHF (Unknown EF), HTN, CKD (baseline Cr 1.3), BPH presented from [facility] w/ right-sided pleuritic and back bharat along with generalized weakness/poor PO intake, presented on 1/22/22 and found with sepsis i/s/o COVID PNA, not started on any COVID treatment as patient was not hypoxic. Pt's SBPs in the 80-90s and BPH meds here held. Pt found with RLL infiltrate with WBC 24.5 with neutrophilic predominance. Pt started on vanc and zosyn as MRSA positive. Since then downtrending WBC and inflammatory markers. On night of 1/22/22, patient delirious, bladder scan showed 750cc. Attempted to straight cath but unable. Pt agitated, and given zyprexa 5 mg IM x3. Urology consulted and waite placed, and gradually restarted BPH meds. Waite removed on 1/25 and TOV __.       OVERNIGHT EVENTS: Pt received seroquel 25 mg, melatonin 10 mg.     SUBJECTIVE / INTERVAL HPI: Patient seen and examined at bedside. Pt sleepy/lethargic. Pt denies any F/C, SOB/cough, pain. Limited ROS.     MEDICATIONS  (STANDING):  atorvastatin 40 milliGRAM(s) Oral at bedtime  enoxaparin Injectable 50 milliGRAM(s) SubCutaneous every 24 hours  finasteride 5 milliGRAM(s) Oral daily  lidocaine   4% Patch 1 Patch Transdermal daily  melatonin 10 milliGRAM(s) Oral at bedtime  multivitamin 1 Tablet(s) Oral daily  piperacillin/tazobactam IVPB.. 3.375 Gram(s) IV Intermittent every 6 hours  polyethylene glycol 3350 17 Gram(s) Oral daily  QUEtiapine 25 milliGRAM(s) Oral daily  senna 2 Tablet(s) Oral at bedtime  tamsulosin 0.4 milliGRAM(s) Oral at bedtime    MEDICATIONS  (PRN):  acetaminophen     Tablet .. 650 milliGRAM(s) Oral every 6 hours PRN Mild Pain (1 - 3), Moderate Pain (4 - 6)    Allergies    No Known Allergies    Intolerances        VITAL SIGNS:  Vital Signs Last 24 Hrs  T(C): 37.1 (24 Jan 2022 20:08), Max: 37.1 (24 Jan 2022 20:08)  T(F): 98.8 (24 Jan 2022 20:08), Max: 98.8 (24 Jan 2022 20:08)  HR: 92 (24 Jan 2022 20:08) (92 - 103)  BP: 95/58 (24 Jan 2022 20:08) (95/58 - 116/67)  BP(mean): --  RR: 18 (24 Jan 2022 20:08) (18 - 18)  SpO2: 97% (24 Jan 2022 20:08) (93% - 99%)      01-23-22 @ 07:01  -  01-24-22 @ 07:00  --------------------------------------------------------  IN: 0 mL / OUT: 1250 mL / NET: -1250 mL    01-24-22 @ 07:01  -  01-25-22 @ 06:17  --------------------------------------------------------  IN: 0 mL / OUT: 550 mL / NET: -550 mL        PHYSICAL EXAM:  Constitutional: NAD, with restraints  HEENT: NC/AT, PERRLA, EOMI, no conjunctival pallor or scleral icterus, dry MM  Neck: Supple, no JVD  Respiratory: Right lower lobe basilar crackles. No w/r/r, TTP on right axillary region  Cardiovascular: RRR, normal S1 and S2, no m/r/g.   Gastrointestinal: +BS, soft NTND, no guarding or rebound tenderness, no palpable masses   : Waite in place with clear yellow urine.   Extremities: wwp; no cyanosis, clubbing or edema.   Vascular: Pulses equal and strong throughout.   Neurological: AAOx1, no CN deficits, strength and sensation intact throughout.   Skin: No gross skin abnormalities or rashes      LABS:                        13.1   25.06 )-----------( 217      ( 24 Jan 2022 07:24 )             40.6     01-24    138  |  103  |  31<H>  ----------------------------<  109<H>  3.9   |  21<L>  |  1.55<H>    Ca    9.0      24 Jan 2022 07:24  Phos  3.2     01-24  Mg     2.1     01-24    TPro  6.8  /  Alb  2.6<L>  /  TBili  1.0  /  DBili  x   /  AST  62<H>  /  ALT  34  /  AlkPhos  126<H>  01-24        CAPILLARY BLOOD GLUCOSE              RADIOLOGY & ADDITIONAL TESTS: Reviewed.

## 2022-01-25 NOTE — DISCHARGE NOTE PROVIDER - PROVIDER TOKENS
FREE:[LAST:[Lolis],FIRST:[Yessi],PHONE:[(   )    -],FAX:[(   )    -],ADDRESS:[Please follow up with your primary care doctor Dr. Danielle or the doctor at the [facility]],FOLLOWUP:[2 weeks]],PROVIDER:[TOKEN:[[id number]],FOLLOWUP:[1 week]] PROVIDER:[TOKEN:[[id number]],SCHEDULEDAPPT:[02/17/2022],SCHEDULEDAPPTTIME:[08:30 AM],ESTABLISHEDPATIENT:[T]],FREE:[LAST:[Lolis],FIRST:[Yessi],PHONE:[(   )    -],FAX:[(   )    -],ADDRESS:[Please follow up with your primary care doctor Dr. Danielle or the doctor at the [facility]],FOLLOWUP:[2 weeks]] PROVIDER:[TOKEN:[[id number]],SCHEDULEDAPPT:[02/17/2022],SCHEDULEDAPPTTIME:[08:30 AM],ESTABLISHEDPATIENT:[T]],FREE:[LAST:[Lolis],FIRST:[Yessi],PHONE:[(   )    -],FAX:[(   )    -],ADDRESS:[Please follow up with your primary care doctor Dr. Danielle or the doctor at the [facility]],FOLLOWUP:[2 weeks]],PROVIDER:[TOKEN:[[id number]],FOLLOWUP:[2 weeks]]

## 2022-01-25 NOTE — PROGRESS NOTE ADULT - ASSESSMENT
87 yo M w/ PMH CHF (Unknown EF), HTN, CKD (baseline Cr 1.3), BPH presented from [facility] w/ right-sided CP and back bharat along with generalized weakness/poor PO intake, found with sepsis i/s/o COVID PNA along with RLL HAP - downtrending WBC, as well as mixed MURRAY, course c/b urinary retention, s/p waite placement, course c/b delirium 87 yo M w/ PMH CHF (Unknown EF), HTN, CKD (baseline Cr 1.3), BPH presented from [facility] w/ right-sided CP and back bharat along with generalized weakness/poor PO intake, found with sepsis i/s/o COVID PNA along with RLL CAP - downtrending WBC and inflammatory markers, as well as mixed MURRAY, course c/b urinary retention, s/p waite placement, course c/b delirium

## 2022-01-25 NOTE — PROGRESS NOTE ADULT - PROBLEM SELECTOR PLAN 7
F: None  E: Replete K<4, Mg<2  N: DASH/TLC diet  DVT ppx: Lovenox 50mg subq qd  Code: FULL  Dispo: [facility]

## 2022-01-25 NOTE — DISCHARGE NOTE PROVIDER - NSDCMRMEDTOKEN_GEN_ALL_CORE_FT
finasteride 5 mg oral tablet: 1 tab(s) orally once a day  meclizine 25 mg oral tablet: 0.5 tab(s) orally 3 times a day  Melatonin 10 mg oral capsule: 1 cap(s) orally once a day (at bedtime)  Multiple Vitamins oral tablet: 1 tab(s) orally once a day  oxybutynin 5 mg oral tablet: 1 tab(s) orally once a day  rosuvastatin 10 mg oral tablet: 1 tab(s) orally once a day  tamsulosin 0.4 mg oral capsule: 1 cap(s) orally once a day  Xenazine 12.5 mg oral tablet: 1 tab(s) orally 2 times a day   finasteride 5 mg oral tablet: 1 tab(s) orally once a day  meclizine 12.5 mg oral tablet: 1 tab(s) orally 3 times a day  Melatonin 10 mg oral capsule: 1 cap(s) orally once a day (at bedtime)  Multiple Vitamins oral tablet: 1 tab(s) orally once a day  oxybutynin 5 mg/24 hours oral tablet, extended release: 1 tab(s) orally once a day  rosuvastatin 10 mg oral tablet: 1 tab(s) orally once a day  tamsulosin 0.4 mg oral capsule: 1 cap(s) orally once a day  Xenazine 12.5 mg oral tablet: 1 tab(s) orally 2 times a day   Adult Aspirin Regimen 81 mg oral delayed release tablet: 1 tab(s) orally once a day   finasteride 5 mg oral tablet: 1 tab(s) orally once a day  Melatonin 10 mg oral capsule: 1 cap(s) orally once a day (at bedtime)  Multiple Vitamins oral tablet: 1 tab(s) orally once a day  oxybutynin 5 mg/24 hours oral tablet, extended release: 1 tab(s) orally once a day  polyethylene glycol 3350 oral powder for reconstitution: 17 gram(s) orally 2 times a day  rosuvastatin 10 mg oral tablet: 1 tab(s) orally once a day  senna oral tablet: 2 tab(s) orally once a day (at bedtime)  tamsulosin 0.4 mg oral capsule: 1 cap(s) orally once a day  Xenazine 12.5 mg oral tablet: 1 tab(s) orally 2 times a day   doxycycline monohydrate 100 mg oral capsule: 1 cap(s) orally every 12 hours until Feb 9th.  finasteride 5 mg oral tablet: 1 tab(s) orally once a day  levoFLOXacin 750 mg oral tablet: 1 tab(s) orally every 48 hours from Feb 4th to Feb 9th.  So 1 dose on 2/6 and 1 dose on 2/8  Melatonin 10 mg oral capsule: 1 cap(s) orally once a day (at bedtime)  Multiple Vitamins oral tablet: 1 tab(s) orally once a day  polyethylene glycol 3350 oral powder for reconstitution: 17 gram(s) orally 2 times a day  rosuvastatin 10 mg oral tablet: 1 tab(s) orally once a day  senna oral tablet: 2 tab(s) orally once a day (at bedtime)  tamsulosin 0.4 mg oral capsule: 1 cap(s) orally once a day  Xenazine 12.5 mg oral tablet: 1 tab(s) orally 2 times a day   doxycycline monohydrate 100 mg oral capsule: 1 cap(s) orally every 12 hours until Feb 9th.  enoxaparin: 40 milligram(s) subcutaneous once a day until March 5th.  finasteride 5 mg oral tablet: 1 tab(s) orally once a day  levoFLOXacin 750 mg oral tablet: 1 tab(s) orally every 48 hours from Feb 4th to Feb 9th.  So 1 dose on 2/6 and 1 dose on 2/8  Melatonin 10 mg oral capsule: 1 cap(s) orally once a day (at bedtime)  Multiple Vitamins oral tablet: 1 tab(s) orally once a day  polyethylene glycol 3350 oral powder for reconstitution: 17 gram(s) orally 2 times a day  rosuvastatin 10 mg oral tablet: 1 tab(s) orally once a day  senna oral tablet: 2 tab(s) orally once a day (at bedtime)  tamsulosin 0.4 mg oral capsule: 1 cap(s) orally once a day  Xenazine 12.5 mg oral tablet: 1 tab(s) orally 2 times a day   doxycycline monohydrate 100 mg oral capsule: 1 cap(s) orally every 12 hours until Feb 16th.  enoxaparin: 40 milligram(s) subcutaneous once a day until March 5th.  finasteride 5 mg oral tablet: 1 tab(s) orally once a day  levoFLOXacin 750 mg oral tablet: 1 tab(s) orally every 48 hours from Feb 4th to Feb 16th.  Next dose on 2/6  Melatonin 10 mg oral capsule: 1 cap(s) orally once a day (at bedtime)  Multiple Vitamins oral tablet: 1 tab(s) orally once a day  polyethylene glycol 3350 oral powder for reconstitution: 17 gram(s) orally 2 times a day  rosuvastatin 10 mg oral tablet: 1 tab(s) orally once a day  senna oral tablet: 2 tab(s) orally once a day (at bedtime)  tamsulosin 0.4 mg oral capsule: 1 cap(s) orally once a day  Xenazine 12.5 mg oral tablet: 1 tab(s) orally 2 times a day

## 2022-01-25 NOTE — PHARMACY COMMUNICATION NOTE - COMMENTS
[facility] patient: PNA  Med Rec updated (placed primary pharmacy (not placed at time of looking at med rec); also updated medication Oxybutynin (placed as IR , pt takes ER formulation). Confirmed with outpatient pharmacy that all other medications current and correct

## 2022-01-25 NOTE — DISCHARGE NOTE PROVIDER - HOSPITAL COURSE
#Discharge: do not delete  87 yo M w/ PMH CHF (Unknown EF), HTN, CKD (baseline Cr 1.3), BPH [facility] w/ right-sided CP and back bharat along with generalized weakness/poor PO intake, found with sepsis i/s/o COVID PNA along with RLL CAP - downtrending WBC and inflammatory markers, as well as mixed MURRAY, course c/b urinary retention, s/p waite placement, course c/b delirium     Problem List/Main Diagnoses (system-based):   #Sepsis due to pneumonia  On admission with BP 87/57, WBC 24.53 with neutrophilic predom 87.6%. Lactate 1.0. CXR RLL infiltrate. UA unremarkable. 94% on room air->97% on 2LNC. Right basilar crackles on exam. COVID +, Procal 0.46  MRSA positive  downtrending WBC on 1/24/22  SpO2 95% on RA on 1/24/22  Leukocytosis at 28 - increased from yesterday likely 2/2 hemoconcentration  Pt started with Vancomycin 1g IV and Zosyn 3.375g IV > continue vancomycin and zosyn to cover for bacterial pneumonia given RLL opacity and elevated procal from (1/22/22 - Blood cultures > NGTD.    #2019 novel coronavirus disease (COVID-19)  On admission with BP 87/57, WBC 24.53 with neutrophilic predom 87.6%. Lactate 1.0. CXR RLL infiltrate. UA unremarkable. 94% on room air->97% on 2LNC. Right basilar crackles on exam. COVID +, Procal 0.46  No need for treatment with DEX or REM    #Acute kidney injury superimposed on CKD  On admission with cr 1.59 (baseline cr 1.3) likely in the setting of hypovolemia. Patient with low sBPs 80-90 and reporting decreased PO intake the past few days  most likely prerenal and postrenal  Cr downtrending during his hospital stay.     #CHF (congestive heart failure)  Reported charted history of CHF, EF unknown, pt denies h/o CHF and reports normal ECHO. BNP 2006. No JVD, crackles, or LE edema on exam. Cardiac enzymes negative. EKG with no acute ischemic changes. CXR with RLL infiltrate and does not appear to be overload in nature    #BPH (benign prostatic hyperplasia)  History of BPH   Held home oxybutynin 5mg, consider restarting once BPs normalize  Continued with Finasteride 5 mg qd and tamsulosin 0.4 mg qd    #HLD (hyperlipidemia)  Continued with atorvastatin 40 mg qd    #Delirium  Multifactorial i/s/o sepsis, urinary retention and pain  Continued with seroquel 25 mg at 7 PM to prevent sundowning  Continued with melatonin 10 mg QHS    Inpatient treatment course: Atorvastatin, lovenox, finasteride, lidocaine, melatonin, zosyn, PEG, vancomycin, quetiapine, senna, flomax    New medications: ___    Labs to be followed outpatient: None    Exam to be followed outpatient: Respiratory exam   #Discharge: do not delete  85 yo M w/ PMH CHF (Unknown EF), HTN, CKD (baseline Cr 1.3), BPH presented from [facility] w/ right-sided CP and back bharat along with generalized weakness/poor PO intake, found with sepsis i/s/o COVID PNA along with RLL CAP - downtrending WBC and inflammatory markers, as well as mixed MURRAY, course c/b urinary retention, s/p waite placement, course c/b delirium     Problem List/Main Diagnoses (system-based):   #Sepsis due to pneumonia  On admission with BP 87/57, WBC 24.53 with neutrophilic predom 87.6%. Lactate 1.0. CXR RLL infiltrate. UA unremarkable. 94% on room air->97% on 2LNC. Right basilar crackles on exam. COVID +, Procal 0.46  MRSA positive  Started on vanc/zosyn, patient clinically appeared better on 1/26, vancomycin was DC'd, zosyn 7 day course completed on 1/28  Not requiring O2. CT showing RLL pneumonia. Symptoms improved, mentation better, A&Ox3 on discharge  -f/u CBC on 2/4 to see WBC      #2019 novel coronavirus disease (COVID-19)  On admission with BP 87/57, WBC 24.53 with neutrophilic predom 87.6%. Lactate 1.0. CXR RLL infiltrate. UA unremarkable. 94% on room air->97% on 2LNC. Right basilar crackles on exam. COVID +, Procal 0.46  No need for treatment with DEX or REM    #Acute kidney injury superimposed on CKD  On admission with cr 1.59 (baseline cr 1.3) likely in the setting of hypovolemia. Patient with low sBPs 80-90 and reporting decreased PO intake the past few days  most likely prerenal and postrenal  Cr downtrending during his hospital stay, to 1.4 on discharge    #CHF (congestive heart failure)  Reported charted history of CHF, EF unknown, pt denies h/o CHF and reports normal ECHO. BNP 2006. No JVD, crackles, or LE edema on exam. Cardiac enzymes negative. EKG with no acute ischemic changes. CXR with RLL infiltrate and does not appear to be overload in nature    #BPH (benign prostatic hyperplasia)  History of BPH   Held home oxybutynin 5mg, consider restarting once BPs normalize  Continued with Finasteride 5 mg qd and tamsulosin 0.4 mg qd    #HLD (hyperlipidemia)  Continued with atorvastatin 40 mg qd    #Delirium  Multifactorial i/s/o sepsis, urinary retention and pain  Continued with seroquel 25 mg at 7 PM to prevent sundowning  Continued with melatonin 10 mg QHS        New medications: ___    Labs to be followed outpatient: None    Exam to be followed outpatient: Respiratory exam   #Discharge: do not delete  87 yo M w/ PMH CHF (Unknown EF), HTN, CKD (baseline Cr 1.3), BPH presented from [facility] w/ right-sided CP and back bharat along with generalized weakness/poor PO intake, found with sepsis i/s/o COVID PNA along with RLL CAP - downtrending WBC and inflammatory markers, as well as mixed MURRAY, course c/b urinary retention, s/p waite placement, course c/b delirium     Problem List/Main Diagnoses (system-based):   #Sepsis due to pneumonia  On admission with BP 87/57, WBC 24.53 with neutrophilic predom 87.6%. Lactate 1.0. CXR RLL infiltrate. UA unremarkable. 94% on room air->97% on 2LNC. Right basilar crackles on exam. COVID +, Procal 0.46  MRSA positive  Started on vanc/zosyn, patient clinically appeared better on 1/26, vancomycin was DC'd, zosyn 7 day course completed on 1/28  Not requiring O2. CT showing RLL pneumonia. Symptoms improved, mentation better, A&Ox3 on discharge  -f/u CBC on 2/4 to see WBC    #2019 novel coronavirus disease (COVID-19)  On admission with BP 87/57, WBC 24.53 with neutrophilic predom 87.6%. Lactate 1.0. CXR RLL infiltrate. UA unremarkable. 94% on room air->97% on 2LNC. Right basilar crackles on exam. COVID +, Procal 0.46  No need for treatment with DEX or REM    #Acute kidney injury superimposed on CKD  On admission with cr 1.59 (baseline cr 1.3) likely in the setting of hypovolemia. Patient with low sBPs 80-90 and reporting decreased PO intake the past few days  most likely prerenal and postrenal  Cr downtrending during his hospital stay, to 1.4 on discharge    #CHF (congestive heart failure)  Reported charted history of CHF, EF unknown, pt denies h/o CHF and reports normal ECHO. BNP 2006. No JVD, crackles, or LE edema on exam. Cardiac enzymes negative. EKG with no acute ischemic changes. CXR with RLL infiltrate and does not appear to be overload in nature    #BPH (benign prostatic hyperplasia)  History of BPH   Held home oxybutynin 5mg, consider restarting once BPs normalize  Continued with Finasteride 5 mg qd and tamsulosin 0.4 mg qd    #Urinary Retention  Retained urine during admission despite restarting finasteride and flomax requiring placement of waite catheter  - f/u with urology after discharge to [facility]    #HLD (hyperlipidemia)  Continued with atorvastatin 40 mg qd    #Delirium  Multifactorial i/s/o sepsis, urinary retention and pain  Continued with seroquel 25 mg at 7 PM to prevent sundowning  Continued with melatonin 10 mg QHS    New medications: None  Labs to be followed outpatient: CBC on 2/4 for WBC  Exam to be followed outpatient: Respiratory exam, Trial of Void with Urology   #Discharge: do not delete  85 yo M w/ PMH CHF (Unknown EF), HTN, CKD (baseline Cr 1.3), BPH presented from [facility] w/ right-sided CP and back bharat along with generalized weakness/poor PO intake, found with sepsis i/s/o COVID PNA along with RLL CAP - downtrending WBC and inflammatory markers, as well as mixed MURRAY, course c/b urinary retention, s/p waite placement, course c/b delirium     Problem List/Main Diagnoses (system-based):   #Sepsis due to pneumonia  On admission with BP 87/57, WBC 24.53 with neutrophilic predom 87.6%. Lactate 1.0. CXR RLL infiltrate. UA unremarkable. 94% on room air->97% on 2LNC. Right basilar crackles on exam. COVID +, Procal 0.46  MRSA positive  Started on vanc/zosyn, patient clinically appeared better on 1/26, vancomycin was DC'd, zosyn 7 day course completed on 1/28  Not requiring O2. CT showing RLL pneumonia. Symptoms improved, mentation better, A&Ox3 on discharge  -f/u CBC on 2/4 to see WBC    #2019 novel coronavirus disease (COVID-19)  On admission with BP 87/57, WBC 24.53 with neutrophilic predom 87.6%. Lactate 1.0. CXR RLL infiltrate. UA unremarkable. 94% on room air->97% on 2LNC. Right basilar crackles on exam. COVID +, Procal 0.46  No need for treatment with DEX or REM  -ASA 81 for 30 days    #Acute kidney injury superimposed on CKD  On admission with cr 1.59 (baseline cr 1.3) likely in the setting of hypovolemia. Patient with low sBPs 80-90 and reporting decreased PO intake the past few days  most likely prerenal and postrenal  Cr downtrending during his hospital stay, to 1.4 on discharge    #CHF (congestive heart failure)  Reported charted history of CHF, EF unknown, pt denies h/o CHF and reports normal ECHO. BNP 2006. No JVD, crackles, or LE edema on exam. Cardiac enzymes negative. EKG with no acute ischemic changes. CXR with RLL infiltrate and does not appear to be overload in nature    #BPH (benign prostatic hyperplasia)  History of BPH   Held home oxybutynin 5mg, consider restarting once BPs normalize  Continued with Finasteride 5 mg qd and tamsulosin 0.4 mg qd    #Urinary Retention  Retained urine during admission despite restarting finasteride and flomax requiring placement of waite catheter  - f/u with urology after discharge to [facility]    #HLD (hyperlipidemia)  Continued with atorvastatin 40 mg qd    #Delirium  Multifactorial i/s/o sepsis, urinary retention and pain  Continued with seroquel 25 mg at 7 PM to prevent sundowning  Continued with melatonin 10 mg QHS    New medications: None  Labs to be followed outpatient: CBC on 2/4 for WBC  Exam to be followed outpatient: Respiratory exam, Trial of Void with Urology   #Discharge: do not delete  85 yo M w/ PMH CHF (Unknown EF), HTN, CKD (baseline Cr 1.3), BPH presented from [facility] w/ right-sided CP and back bharat along with generalized weakness/poor PO intake, found with sepsis i/s/o COVID PNA along with RLL CAP - downtrending WBC and inflammatory markers, as well as mixed MURRAY, course c/b urinary retention, s/p waite placement, course c/b delirium     Problem List/Main Diagnoses (system-based):   #Sepsis due to pneumonia  On admission with BP 87/57, WBC 24.53 with neutrophilic predom 87.6%. Lactate 1.0. CXR RLL infiltrate. UA unremarkable. 94% on room air->97% on 2LNC. Right basilar crackles on exam. COVID +, Procal 0.46  MRSA positive  Started on vanc/zosyn, patient clinically appeared better on 1/26, vancomycin was DC'd, zosyn 7 day course completed on 1/28  Not requiring O2. CT showing RLL pneumonia. Symptoms improved, mentation better, A&Ox3 on discharge  -f/u CBC on 2/4 to see WBC    #2019 novel coronavirus disease (COVID-19)  On admission with BP 87/57, WBC 24.53 with neutrophilic predom 87.6%. Lactate 1.0. CXR RLL infiltrate. UA unremarkable. 94% on room air->97% on 2LNC. Right basilar crackles on exam. COVID +, Procal 0.46  No need for treatment with DEX or REM  -ASA 81 for 30 days    #Acute kidney injury superimposed on CKD  On admission with cr 1.59 (baseline cr 1.3) likely in the setting of hypovolemia. Patient with low sBPs 80-90 and reporting decreased PO intake the past few days  most likely prerenal and postrenal  Cr downtrending during his hospital stay, to 1.4 on discharge    #CHF (congestive heart failure)  Reported charted history of CHF, EF unknown, pt denies h/o CHF and reports normal ECHO. BNP 2006. No JVD, crackles, or LE edema on exam. Cardiac enzymes negative. EKG with no acute ischemic changes. CXR with RLL infiltrate and does not appear to be overload in nature    #BPH (benign prostatic hyperplasia)  History of BPH   Held home oxybutynin 5mg, consider restarting once BPs normalize  Continued with Finasteride 5 mg qd and tamsulosin 0.4 mg qd    #Urinary Retention  Retained urine during admission despite restarting finasteride and flomax requiring placement of waite catheter  - f/u with urology after discharge to [facility]    #HLD (hyperlipidemia)  Continued with atorvastatin 40 mg qd    #Delirium  Multifactorial i/s/o sepsis, urinary retention and pain  Continued with seroquel 25 mg at 7 PM to prevent sundowning  Continued with melatonin 10 mg QHS    New medications: ASA 81 for 30 days (Covid)  Labs to be followed outpatient: CBC on 2/4 for WBC  Exam to be followed outpatient: Respiratory exam, Trial of Void with Urology   #Discharge: do not delete  87 yo M w/ PMH CHF (Unknown EF), HTN, CKD (baseline Cr 1.3), BPH presented from [facility] w/ right-sided CP and back bharat along with generalized weakness/poor PO intake, found with sepsis i/s/o COVID PNA along with RLL CAP - downtrending WBC and inflammatory markers, as well as mixed MURRAY, course c/b urinary retention, s/p waite placement, course c/b delirium     Problem List/Main Diagnoses (system-based):   #Sepsis due to pneumonia  On admission with BP 87/57, WBC 24.53 with neutrophilic predom 87.6%. Lactate 1.0. CXR RLL infiltrate. UA unremarkable. 94% on room air->97% on 2LNC. Right basilar crackles on exam. COVID +, Procal 0.46  MRSA positive  Started on vanc/zosyn, patient clinically appeared better on 1/26, vancomycin was DC'd, zosyn 7 day course completed on 1/28. In setting of AMS leading to stroke code, and also the loculated pleural effusion on CT, decision made to restart vanc/zosyn  Not requiring O2. CT showing RLL pneumonia. Symptoms improved, mentation better, A&Ox2-3 on discharge  -f/u CBC on 2/4 to see WBC    #2019 novel coronavirus disease (COVID-19)  On admission with BP 87/57, WBC 24.53 with neutrophilic predom 87.6%. Lactate 1.0. CXR RLL infiltrate. UA unremarkable. 94% on room air->97% on 2LNC. Right basilar crackles on exam. COVID +, Procal 0.46  No need for treatment with DEX or REM  -ASA 81 for 30 days    #Acute kidney injury superimposed on CKD  On admission with cr 1.59 (baseline cr 1.3) likely in the setting of hypovolemia. Patient with low sBPs 80-90 and reporting decreased PO intake the past few days  most likely prerenal and postrenal  Cr downtrending during his hospital stay, to 1.22 on discharge    #CHF (congestive heart failure)  Reported charted history of CHF, EF unknown, pt denies h/o CHF and reports normal ECHO. BNP 2006. No JVD, crackles, or LE edema on exam. Cardiac enzymes negative. EKG with no acute ischemic changes. CXR with RLL infiltrate and does not appear to be overload in nature    #BPH (benign prostatic hyperplasia)  History of BPH   Held home oxybutynin 5mg, consider restarting once BPs normalize  Continued with Finasteride 5 mg qd and tamsulosin 0.4 mg qd    #Urinary Retention  Retained urine during admission despite restarting finasteride and flomax requiring placement of waite catheter  - f/u with urology after discharge to [facility]    #HLD (hyperlipidemia)  Continued with atorvastatin 40 mg qd    #Delirium  Multifactorial i/s/o sepsis, urinary retention and pain  Continued with seroquel 25 mg at 7 PM to prevent sundowning  Continued with melatonin 10 mg QHS    New medications: ASA 81 for 30 days (Covid)  Labs to be followed outpatient: CBC on 2/4 for WBC  Exam to be followed outpatient: Respiratory exam, Trial of Void with Urology   #Discharge: do not delete  85 yo M w/ PMH CHF (Unknown EF), HTN, CKD (baseline Cr 1.3), BPH presented from [facility] w/ right-sided CP and back bharat along with generalized weakness/poor PO intake, found with sepsis i/s/o COVID PNA along with RLL CAP and pleural effusion, course c/b urinary retention, s/p waite placement, and intermittent delirium.     Problem List/Main Diagnoses (system-based):   #Sepsis due to pneumonia  On admission with BP 87/57, WBC 24.53 with neutrophilic predom 87.6%. Lactate 1.0. CXR RLL infiltrate. UA unremarkable. 94% on room air->97% on 2LNC. Right basilar crackles on exam. COVID +, Procal 0.46  MRSA positive  Started on vanc/zosyn, patient clinically appeared better on 1/26, vancomycin was DC'd, zosyn 7 day course completed on 1/28. In setting of AMS leading to stroke code, and also the loculated pleural effusion on CT, decision made to restart vanc/zosyn  Not requiring O2. CT showing RLL pneumonia. Symptoms improved, mentation better, A&Ox2-3 on discharge  -f/u CBC on 2/4 to see WBC    #2019 novel coronavirus disease (COVID-19)  On admission with BP 87/57, WBC 24.53 with neutrophilic predom 87.6%. Lactate 1.0. CXR RLL infiltrate. UA unremarkable. 94% on room air->97% on 2LNC. Right basilar crackles on exam. COVID +, Procal 0.46  No need for treatment with DEX or REM  -ASA 81 for 30 days    #Acute kidney injury superimposed on CKD  On admission with cr 1.59 (baseline cr 1.3) likely in the setting of hypovolemia. Patient with low sBPs 80-90 and reporting decreased PO intake the past few days  most likely prerenal and postrenal  Cr downtrending during his hospital stay, to 1.22 on discharge    #CHF (congestive heart failure)  Reported charted history of CHF, EF unknown, pt denies h/o CHF and reports normal ECHO. BNP 2006. No JVD, crackles, or LE edema on exam. Cardiac enzymes negative. EKG with no acute ischemic changes. CXR with RLL infiltrate and does not appear to be overload in nature    #BPH (benign prostatic hyperplasia)  History of BPH   Held home oxybutynin 5mg, consider restarting once BPs normalize  Continued with Finasteride 5 mg qd and tamsulosin 0.4 mg qd    #Urinary Retention  Retained urine during admission despite restarting finasteride and flomax requiring placement of waite catheter  - f/u with urology after discharge to [facility]    #HLD (hyperlipidemia)  Continued with atorvastatin 40 mg qd    #Delirium  Multifactorial i/s/o sepsis, urinary retention and pain  Continued with seroquel 25 mg at 7 PM to prevent sundowning  Continued with melatonin 10 mg QHS    New medications: ASA 81 for 30 days (Covid)  Labs to be followed outpatient: CBC on 2/4 for WBC  Exam to be followed outpatient: Respiratory exam, Trial of Void with Urology   #Discharge: do not delete    85 yo M w/ PMH CHF (Unknown EF), HTN, CKD (baseline Cr 1.3), BPH presented from [facility] w/ right-sided CP and back bharat along with generalized weakness/poor PO intake, found with sepsis i/s/o COVID PNA along with RLL CAP and pleural effusion, course c/b urinary retention, s/p waite placement, and intermittent delirium.     Problem List/Main Diagnoses (system-based):     #Sepsis due to pneumonia   Pt admitted with leukocytosis, R sided chest pain, CXR RLL infiltrate. Right basilar crackles on exam. On room air -> 2 L. COVID +  MRSA nares positive  On Room Air since 1/26  CRP and ferritin downtrended by 1/27  WBC downtrended to 12 on 2/4  CT Chest: Large right lower lobe consolidation consistent with lobar pneumonia. Small to moderate-sized partially loculated right pleural effusion with pleural thickening suspicious for parapneumonic effusion/empyema.  IR Pigtail placed RLL 2/2 - removed 2/4, post-removal CXR showed improvement of R pleural effusion.  S/p Vanc and zosyn since admission, switched to levaquin/doxy on 2/4    Plan:  - c/w levaquin 750mg q48hrs (for renal adjustment) and doxycycline 100mg BID for 5 days (2/4 - 2/9) - (7 days after source control with Chest tube placement on 2/2)    #2019 novel coronavirus disease (COVID-19)  Covid positive on 1/22, negative on 1/31  Never on more than 2 L Oxygen, on room air since 1/26  Never treated with decadron/remdesivir   CRP and ferritin downtrended by 1/27  WBC downtrended to 12 on 2/4    #Altered Mental Status  Pt found to be unresponsive on 1/28 just prior to planned discharge, gag reflex absent. Stroke code called, stepped up to ICU. CTH negative for stroke. Patient now back to baseline A&Ox2-3  -vEEG: mild generalized slowing without epileptiform activity  -Episode appears transient and patient without new metabolic abnormalities.    #BPH  Urology placed waite catheter on 1/26    Plan:  - hold home oxybutynin 5mg given delirium   - c/w Finasteride 5 mg qd  - c/w tamsulosin 0.4 mg qd  -f/u with urology outpatient.    #Acute kidney injury superimposed on CKD  U/S showing R hydro, likely chronic, could be 2/2 obstruction -> waite in place  Creatinine downtrending -> increased 1.18 -> 1.3 on 2/4    Plan:  -c/w waite   -continue to monitor, avoid nephrotoxins.    #CHF (congestive heart failure)  Reported charted history of CHF, EF unknown, pt denies h/o CHF and reports normal ECHO. BNP 2006. No JVD, crackles, or LE edema on exam. Cardiac enzymes negative. EKG with no acute ischemic changes. CXR with RLL infiltrate and does not appear to be overloaded  TTE done 2/1 - technically difficult study.    #HLD (hyperlipidemia)  -c/w home rosuvastatin 10mg daily    New medications: Levaquin 750mg q48hrs and doxycycline 100mg BID for 5 days (2/4 - 2/9)  Medications that were stopped: Oxybutynin  Labs to be followed outpatient: None  Exam to be followed outpatient: Trial of Void with Urology, f/u with PCP at [facility] for respiratory exam  Physical Exam at time of discharge:  General: Nervous and tremulous. Thin.  HEENT: NC/AT; PERRL, anicteric sclera; MMM  Neck: supple  Cardiovascular: +S1/S2; RRR; high pitched systolic murmur  Respiratory: Difficult to auscultate 2/2 poor inspiratory effort  Gastrointestinal: soft, NT/ND; +BSx4  Extremities: WWP; no edema, clubbing or cyanosis  Vascular: 2+ radial, DP/PT pulses B/L  Neurological: AAOx3; no focal deficits. Odd affect. Hands jerking occasionally.   Waite in place. #Discharge: do not delete    85 yo M w/ PMH CHF (Unknown EF), HTN, CKD (baseline Cr 1.3), BPH [facility] w/ right-sided CP and back bharat along with generalized weakness/poor PO intake, found with sepsis i/s/o COVID PNA along with RLL CAP and pleural effusion, course c/b urinary retention, s/p waite placement, and intermittent delirium.     Problem List/Main Diagnoses (system-based):   #Sepsis due to pneumonia  Pt admitted with leukocytosis, R sided chest pain, CXR RLL infiltrate. Right basilar crackles on exam. On Room Air since 1/26  MRSA nares positive  CRP and ferritin downtrended by 1/27  WBC downtrended to 12 on 2/4  CT Chest: Large right lower lobe consolidation consistent with lobar pneumonia. Small to moderate-sized partially loculated right pleural effusion with pleural thickening suspicious for parapneumonic effusion/empyema.  IR Pigtail placed RLL 2/2 - removed 2/4, post-removal CXR showed improvement of R pleural effusion.  S/p Vanc and zosyn since admission, switched to levaquin/doxycycline on 2/4    Plan:  - c/w levaquin 750mg q48hrs (for renal adjustment) and doxycycline 100mg BID for 5 days (2/4 - 2/9) - (7 days after source control with Chest tube placement on 2/2)    #2019 novel coronavirus disease (COVID-19)  Covid positive on 1/22, negative on 1/31  Never on more than 2 L Oxygen, on room air since 1/26  Never treated with decadron/remdesivir   CRP and ferritin downtrended by 1/27  WBC downtrended to 12 on 2/4  - Lovenox for DVT PPx 40mg sq daily for 30 days    #Altered Mental Status  Pt found to be unresponsive on 1/28 just prior to planned discharge, gag reflex absent. Stroke code called, stepped up to ICU. CTH negative for stroke. Patient now back to baseline A&Ox2-3  -vEEG: mild generalized slowing without epileptiform activity  -Episode appears transient and patient without new metabolic abnormalities.    #BPH/Urinary Retention  Urology placed waite catheter on 1/26    Plan:  - hold home oxybutynin 5mg given delirium   - c/w Finasteride 5 mg qd  - c/w tamsulosin 0.4 mg qd  - f/u with urology outpatient.    #Acute kidney injury superimposed on CKD  U/S showing R hydro, likely chronic, could be 2/2 obstruction -> waite in place  Creatinine downtrending -> increased 1.18 -> 1.3 on 2/4    Plan:  -c/w waite   -continue to monitor, avoid nephrotoxins.    #CHF (congestive heart failure)  Reported charted history of CHF, EF unknown, pt denies h/o CHF and reports normal ECHO. BNP 2006. No JVD, crackles, or LE edema on exam. Cardiac enzymes negative. EKG with no acute ischemic changes. CXR with RLL infiltrate and does not appear to be overloaded  TTE done 2/1 - technically difficult study.    #HLD (hyperlipidemia)  -c/w home rosuvastatin 10mg daily    New medications: Levaquin 750mg q48hrs and doxycycline 100mg BID for 5 days (2/4 - 2/9)  Medications that were stopped: Oxybutynin  Labs to be followed outpatient: None  Exam to be followed outpatient: Trial of Void with Urology, f/u with PCP at [facility] for respiratory exam  Physical Exam at time of discharge:  General: Nervous and tremulous. Thin.  HEENT: NC/AT; PERRL, anicteric sclera; MMM  Neck: supple  Cardiovascular: +S1/S2; RRR; high pitched systolic murmur  Respiratory: Difficult to auscultate 2/2 poor inspiratory effort  Gastrointestinal: soft, NT/ND; +BSx4  : Waite in place.  Extremities: WWP; no edema, clubbing or cyanosis  Vascular: 2+ radial, DP/PT pulses B/L  Neurological: AAOx3; no focal deficits. Odd affect. Hands jerking occasionally. #Discharge: do not delete    85 yo M w/ PMH CHF (Unknown EF), HTN, CKD (baseline Cr 1.3), BPH presented [facility] w/ right-sided CP and back bharat along with generalized weakness/poor PO intake, found with sepsis i/s/o COVID PNA along with RLL CAP and pleural effusion, course c/b urinary retention, s/p waite placement, and intermittent delirium.     Problem List/Main Diagnoses (system-based):   #Sepsis due to pneumonia  Pt admitted with leukocytosis, R sided chest pain, CXR RLL infiltrate. Right basilar crackles on exam. On Room Air since 1/26  MRSA nares positive  CRP and ferritin downtrended by 1/27  WBC downtrended to 12 on 2/4  CT Chest: Large right lower lobe consolidation consistent with lobar pneumonia. Small to moderate-sized partially loculated right pleural effusion with pleural thickening suspicious for parapneumonic effusion/empyema.  IR Pigtail placed RLL 2/2 - removed 2/4, post-removal CXR showed improvement of R pleural effusion.  S/p Vanc and zosyn since admission, switched to levaquin/doxycycline on 2/4    Plan:  - c/w levaquin 750mg q48hrs (for renal adjustment) and doxycycline 100mg BID for 5 days (2/4 - 2/16) - (14 days after source control with Chest tube placement on 2/2)    #2019 novel coronavirus disease (COVID-19)  Covid positive on 1/22, negative on 1/31  Never on more than 2 L Oxygen, on room air since 1/26  Never treated with decadron/remdesivir   CRP and ferritin downtrended by 1/27  WBC downtrended to 12 on 2/4  - Lovenox for DVT PPx 40mg sq daily for 30 days    #Altered Mental Status  Pt found to be unresponsive on 1/28 just prior to planned discharge, gag reflex absent. Stroke code called, stepped up to ICU. CTH negative for stroke. Patient now back to baseline A&Ox2-3  -vEEG: mild generalized slowing without epileptiform activity  -Episode appears transient and patient without new metabolic abnormalities.    #BPH/Urinary Retention  Urology placed waite catheter on 1/26    Plan:  - hold home oxybutynin 5mg given delirium   - c/w Finasteride 5 mg qd  - c/w tamsulosin 0.4 mg qd  - f/u with urology outpatient.    #Acute kidney injury superimposed on CKD  U/S showing R hydro, likely chronic, could be 2/2 obstruction -> waite in place  Creatinine downtrending -> increased 1.18 -> 1.3 on 2/4    Plan:  -c/w waite   -continue to monitor, avoid nephrotoxins.    #CHF (congestive heart failure)  Reported charted history of CHF, EF unknown, pt denies h/o CHF and reports normal ECHO. BNP 2006. No JVD, crackles, or LE edema on exam. Cardiac enzymes negative. EKG with no acute ischemic changes. CXR with RLL infiltrate and does not appear to be overloaded  TTE done 2/1 - technically difficult study.    #HLD (hyperlipidemia)  -c/w home rosuvastatin 10mg daily    New medications: Levaquin 750mg q48hrs and doxycycline 100mg BID for total of 14 days (2/4 - 2/16)  Medications that were stopped: Oxybutynin  Labs to be followed outpatient: None  Exam to be followed outpatient: Trial of Void with Urology, f/u with PCP at [facility] for respiratory exam  Physical Exam at time of discharge:  General: Nervous and tremulous. Thin.  HEENT: NC/AT; PERRL, anicteric sclera; MMM  Neck: supple  Cardiovascular: +S1/S2; RRR; high pitched systolic murmur  Respiratory: Difficult to auscultate 2/2 poor inspiratory effort  Gastrointestinal: soft, NT/ND; +BSx4  : Waite in place.  Extremities: WWP; no edema, clubbing or cyanosis  Vascular: 2+ radial, DP/PT pulses B/L  Neurological: AAOx3; no focal deficits. Odd affect. Hands jerking occasionally.

## 2022-01-25 NOTE — PROGRESS NOTE ADULT - PROBLEM SELECTOR PLAN 5
History of BPH     - hold home oxybutynin 5mg, consider restarting once BPs normalize  - c/w Finasteride 5 mg qd  - c/w tamsulosin 0.4 mg qd    #Urinary retention  *read chart note for full details*    - consider restarting BPH meds if blood pressure goes up  - c/w waite History of BPH     - hold home oxybutynin 5mg, consider restarting once BPs normalize  - c/w Finasteride 5 mg qd  - c/w tamsulosin 0.4 mg qd    #Urinary retention  *read chart note for full details*    - d/c waite in 1/25 and f/u T[facility]  - bladder scan/straight cath Q6H

## 2022-01-25 NOTE — PROGRESS NOTE ADULT - PROBLEM SELECTOR PLAN 3
on admission with cr 1.59 (baseline cr 1.3) likely in the setting of hypovolemia. Patient with low sBPs 80-90 and reporting decreased PO intake the past few days  most likely prerenal and postrenal    - encourage PO intake  - continue to monitor > worsening Cr on 1/23, given 500cc bolus NS  - Consider urine lytes if no improvement in Cr on 1/24 > cr downtrending on 1/24

## 2022-01-25 NOTE — DISCHARGE NOTE PROVIDER - CARE PROVIDER_API CALL
Yessi Danielle  Please follow up with your primary care doctor Dr. Danielle or the doctor at the [facility]  Phone: (   )    -  Fax: (   )    -  Follow Up Time: 2 weeks    Juanita Jenkins  Urology  [address] [place] [address] [place] [address]  Phone: [phone]  Fax: [phone]  Follow Up Time: 1 week   Juanita Jenkins  Urology  [address], [place] [address]  Phone: [phone]  Fax: [phone]  Established Patient  Scheduled Appointment: 02/17/2022 08:30 AM    Yessi Danielle  Please follow up with your primary care doctor Dr. Danielle or the doctor at the [facility]  Phone: (   )    -  Fax: (   )    -  Follow Up Time: 2 weeks   Juanita Jenkins  Urology  [address] [place] [address]  Phone: [phone]  Fax: [phone]  Established Patient  Scheduled Appointment: 02/17/2022 08:30 AM    Yessi Danielle  Please follow up with your primary care doctor Dr. Danielle or the doctor at the [facility]  Phone: (   )    -  Fax: (   )    -  Follow Up Time: 2 weeks    Minoo Ponce)  Internal Medicine; Pulmonary Disease  [address] [address]  Phone: [phone]  Fax: [phone]  Follow Up Time: 2 weeks

## 2022-01-25 NOTE — PROGRESS NOTE ADULT - ATTENDING COMMENTS
cont Rx for sepsis due to Pneumonia and monitor Hypoxia  recheck BP with small cuff   f/u wbc and monitor for sepsis  Rt arm swelling +: US to r/o dvt  Monitor renal functions  BMI 17  malnutrition

## 2022-01-25 NOTE — DISCHARGE NOTE PROVIDER - NSDCCPCAREPLAN_GEN_ALL_CORE_FT
PRINCIPAL DISCHARGE DIAGNOSIS  Diagnosis: Pneumonia  Assessment and Plan of Treatment: Pneumonia is an infection that inflames air sacs in one or both lungs, which may fill with fluid. With pneumonia, the air sacs may fill with fluid or pus. The infection can be life-threatening to infants, children, and people over 65. Symptoms include cough with phlegm or pus, fever, chills, and difficulty breathing. Antibiotics can treat many forms of pneumonia. Some forms of pneumonia can be prevented by vaccines.        SECONDARY DISCHARGE DIAGNOSES  Diagnosis: 2019 novel coronavirus disease (COVID-19)  Assessment and Plan of Treatment: COVID-19 is the disease caused by a coronavirus first discovered in December 2019. Coronaviruses generally cause upper respiratory (nose, throat, and lung) infections, such as a cold. The 2019 virus spreads quickly and easily. It can be spread starting 2 to 3 days before symptoms even begin.  DISCHARGE INSTRUCTIONS:  Call your local emergency number (911 in the ) if:  - You have trouble breathing or shortness of breath at rest.  - You have chest pain or pressure that lasts longer than 5 minutes.  - You become confused or hard to wake.  - Your lips or face are blue.  Seek care immediately if:  - You have a fever of 104°F (40°C) or higher.  Medicines you may need:  - Decongestants help reduce nasal congestion and help you breathe more easily. If you take decongestant pills, they may make you feel restless or cause problems with your sleep. Do not use decongestant sprays for more than a few days.  - Cough suppressants help reduce coughing. Ask your healthcare provider which type of cough medicine is best for you.  - Acetaminophen decreases pain and fever. It is available without a doctor's order. Ask how much to take and how often to take it. Follow directions. Read the labels of all other medicines you are using to see if they also contain acetaminophen, or ask your doctor or pharmacist. Acetaminophen can cause liver damage if not taken correctly. Do not use more than 4 grams (4,000 milligrams) total of acetaminophen in one day.  - NSAIDs, such as ibuprofen, help decrease swelling, pain, and fever. This medicine is available with or without a doctor's order. NSAIDs can cause stomach bleeding or kidney problems in certain people. If you take blood thinner medicine, always ask your healthcare provider if NSAIDs are safe for you. Always read the medicine label and follow directions.       PRINCIPAL DISCHARGE DIAGNOSIS  Diagnosis: Pneumonia  Assessment and Plan of Treatment: Pneumonia is an infection that inflames air sacs in one or both lungs, which may fill with fluid. With pneumonia, the air sacs may fill with fluid or pus. The infection can be life-threatening to infants, children, and people over 65. Symptoms include cough with phlegm or pus, fever, chills, and difficulty breathing. Antibiotics can treat many forms of pneumonia. Some forms of pneumonia can be prevented by vaccines.        SECONDARY DISCHARGE DIAGNOSES  Diagnosis: 2019 novel coronavirus disease (COVID-19)  Assessment and Plan of Treatment: COVID-19 is the disease caused by a coronavirus first discovered in December 2019. Coronaviruses generally cause upper respiratory (nose, throat, and lung) infections, such as a cold. The 2019 virus spreads quickly and easily. It can be spread starting 2 to 3 days before symptoms even begin.  DISCHARGE INSTRUCTIONS:  Call your local emergency number (911 in the ) if:  - You have trouble breathing or shortness of breath at rest.  - You have chest pain or pressure that lasts longer than 5 minutes.  - You become confused or hard to wake.  - Your lips or face are blue.  Seek care immediately if:  - You have a fever of 104°F (40°C) or higher.  Medicines you may need:  - Decongestants help reduce nasal congestion and help you breathe more easily. If you take decongestant pills, they may make you feel restless or cause problems with your sleep. Do not use decongestant sprays for more than a few days.  - Cough suppressants help reduce coughing. Ask your healthcare provider which type of cough medicine is best for you.  - Acetaminophen decreases pain and fever. It is available without a doctor's order. Ask how much to take and how often to take it. Follow directions. Read the labels of all other medicines you are using to see if they also contain acetaminophen, or ask your doctor or pharmacist. Acetaminophen can cause liver damage if not taken correctly. Do not use more than 4 grams (4,000 milligrams) total of acetaminophen in one day.  - NSAIDs, such as ibuprofen, help decrease swelling, pain, and fever. This medicine is available with or without a doctor's order. NSAIDs can cause stomach bleeding or kidney problems in certain people. If you take blood thinner medicine, always ask your healthcare provider if NSAIDs are safe for you. Always read the medicine label and follow directions.      Diagnosis: Urinary retention  Assessment and Plan of Treatment: During your hospitalization you were found to be retaining urine. Despite restarting your home BPH medications, you were still retaining urine and this caused your kidney function to worsen. We trialed you off of a cathether but you continued to retain urine. A waite catheter was placed by the urology team and your retention improved. You should follow up with a urologist after discharge to evaluate why you were retaining urine and to trial off of a catheter.     PRINCIPAL DISCHARGE DIAGNOSIS  Diagnosis: Pneumonia  Assessment and Plan of Treatment: Pneumonia is an infection that inflames air sacs in one or both lungs, which may fill with fluid. With pneumonia, the air sacs may fill with fluid or pus. The infection can be life-threatening to infants, children, and people over 65. Symptoms include cough with phlegm or pus, fever, chills, and difficulty breathing. Antibiotics can treat many forms of pneumonia. Some forms of pneumonia can be prevented by vaccines.  Please take aspirin 81 mg for the next 30 days        SECONDARY DISCHARGE DIAGNOSES  Diagnosis: 2019 novel coronavirus disease (COVID-19)  Assessment and Plan of Treatment: COVID-19 is the disease caused by a coronavirus first discovered in December 2019. Coronaviruses generally cause upper respiratory (nose, throat, and lung) infections, such as a cold. The 2019 virus spreads quickly and easily. It can be spread starting 2 to 3 days before symptoms even begin.  DISCHARGE INSTRUCTIONS:  Call your local emergency number (911 in the ) if:  - You have trouble breathing or shortness of breath at rest.  - You have chest pain or pressure that lasts longer than 5 minutes.  - You become confused or hard to wake.  - Your lips or face are blue.  Seek care immediately if:  - You have a fever of 104°F (40°C) or higher.  Medicines you may need:  - Decongestants help reduce nasal congestion and help you breathe more easily. If you take decongestant pills, they may make you feel restless or cause problems with your sleep. Do not use decongestant sprays for more than a few days.  - Cough suppressants help reduce coughing. Ask your healthcare provider which type of cough medicine is best for you.  - Acetaminophen decreases pain and fever. It is available without a doctor's order. Ask how much to take and how often to take it. Follow directions. Read the labels of all other medicines you are using to see if they also contain acetaminophen, or ask your doctor or pharmacist. Acetaminophen can cause liver damage if not taken correctly. Do not use more than 4 grams (4,000 milligrams) total of acetaminophen in one day.  - NSAIDs, such as ibuprofen, help decrease swelling, pain, and fever. This medicine is available with or without a doctor's order. NSAIDs can cause stomach bleeding or kidney problems in certain people. If you take blood thinner medicine, always ask your healthcare provider if NSAIDs are safe for you. Always read the medicine label and follow directions.      Diagnosis: Urinary retention  Assessment and Plan of Treatment: During your hospitalization you were found to be retaining urine. Despite restarting your home BPH medications, you were still retaining urine and this caused your kidney function to worsen. We trialed you off of a cathether but you continued to retain urine. A waite catheter was placed by the urology team and your retention improved. You should follow up with a urologist after discharge to evaluate why you were retaining urine and to trial off of a catheter.     PRINCIPAL DISCHARGE DIAGNOSIS  Diagnosis: Pneumonia  Assessment and Plan of Treatment: Pneumonia is an infection that inflames air sacs in one or both lungs, which may fill with fluid. With pneumonia, the air sacs may fill with fluid or pus. The infection can be life-threatening to infants, children, and people over 65. Symptoms include cough with phlegm or pus, fever, chills, and difficulty breathing.   We found you to be positive for the COVID-19 virus, but we also suspect you had an overlying bacterial infection. You tested positive for covid on 1/22 and negative on 1/31 and no longer need to isolate.  We treated you with antibiotics and a chest tube to drain fluid that collected in the space around your right lung.  Please continue to take: LEVAQUIN 750mg every 48hrs and DOXYCYCLINE 100mg every 12 hours until Februrary 9th.        SECONDARY DISCHARGE DIAGNOSES  Diagnosis: Urinary retention  Assessment and Plan of Treatment: During your hospitalization you were found to be retaining urine. Despite restarting your home BPH medications, you were still retaining urine and this caused your kidney function to worsen. We trialed you off of a cathether but you continued to retain urine. A waite catheter was placed by the urology team and your retention improved. You should follow up with the urologist at the appointment made for you to evaluate why you were retaining urine and to trial off of a catheter.   Please continue taking tamsulosin and finasteride, but stop taking oxybutynin as this may have been causing delirium and urinary retention. Please discuss whether to continue oxybutynin with your urologist.    Diagnosis: Altered mental status  Assessment and Plan of Treatment: We found you to have altered mental status intermittently while you were here. This was likely due to the pneumonia. You mental status was back to its baseline prior to discharge, but please work with your primary care provider at [facility] to continue to optimize your mental health.   We held your Xenazine while you were here, but please consider restarting after discussion with your doctor.     PRINCIPAL DISCHARGE DIAGNOSIS  Diagnosis: Pneumonia  Assessment and Plan of Treatment: Pneumonia is an infection that inflames air sacs in one or both lungs, which may fill with fluid. With pneumonia, the air sacs may fill with fluid or pus. The infection can be life-threatening to infants, children, and people over 65. Symptoms include cough with phlegm or pus, fever, chills, and difficulty breathing.   We found you to be positive for the COVID-19 virus, but we also suspect you had an overlying bacterial infection. You tested positive for covid on 1/22 and negative on 1/31 and no longer need to isolate.  We treated you with antibiotics and a chest tube to drain fluid that collected in the space around your right lung.  Please continue to take: LEVAQUIN 750mg every 48hrs and DOXYCYCLINE 100mg every 12 hours until Februrary 9th.        SECONDARY DISCHARGE DIAGNOSES  Diagnosis: Urinary retention  Assessment and Plan of Treatment: During your hospitalization you were found to be retaining urine. Despite restarting your home BPH medications, you were still retaining urine and this caused your kidney function to worsen. We trialed you off of a cathether but you continued to retain urine. A waite catheter was placed by the urology team and your retention improved. You should follow up with the urologist at the appointment made for you to evaluate why you were retaining urine and to trial off of a catheter.   Please continue taking tamsulosin and finasteride, but stop taking oxybutynin as this may have been causing delirium and urinary retention. Please discuss whether to continue oxybutynin with your urologist. Your doctors at rehab may attempt another trial of void at the facility. If you do not pass the trial of void, you will need to follow up with the urology team at your appointment on 2/17.    Diagnosis: Altered mental status  Assessment and Plan of Treatment: We found you to have altered mental status intermittently while you were here. This was likely due to the pneumonia. You mental status was back to its baseline prior to discharge, but please work with your primary care provider at [facility] to continue to optimize your mental health.   We held your Xenazine while you were here, but please consider restarting after discussion with your doctor.     PRINCIPAL DISCHARGE DIAGNOSIS  Diagnosis: Pneumonia  Assessment and Plan of Treatment: Pneumonia is an infection that inflames air sacs in one or both lungs, which may fill with fluid. With pneumonia, the air sacs may fill with fluid or pus. The infection can be life-threatening to infants, children, and people over 65. Symptoms include cough with phlegm or pus, fever, chills, and difficulty breathing.   We found you to be positive for the COVID-19 virus, but we also suspect you had an overlying bacterial infection. You tested positive for covid on 1/22 and negative on 1/31 and no longer need to isolate.  We treated you with antibiotics and a chest tube to drain fluid that collected in the space around your right lung.  Please continue to take: LEVAQUIN 750mg every 48hrs and DOXYCYCLINE 100mg every 12 hours until Februrary 9th.  Please call the pulmonary doctors office provided to schedule an appointment for 2 weeks.        SECONDARY DISCHARGE DIAGNOSES  Diagnosis: Urinary retention  Assessment and Plan of Treatment: During your hospitalization you were found to be retaining urine. Despite restarting your home BPH medications, you were still retaining urine and this caused your kidney function to worsen. We trialed you off of a cathether but you continued to retain urine. A waite catheter was placed by the urology team and your retention improved. You should follow up with the urologist at the appointment made for you to evaluate why you were retaining urine and to trial off of a catheter.   Please continue taking tamsulosin and finasteride, but stop taking oxybutynin as this may have been causing delirium and urinary retention. Please discuss whether to continue oxybutynin with your urologist. Your doctors at rehab may attempt another trial of void at the facility. If you do not pass the trial of void, you will need to follow up with the urology team at your appointment on 2/17.    Diagnosis: Altered mental status  Assessment and Plan of Treatment: We found you to have altered mental status intermittently while you were here. This was likely due to the pneumonia. You mental status was back to its baseline prior to discharge, but please work with your primary care provider at [facility] to continue to optimize your mental health.   We held your Xenazine while you were here, but please consider restarting after discussion with your doctor.

## 2022-01-25 NOTE — PROGRESS NOTE ADULT - PROBLEM SELECTOR PLAN 1
on admission with BP 87/57, WBC 24.53 with neutrophilic predom 87.6%. Lactate 1.0. CXR RLL infiltrate. UA unremarkable. 94% on room air->97% on 2LNC. Right basilar crackles on exam. COVID +, Procal 0.46  MRSA positive  downtrending WBC on 1/24/22  SpO2 95% on RA on 1/24/22  Leukocytosis at 28 - increased from yesterday likely 2/2 hemoconcentration    - c/w Vancomycin 1g IV and Zosyn 3.375g IV > continue vancomycin and zosyn to cover for bacterial pneumonia given RLL opacity and elevated procal   - f/u blood cultures > NGTD on admission with BP 87/57, WBC 24.53 with neutrophilic predom 87.6%. Lactate 1.0. CXR RLL infiltrate. UA unremarkable. 94% on room air->97% on 2LNC. Right basilar crackles on exam. COVID +, Procal 0.46  MRSA positive  downtrending WBC on 1/24/22  SpO2 95% on RA on 1/24/22  Leukocytosis at 28 - increased from yesterday likely 2/2 hemoconcentration    - c/w Vancomycin 1g IV and Zosyn 3.375g IV > continue vancomycin and zosyn to cover for bacterial pneumonia given RLL opacity and elevated procal from (1/22/22 - )  - f/u blood cultures > NGTD

## 2022-01-25 NOTE — DISCHARGE NOTE PROVIDER - DETAILS OF MALNUTRITION DIAGNOSIS/DIAGNOSES
This patient has been assessed with a concern for Malnutrition and was treated during this hospitalization for the following Nutrition diagnosis/diagnoses:     -  01/23/2022: Severe protein-calorie malnutrition

## 2022-01-25 NOTE — DISCHARGE NOTE PROVIDER - CARE PROVIDERS DIRECT ADDRESSES
,DirectAddress_Unknown,[email or web address],DirectAddress_Unknown ,[email or web address],DirectAddress_Unknown,DirectAddress_Unknown

## 2022-01-26 LAB
ALBUMIN SERPL ELPH-MCNC: 2.5 G/DL — LOW (ref 3.3–5)
ALP SERPL-CCNC: 120 U/L — SIGNIFICANT CHANGE UP (ref 40–120)
ALT FLD-CCNC: 25 U/L — SIGNIFICANT CHANGE UP (ref 10–45)
ANION GAP SERPL CALC-SCNC: 11 MMOL/L — SIGNIFICANT CHANGE UP (ref 5–17)
AST SERPL-CCNC: 35 U/L — SIGNIFICANT CHANGE UP (ref 10–40)
BASOPHILS # BLD AUTO: 0.05 K/UL — SIGNIFICANT CHANGE UP (ref 0–0.2)
BASOPHILS NFR BLD AUTO: 0.3 % — SIGNIFICANT CHANGE UP (ref 0–2)
BILIRUB SERPL-MCNC: 0.8 MG/DL — SIGNIFICANT CHANGE UP (ref 0.2–1.2)
BUN SERPL-MCNC: 31 MG/DL — HIGH (ref 7–23)
CALCIUM SERPL-MCNC: 9.2 MG/DL — SIGNIFICANT CHANGE UP (ref 8.4–10.5)
CHLORIDE SERPL-SCNC: 102 MMOL/L — SIGNIFICANT CHANGE UP (ref 96–108)
CO2 SERPL-SCNC: 25 MMOL/L — SIGNIFICANT CHANGE UP (ref 22–31)
CREAT SERPL-MCNC: 1.72 MG/DL — HIGH (ref 0.5–1.3)
CRP SERPL-MCNC: 226.6 MG/L — HIGH (ref 0–4)
D DIMER BLD IA.RAPID-MCNC: 1938 NG/ML DDU — HIGH
EOSINOPHIL # BLD AUTO: 0 K/UL — SIGNIFICANT CHANGE UP (ref 0–0.5)
EOSINOPHIL NFR BLD AUTO: 0 % — SIGNIFICANT CHANGE UP (ref 0–6)
FERRITIN SERPL-MCNC: 1694 NG/ML — HIGH (ref 30–400)
GLUCOSE SERPL-MCNC: 130 MG/DL — HIGH (ref 70–99)
HCT VFR BLD CALC: 37.1 % — LOW (ref 39–50)
HGB BLD-MCNC: 11.9 G/DL — LOW (ref 13–17)
IMM GRANULOCYTES NFR BLD AUTO: 1.9 % — HIGH (ref 0–1.5)
LYMPHOCYTES # BLD AUTO: 1.22 K/UL — SIGNIFICANT CHANGE UP (ref 1–3.3)
LYMPHOCYTES # BLD AUTO: 6.8 % — LOW (ref 13–44)
MCHC RBC-ENTMCNC: 31.4 PG — SIGNIFICANT CHANGE UP (ref 27–34)
MCHC RBC-ENTMCNC: 32.1 GM/DL — SIGNIFICANT CHANGE UP (ref 32–36)
MCV RBC AUTO: 97.9 FL — SIGNIFICANT CHANGE UP (ref 80–100)
MONOCYTES # BLD AUTO: 1.29 K/UL — HIGH (ref 0–0.9)
MONOCYTES NFR BLD AUTO: 7.2 % — SIGNIFICANT CHANGE UP (ref 2–14)
NEUTROPHILS # BLD AUTO: 15.03 K/UL — HIGH (ref 1.8–7.4)
NEUTROPHILS NFR BLD AUTO: 83.8 % — HIGH (ref 43–77)
NRBC # BLD: 0 /100 WBCS — SIGNIFICANT CHANGE UP (ref 0–0)
PLATELET # BLD AUTO: 241 K/UL — SIGNIFICANT CHANGE UP (ref 150–400)
POTASSIUM SERPL-MCNC: 3.4 MMOL/L — LOW (ref 3.5–5.3)
POTASSIUM SERPL-SCNC: 3.4 MMOL/L — LOW (ref 3.5–5.3)
PROT SERPL-MCNC: 6.6 G/DL — SIGNIFICANT CHANGE UP (ref 6–8.3)
RBC # BLD: 3.79 M/UL — LOW (ref 4.2–5.8)
RBC # FLD: 14.7 % — HIGH (ref 10.3–14.5)
SODIUM SERPL-SCNC: 138 MMOL/L — SIGNIFICANT CHANGE UP (ref 135–145)
WBC # BLD: 17.93 K/UL — HIGH (ref 3.8–10.5)
WBC # FLD AUTO: 17.93 K/UL — HIGH (ref 3.8–10.5)

## 2022-01-26 PROCEDURE — 51701 INSERT BLADDER CATHETER: CPT

## 2022-01-26 PROCEDURE — 99233 SBSQ HOSP IP/OBS HIGH 50: CPT | Mod: GC

## 2022-01-26 PROCEDURE — 93971 EXTREMITY STUDY: CPT | Mod: 26,RT

## 2022-01-26 PROCEDURE — 76770 US EXAM ABDO BACK WALL COMP: CPT | Mod: 26

## 2022-01-26 RX ORDER — OLANZAPINE 15 MG/1
2.5 TABLET, FILM COATED ORAL ONCE
Refills: 0 | Status: DISCONTINUED | OUTPATIENT
Start: 2022-01-26 | End: 2022-01-26

## 2022-01-26 RX ORDER — ENOXAPARIN SODIUM 100 MG/ML
25 INJECTION SUBCUTANEOUS EVERY 12 HOURS
Refills: 0 | Status: DISCONTINUED | OUTPATIENT
Start: 2022-01-27 | End: 2022-01-28

## 2022-01-26 RX ORDER — OLANZAPINE 15 MG/1
5 TABLET, FILM COATED ORAL ONCE
Refills: 0 | Status: COMPLETED | OUTPATIENT
Start: 2022-01-26 | End: 2022-01-26

## 2022-01-26 RX ORDER — POTASSIUM CHLORIDE 20 MEQ
40 PACKET (EA) ORAL ONCE
Refills: 0 | Status: COMPLETED | OUTPATIENT
Start: 2022-01-26 | End: 2022-01-26

## 2022-01-26 RX ORDER — POTASSIUM CHLORIDE 20 MEQ
20 PACKET (EA) ORAL THREE TIMES A DAY
Refills: 0 | Status: DISCONTINUED | OUTPATIENT
Start: 2022-01-26 | End: 2022-01-26

## 2022-01-26 RX ORDER — SODIUM CHLORIDE 9 MG/ML
500 INJECTION, SOLUTION INTRAVENOUS ONCE
Refills: 0 | Status: COMPLETED | OUTPATIENT
Start: 2022-01-26 | End: 2022-01-26

## 2022-01-26 RX ORDER — OLANZAPINE 15 MG/1
2.5 TABLET, FILM COATED ORAL ONCE
Refills: 0 | Status: COMPLETED | OUTPATIENT
Start: 2022-01-26 | End: 2022-01-26

## 2022-01-26 RX ORDER — POLYETHYLENE GLYCOL 3350 17 G/17G
17 POWDER, FOR SOLUTION ORAL
Refills: 0 | Status: DISCONTINUED | OUTPATIENT
Start: 2022-01-26 | End: 2022-02-05

## 2022-01-26 RX ADMIN — PIPERACILLIN AND TAZOBACTAM 200 GRAM(S): 4; .5 INJECTION, POWDER, LYOPHILIZED, FOR SOLUTION INTRAVENOUS at 06:02

## 2022-01-26 RX ADMIN — SODIUM CHLORIDE 3000 MILLILITER(S): 9 INJECTION, SOLUTION INTRAVENOUS at 10:49

## 2022-01-26 RX ADMIN — LIDOCAINE 1 PATCH: 4 CREAM TOPICAL at 12:46

## 2022-01-26 RX ADMIN — Medication 40 MILLIEQUIVALENT(S): at 09:22

## 2022-01-26 RX ADMIN — LIDOCAINE 1 PATCH: 4 CREAM TOPICAL at 19:16

## 2022-01-26 RX ADMIN — ENOXAPARIN SODIUM 50 MILLIGRAM(S): 100 INJECTION SUBCUTANEOUS at 09:34

## 2022-01-26 RX ADMIN — SENNA PLUS 2 TABLET(S): 8.6 TABLET ORAL at 22:29

## 2022-01-26 RX ADMIN — PIPERACILLIN AND TAZOBACTAM 200 GRAM(S): 4; .5 INJECTION, POWDER, LYOPHILIZED, FOR SOLUTION INTRAVENOUS at 13:29

## 2022-01-26 RX ADMIN — Medication 40 MILLIEQUIVALENT(S): at 12:45

## 2022-01-26 RX ADMIN — Medication 10 MILLIGRAM(S): at 22:29

## 2022-01-26 RX ADMIN — POLYETHYLENE GLYCOL 3350 17 GRAM(S): 17 POWDER, FOR SOLUTION ORAL at 18:52

## 2022-01-26 RX ADMIN — FINASTERIDE 5 MILLIGRAM(S): 5 TABLET, FILM COATED ORAL at 12:46

## 2022-01-26 RX ADMIN — PIPERACILLIN AND TAZOBACTAM 200 GRAM(S): 4; .5 INJECTION, POWDER, LYOPHILIZED, FOR SOLUTION INTRAVENOUS at 01:29

## 2022-01-26 RX ADMIN — PIPERACILLIN AND TAZOBACTAM 200 GRAM(S): 4; .5 INJECTION, POWDER, LYOPHILIZED, FOR SOLUTION INTRAVENOUS at 23:57

## 2022-01-26 RX ADMIN — ATORVASTATIN CALCIUM 40 MILLIGRAM(S): 80 TABLET, FILM COATED ORAL at 22:29

## 2022-01-26 RX ADMIN — TAMSULOSIN HYDROCHLORIDE 0.4 MILLIGRAM(S): 0.4 CAPSULE ORAL at 22:29

## 2022-01-26 RX ADMIN — OLANZAPINE 2.5 MILLIGRAM(S): 15 TABLET, FILM COATED ORAL at 18:40

## 2022-01-26 RX ADMIN — OLANZAPINE 5 MILLIGRAM(S): 15 TABLET, FILM COATED ORAL at 19:52

## 2022-01-26 RX ADMIN — Medication 1 TABLET(S): at 12:47

## 2022-01-26 RX ADMIN — PIPERACILLIN AND TAZOBACTAM 200 GRAM(S): 4; .5 INJECTION, POWDER, LYOPHILIZED, FOR SOLUTION INTRAVENOUS at 18:52

## 2022-01-26 RX ADMIN — QUETIAPINE FUMARATE 25 MILLIGRAM(S): 200 TABLET, FILM COATED ORAL at 12:46

## 2022-01-26 NOTE — CHART NOTE - NSCHARTNOTEFT_GEN_A_CORE
Pt retaining urine >500cc. Notified by RN that pt refused waite insertion and was physically resisting. Seen at bedside, explained to patient the need for waite catheter as pt is unable to urinate himself. Risks of refusing waite catheter explained to patient including infection, worsening kidney function and possible bladder rupture. Pt unable to teach back what was told to him, lacking insight into his medical condition and being combative, therefore determined to be lacking decisional making capacity. Decision made to chemically sedate the pt with a total of zyprexa 7.5mg IM to allow for waite catheter insertion in the setting of persistent urinary retention. Waite catheter successfully placed by urology team around 8PM on 01/26/22.

## 2022-01-26 NOTE — PROGRESS NOTE ADULT - SUBJECTIVE AND OBJECTIVE BOX
OVERNIGHT EVENTS: 8PM vanc 15. reordered 1g Q24 for 3 doses. midnight bladder scan 214, AM bladder scan 525, nurse unable to straight cath.    SUBJECTIVE / INTERVAL HPI: Patient seen and examined at bedside. Pt denies any worsening cough, SOB, dysuria. Per RN no BMs since 01/20      PHYSICAL EXAM:  Constitutional: NAD, off restraints  HEENT: NC/AT, PERRLA, EOMI, no conjunctival pallor or scleral icterus, very dry MM  Neck: Supple, no JVD  Respiratory: Right lower lobe basilar crackles. No w/r/r, TTP on right axillary region  Cardiovascular: RRR, normal S1 and S2, no m/r/g.   Gastrointestinal: +BS, soft NTND, no guarding or rebound tenderness, no palpable masses   : waite removed  Extremities: wwp; no cyanosis, clubbing or edema.   Vascular: Pulses equal and strong throughout.   Neurological: AAOx2, no CN deficits, strength and sensation intact throughout.   Skin: No gross skin abnormalities or rashes    VITAL SIGNS:  Vital Signs Last 24 Hrs  T(C): 36.7 (26 Jan 2022 06:07), Max: 36.7 (25 Jan 2022 21:36)  T(F): 98 (26 Jan 2022 06:07), Max: 98.1 (25 Jan 2022 21:36)  HR: 90 (26 Jan 2022 06:07) (86 - 90)  BP: 108/67 (26 Jan 2022 06:07) (96/60 - 108/67)  BP(mean): --  RR: 18 (26 Jan 2022 06:07) (18 - 18)  SpO2: 96% (26 Jan 2022 06:07) (94% - 97%)      MEDICATIONS:  MEDICATIONS  (STANDING):  atorvastatin 40 milliGRAM(s) Oral at bedtime  finasteride 5 milliGRAM(s) Oral daily  lidocaine   4% Patch 1 Patch Transdermal daily  melatonin 10 milliGRAM(s) Oral at bedtime  multivitamin 1 Tablet(s) Oral daily  piperacillin/tazobactam IVPB.. 3.375 Gram(s) IV Intermittent every 6 hours  polyethylene glycol 3350 17 Gram(s) Oral two times a day  potassium chloride   Powder 40 milliEquivalent(s) Oral once  QUEtiapine 25 milliGRAM(s) Oral daily  senna 2 Tablet(s) Oral at bedtime  tamsulosin 0.4 milliGRAM(s) Oral at bedtime    MEDICATIONS  (PRN):  acetaminophen     Tablet .. 650 milliGRAM(s) Oral every 6 hours PRN Mild Pain (1 - 3), Moderate Pain (4 - 6)      ALLERGIES:  Allergies    No Known Allergies    Intolerances        LABS:                        11.9   17.93 )-----------( 241      ( 26 Jan 2022 07:50 )             37.1     01-26    138  |  102  |  31<H>  ----------------------------<  130<H>  3.4<L>   |  25  |  1.72<H>    Ca    9.2      26 Jan 2022 07:50  Phos  3.5     01-25  Mg     2.2     01-25    TPro  6.6  /  Alb  2.5<L>  /  TBili  0.8  /  DBili  x   /  AST  35  /  ALT  25  /  AlkPhos  120  01-26        CAPILLARY BLOOD GLUCOSE          RADIOLOGY & ADDITIONAL TESTS: Reviewed. Hospital Course  87 yo M w/ PMH CHF (Unknown EF), HTN, CKD (baseline Cr 1.3), BPH presented from [facility] w/ right-sided pleuritic and back bharat along with generalized weakness/poor PO intake, presented on 1/22/22 and found with sepsis i/s/o COVID PNA, not started on any COVID treatment as patient was not hypoxic. Pt's SBPs in the 80-90s and BPH meds here held. Pt found with RLL infiltrate with WBC 24.5 with neutrophilic predominance. Pt started on vanc and zosyn as MRSA positive. Since then downtrending WBC and inflammatory markers. On night of 1/22/22, patient delirious, bladder scan showed 750cc. Attempted to straight cath but unable. Pt agitated, and given zyprexa 5 mg IM x3. Urology consulted and waite placed, and gradually restarted BPH meds. Waite removed on 1/25. TOV....  OVERNIGHT EVENTS: 8PM vanc 15. reordered 1g Q24 for 3 doses. midnight bladder scan 214, AM bladder scan 525, nurse unable to straight cath.    SUBJECTIVE / INTERVAL HPI: Patient seen and examined at bedside. Pt denies any worsening cough, SOB, dysuria. Per RN no BMs since 01/20      PHYSICAL EXAM:  Constitutional: NAD, off restraints  HEENT: NC/AT, PERRLA, EOMI, no conjunctival pallor or scleral icterus, very dry MM  Neck: Supple, no JVD  Respiratory: Right lower lobe basilar crackles. No w/r/r, TTP on right axillary region  Cardiovascular: RRR, normal S1 and S2, no m/r/g.   Gastrointestinal: +BS, soft NTND, no guarding or rebound tenderness, no palpable masses   : waite removed  Extremities: wwp; no cyanosis, clubbing or edema.   Vascular: Pulses equal and strong throughout.   Neurological: AAOx2, no CN deficits, strength and sensation intact throughout.   Skin: No gross skin abnormalities or rashes    VITAL SIGNS:  Vital Signs Last 24 Hrs  T(C): 36.7 (26 Jan 2022 06:07), Max: 36.7 (25 Jan 2022 21:36)  T(F): 98 (26 Jan 2022 06:07), Max: 98.1 (25 Jan 2022 21:36)  HR: 90 (26 Jan 2022 06:07) (86 - 90)  BP: 108/67 (26 Jan 2022 06:07) (96/60 - 108/67)  BP(mean): --  RR: 18 (26 Jan 2022 06:07) (18 - 18)  SpO2: 96% (26 Jan 2022 06:07) (94% - 97%)      MEDICATIONS:  MEDICATIONS  (STANDING):  atorvastatin 40 milliGRAM(s) Oral at bedtime  finasteride 5 milliGRAM(s) Oral daily  lidocaine   4% Patch 1 Patch Transdermal daily  melatonin 10 milliGRAM(s) Oral at bedtime  multivitamin 1 Tablet(s) Oral daily  piperacillin/tazobactam IVPB.. 3.375 Gram(s) IV Intermittent every 6 hours  polyethylene glycol 3350 17 Gram(s) Oral two times a day  potassium chloride   Powder 40 milliEquivalent(s) Oral once  QUEtiapine 25 milliGRAM(s) Oral daily  senna 2 Tablet(s) Oral at bedtime  tamsulosin 0.4 milliGRAM(s) Oral at bedtime    MEDICATIONS  (PRN):  acetaminophen     Tablet .. 650 milliGRAM(s) Oral every 6 hours PRN Mild Pain (1 - 3), Moderate Pain (4 - 6)      ALLERGIES:  Allergies    No Known Allergies    Intolerances        LABS:                        11.9   17.93 )-----------( 241      ( 26 Jan 2022 07:50 )             37.1     01-26    138  |  102  |  31<H>  ----------------------------<  130<H>  3.4<L>   |  25  |  1.72<H>    Ca    9.2      26 Jan 2022 07:50  Phos  3.5     01-25  Mg     2.2     01-25    TPro  6.6  /  Alb  2.5<L>  /  TBili  0.8  /  DBili  x   /  AST  35  /  ALT  25  /  AlkPhos  120  01-26        CAPILLARY BLOOD GLUCOSE          RADIOLOGY & ADDITIONAL TESTS: Reviewed. Hospital Course  85 yo M w/ PMH CHF (Unknown EF), HTN, CKD (baseline Cr 1.3), BPH presented from [facility] w/ right-sided pleuritic and back bharat along with generalized weakness/poor PO intake, presented on 1/22/22 and found with sepsis i/s/o COVID PNA, not started on any COVID treatment as patient was not hypoxic. Pt's SBPs in the 80-90s and BPH meds here held. Pt found with RLL infiltrate with WBC 24.5 with neutrophilic predominance. Pt started on vanc and zosyn as MRSA positive. Since then downtrending WBC and inflammatory markers. On night of 1/22/22, patient delirious, bladder scan showed 750cc. Attempted to straight cath but unable. Pt agitated, and given zyprexa 5 mg IM x3. Urology consulted and waite placed, and gradually restarted BPH meds. Waite removed on 1/25, failed TOV, pending Waite replacement.     OVERNIGHT EVENTS: 8PM vanc 15. reordered 1g Q24 for 3 doses. midnight bladder scan 214, AM bladder scan 525, nurse unable to straight cath.    SUBJECTIVE / INTERVAL HPI: Patient seen and examined at bedside. Pt denies any worsening cough, SOB, dysuria. Per RN no BMs since 01/20      PHYSICAL EXAM:  Constitutional: NAD, off restraints  HEENT: NC/AT, PERRLA, EOMI, no conjunctival pallor or scleral icterus, very dry MM  Neck: Supple, no JVD  Respiratory: Right lower lobe basilar crackles. No w/r/r, TTP on right axillary region  Cardiovascular: RRR, normal S1 and S2, no m/r/g.   Gastrointestinal: +BS, soft NTND, no guarding or rebound tenderness, no palpable masses   : waite removed  Extremities: wwp; no cyanosis, clubbing or edema.   Vascular: Pulses equal and strong throughout.   Neurological: AAOx2, no CN deficits, strength and sensation intact throughout.   Skin: No gross skin abnormalities or rashes    VITAL SIGNS:  Vital Signs Last 24 Hrs  T(C): 36.7 (26 Jan 2022 06:07), Max: 36.7 (25 Jan 2022 21:36)  T(F): 98 (26 Jan 2022 06:07), Max: 98.1 (25 Jan 2022 21:36)  HR: 90 (26 Jan 2022 06:07) (86 - 90)  BP: 108/67 (26 Jan 2022 06:07) (96/60 - 108/67)  BP(mean): --  RR: 18 (26 Jan 2022 06:07) (18 - 18)  SpO2: 96% (26 Jan 2022 06:07) (94% - 97%)      MEDICATIONS:  MEDICATIONS  (STANDING):  atorvastatin 40 milliGRAM(s) Oral at bedtime  finasteride 5 milliGRAM(s) Oral daily  lidocaine   4% Patch 1 Patch Transdermal daily  melatonin 10 milliGRAM(s) Oral at bedtime  multivitamin 1 Tablet(s) Oral daily  piperacillin/tazobactam IVPB.. 3.375 Gram(s) IV Intermittent every 6 hours  polyethylene glycol 3350 17 Gram(s) Oral two times a day  potassium chloride   Powder 40 milliEquivalent(s) Oral once  QUEtiapine 25 milliGRAM(s) Oral daily  senna 2 Tablet(s) Oral at bedtime  tamsulosin 0.4 milliGRAM(s) Oral at bedtime    MEDICATIONS  (PRN):  acetaminophen     Tablet .. 650 milliGRAM(s) Oral every 6 hours PRN Mild Pain (1 - 3), Moderate Pain (4 - 6)      ALLERGIES:  Allergies    No Known Allergies    Intolerances        LABS:                        11.9   17.93 )-----------( 241      ( 26 Jan 2022 07:50 )             37.1     01-26    138  |  102  |  31<H>  ----------------------------<  130<H>  3.4<L>   |  25  |  1.72<H>    Ca    9.2      26 Jan 2022 07:50  Phos  3.5     01-25  Mg     2.2     01-25    TPro  6.6  /  Alb  2.5<L>  /  TBili  0.8  /  DBili  x   /  AST  35  /  ALT  25  /  AlkPhos  120  01-26        CAPILLARY BLOOD GLUCOSE          RADIOLOGY & ADDITIONAL TESTS: Reviewed.

## 2022-01-26 NOTE — PROGRESS NOTE ADULT - PROBLEM SELECTOR PLAN 1
on admission with BP 87/57, WBC 24.53 with neutrophilic predom 87.6%. Lactate 1.0. CXR RLL infiltrate. UA unremarkable. 94% on room air->97% on 2LNC. Right basilar crackles on exam. COVID +, Procal 0.46  MRSA positive  downtrending WBC on 1/26/22 but still at 17  SpO2 95% on RA on 1/26/22  CRP downtrending but Ferritin and D dimer increasing    - Will d/c vancomycin given clinical improvement and i/s/o worsening renal function. Low threshold to resume if signs of worsening infection  - c/w zosyn 3.375g IV Q6 (pseudomonal dosing)  - Will get CT chest W IV contrast to assess the PNA.  - Will get RP US to assess for urinary infectious soruce i/s/o urinary retention

## 2022-01-26 NOTE — PROGRESS NOTE ADULT - PROBLEM SELECTOR PLAN 6
history of HLD    - continue home Rosuvastatin 10mg daily (atorvastatin 40mg daily)      #Delirium  Multifactorial i/s/o sepsis, urinary retention and pain  Pt on restraints starting 1/23 which was switched to mittens 1/24 AM and mittens removed 1/25 AM    - do not use restraints/mittens as patient needs to go to [facility] rehab and need 24 hours w/o restraints  - c/w seroquel 25 mg at 7 PM to prevent sundowning  - c/w melatonin 10 mg QHS  - can use zyprexa 5 mg IM if severe agitation or pt can harm self or others.      #Right upper extremity infiltration  2/2 to peripheral IV line, switched to left side on 1/25    - f/u RUE US

## 2022-01-26 NOTE — PROCEDURE NOTE - NSURITECHNIQUE_GU_A_CORE
Proper hand hygiene was performed/Sterile gloves were worn for the duration of the procedure/A sterile drape was used to cover all adjacent areas/The site was cleaned with soap/water and sterile solution (betadine)/The catheter was appropriately lubricated/The catheter was secured with a securement device (e.g. StatLock)/The urinary drainage system is closed at the end of the procedure/The collection bag is below the level of the patient and urinary bladder/All applicable medical record documentation is completed
Proper hand hygiene was performed/Sterile gloves were worn for the duration of the procedure/A sterile drape was used to cover all adjacent areas/The site was cleaned with soap/water and sterile solution (betadine)/The catheter was appropriately lubricated/The catheter was secured with a securement device (e.g. StatLock)/The urinary drainage system is closed at the end of the procedure/The collection bag is below the level of the patient and urinary bladder/All applicable medical record documentation is completed

## 2022-01-26 NOTE — PROGRESS NOTE ADULT - ASSESSMENT
85 yo M w/ PMH CHF (Unknown EF), HTN, CKD (baseline Cr 1.3), BPH presented from [facility] w/ right-sided CP and back bharat along with generalized weakness/poor PO intake, found with sepsis i/s/o asymptomatic COVID and MRSA PNA - downtrending WBC and inflammatory markers, as well as mixed MURRAY, course c/b urinary retention, course c/b delirium

## 2022-01-26 NOTE — PROGRESS NOTE ADULT - PROBLEM SELECTOR PLAN 7
F: None  E: Replete K<4, Mg<2  N: DASH/TLC diet  DVT ppx: Lovenox 25mg subq BID  Code: FULL  Dispo: [facility]

## 2022-01-26 NOTE — PROGRESS NOTE ADULT - ASSESSMENT
per Internal Medicine    85 yo M w/ PMH CHF (Unknown EF), HTN, CKD (baseline Cr 1.3), BPH presented from [facility] w/ right-sided CP and back bharat along with generalized weakness/poor PO intake, found with sepsis i/s/o asymptomatic COVID and MRSA PNA - downtrending WBC and inflammatory markers, as well as mixed MURRAY, course c/b urinary retention, course c/b delirium       Problem/Plan - 1:  ·  Problem: Sepsis due to pneumonia.   ·  Plan: on admission with BP 87/57, WBC 24.53 with neutrophilic predom 87.6%. Lactate 1.0. CXR RLL infiltrate. UA unremarkable. 94% on room air->97% on 2LNC. Right basilar crackles on exam. COVID +, Procal 0.46  MRSA positive  downtrending WBC on 1/26/22 but still at 17  SpO2 95% on RA on 1/26/22  CRP downtrending but Ferritin and D dimer increasing    - Will d/c vancomycin given clinical improvement and i/s/o worsening renal function. Low threshold to resume if signs of worsening infection  - c/w zosyn 3.375g IV Q6 (pseudomonal dosing)  - Will get CT chest W IV contrast to assess the PNA.  - Will get RP US to assess for urinary infectious soruce i/s/o urinary retention.    Problem/Plan - 2:  ·  Problem: Acute kidney injury superimposed on CKD.   ·  Plan: on admission with cr 1.59 (baseline cr 1.3) likely in the setting of hypovolemia. Patient with low sBPs 80-90 and reporting decreased PO intake the past few days. Last BM on 1/20. Retaining 525CC on AM bladder scan  Possible 2/2 to poor PO, antibiotics (V/Z), post obstructive given urinary retention (possibly from constipation). Cr increased today    - Will given 500cc LR bolus  - speech/swallow consulted to assess PO tolerance  - f/u Noon BS-> will require waite if retaining  - d/c'd vanc  as above  - increased bowel regimen to miralax BID  - Monitor BMs as possible cause of retention  - Trend Cr.    Problem/Plan - 3:  ·  Problem: 2019 novel coronavirus disease (COVID-19).   ·  Plan: on admission with BP 87/57, WBC 24.53 with neutrophilic predom 87.6%. Lactate 1.0. CXR RLL infiltrate. UA unremarkable. 94% on room air->97% on 2LNC. Right basilar crackles on exam. COVID +, Procal 0.46    - No need for treatment with DEX or REM  - monitor if need for supplemental oxygen  - Changed lovenox to 25mg BID per  protocol for COVID pts.    Problem/Plan - 4:  ·  Problem: CHF (congestive heart failure).   ·  Plan: reported charted history of CHF, EF unknown, pt denies h/o CHF and reports normal ECHO. BNP 2006. No JVD, crackles, or LE edema on exam. Cardiac enzymes negative. EKG with no acute ischemic changes. CXR with RLL infiltrate and does not appear to be overload in nature    - does not appear to be in exacerbation at this time > euvolemic.    Problem/Plan - 5:  ·  Problem: BPH (benign prostatic hyperplasia).   ·  Plan: History of BPH     - hold home oxybutynin 5mg, consider restarting once BPs normalize  - c/w Finasteride 5 mg qd  - c/w tamsulosin 0.4 mg qd    #Urinary retention  as above    - f/u Noon BS  - bladder scan/straight cath Q6H.    Problem/Plan - 6:  ·  Problem: HLD (hyperlipidemia).   ·  Plan: history of HLD    - continue home Rosuvastatin 10mg daily (atorvastatin 40mg daily)      #Delirium  Multifactorial i/s/o sepsis, urinary retention and pain  Pt on restraints starting 1/23 which was switched to mittens 1/24 AM and mittens removed 1/25 AM    - do not use restraints/mittens as patient needs to go to [facility] and need 24 hours w/o restraints  - c/w seroquel 25 mg at 7 PM to prevent sundowning  - c/w melatonin 10 mg QHS  - can use zyprexa 5 mg IM if severe agitation or pt can harm self or others.      #Right upper extremity infiltration  2/2 to peripheral IV line, switched to left side on 1/25    - f/u RUE .    Problem/Plan - 7:  ·  Problem: Prophylactic measure.   ·  Plan: F: None  E: Replete K<4, Mg<2  N: DASH/TLC diet  DVT ppx: Lovenox 25mg subq BID  Code: FULL

## 2022-01-26 NOTE — PROGRESS NOTE ADULT - ATTENDING COMMENTS
Date of service 1/26/22  86M w HTN, CKD (1.4), BPH, HTN, p/w malaise, decreased intake from [facility] found to have sepsis 2/2 COVID19 pna and RLL infiltrates and MURRAY on CKD, c/b urinary retention w waite placement 1/23 and delirium    TOV Yesterday however pt w evidence of elevated PVRS. Pt denies any cough, chest pain, dyspnea. No pain in RUE. Noted increasing Cr 1. 7 from 1.6. CRP Continues to improve 226 from 300. WBC 17 from 21.     Plan  f/u RUE Doppler  DC Vancomycin as pt appears clinically improving  Continue IV Zosyn for for RLL Pna  In setting of increased D Dimer 1938 from 1000, will obtain CTA/PE  RP US in setting of increased Cr - ddx includes pre-renal from decreased PO/Fluid intake. Intra-renal from Vanc/Zosyn combination, and then obstruction    DISPO: CIERRA pending improvement. Pt is long term resident at [facility]

## 2022-01-26 NOTE — PROGRESS NOTE ADULT - PROBLEM SELECTOR PLAN 3
on admission with BP 87/57, WBC 24.53 with neutrophilic predom 87.6%. Lactate 1.0. CXR RLL infiltrate. UA unremarkable. 94% on room air->97% on 2LNC. Right basilar crackles on exam. COVID +, Procal 0.46    - No need for treatment with DEX or REM  - monitor if need for supplemental oxygen  - Changed lovenox to 25mg BID per [facility] protocol for COVID pts

## 2022-01-26 NOTE — PROGRESS NOTE ADULT - SUBJECTIVE AND OBJECTIVE BOX
Physical Medicine and Rehabilitation Progress Note:    Patient is a 86y old  Male who presents with a chief complaint of sepsis due to pneumonia (2022 09:02)      HPI:  86M PMH CHF, HTN, CKD (baseline Cr 1.3), BPH  presents to the ED from [facility] with a complaint of pain when breathing and generalized weakness for the past 2 days. He states that when he takes a deep breath he feel pain on the right side of his chest but otherwise denies nay shortness of breath or pain at rest. He says he has been eating less the past few days and feeling more weak. Patient has been a resident of [facility] for years and has no fevers, rhinorrhea, cough, abdominal pain, n/v/d/c, edema. Patient has been vaccinated x2 for COVID. He is full code.    ED Course:   Vitals: T 98.6F (rectal), HR 86, BP 98/54, RR 18, O2 94% on RA  Labs: WBC 24.53, Neutrophils 87.6%, hb 11.7, plts 166, Lactate 1.0, Na 136, K 4.0, Cl 103, CO2 22, AG 13, BUN/Cr 26/1.59 (cr 1.3 baseline), glucose 122, trops 0.01, BNP 2006, procal 0.46, UA negative, COVID+  EKG: NSR HR 73, no acute ischemic changes, qtc 416  Imaging: CXR RLL infiltrate  Interventions: Vancomycin 1g IV and Zosyn 3.375g IV, (reported 500cc bolus before arrival)   (2022 22:27)                            11.9   17.93 )-----------( 241      ( 2022 07:50 )             37.1           138  |  102  |  31<H>  ----------------------------<  130<H>  3.4<L>   |  25  |  1.72<H>    Ca    9.2      2022 07:50  Phos  3.5       Mg     2.2         TPro  6.6  /  Alb  2.5<L>  /  TBili  0.8  /  DBili  x   /  AST  35  /  ALT  25  /  AlkPhos  120  01-26    Vital Signs Last 24 Hrs  T(C): 36.7 (2022 06:07), Max: 36.7 (2022 21:36)  T(F): 98 (2022 06:07), Max: 98.1 (2022 21:36)  HR: 90 (2022 06:07) (86 - 90)  BP: 108/67 (2022 06:07) (96/60 - 108/67)  BP(mean): --  RR: 18 (2022 06:07) (18 - 18)  SpO2: 96% (2022 06:07) (94% - 97%)    MEDICATIONS  (STANDING):  atorvastatin 40 milliGRAM(s) Oral at bedtime  finasteride 5 milliGRAM(s) Oral daily  lidocaine   4% Patch 1 Patch Transdermal daily  melatonin 10 milliGRAM(s) Oral at bedtime  multivitamin 1 Tablet(s) Oral daily  piperacillin/tazobactam IVPB.. 3.375 Gram(s) IV Intermittent every 6 hours  polyethylene glycol 3350 17 Gram(s) Oral two times a day  QUEtiapine 25 milliGRAM(s) Oral daily  senna 2 Tablet(s) Oral at bedtime  tamsulosin 0.4 milliGRAM(s) Oral at bedtime    MEDICATIONS  (PRN):  acetaminophen     Tablet .. 650 milliGRAM(s) Oral every 6 hours PRN Mild Pain (1 - 3), Moderate Pain (4 - 6)    Currently Undergoing Physical/ Occupational Therapy at bedside.    Functional Status Assessment:   2022      Cognitive/Neuro/Behavioral  Level of Consciousness: confused  Arousal Level: arouses to voice  Orientation: disoriented to;  situation;  time;  place  Speech: illogical;  well paced  Mood/Behavior: apprehensive    Language Assistance  Preferred Language to Address Healthcare Preferred Language to Address Healthcare: English    Therapeutic Interventions      Bed Mobility  Bed Mobility Training Rolling/Turnin person assist;  verbal cues;  minimum assist (75% patient effort);  set-up required;  bed rails  Bed Mobility Training Scooting: bed rails;  1 person assist;  nonverbal cues (demo/gestures);  verbal cues;  set-up required;  moderate assist (50% patient effort)  Bed Mobility Training Sit-to-Supine: bed rails;  1 person assist;  minimum assist (75% patient effort);  verbal cues;  nonverbal cues (demo/gestures);  set-up required  Bed Mobility Training Supine-to-Sit: moderate assist (50% patient effort);  1 person assist;  nonverbal cues (demo/gestures);  verbal cues;  set-up required;  bed rails  Bed Mobility Training Limitations: decreased strength;  impaired balance;  impaired postural control;  decreased ability to use arms for pushing/pulling;  decreased ability to use legs for bridging/pushing;  impaired ability to control trunk for mobility    Sit-Stand Transfer Training  Sit-to-Stand Transfer Training Symptoms Noted During/After Treatment: fatigue;  dizziness;  neg orthostatics  Transfer Training Sit-to-Stand Transfer: HHA x2 ;  nonverbal cues (demo/gestures);  verbal cues;  set-up required;  minimum assist (75% patient effort);  2 person assist  Transfer Training Stand-to-Sit Transfer: minimum assist (75% patient effort);  HHA x2;  2 person assist;  nonverbal cues (demo/gestures);  verbal cues;  set-up required  Sit-to-Stand Transfer Training Transfer Safety Analysis: decreased balance;  decreased weight-shifting ability;  impaired postural control;  impaired motor control;  decreased strength;  impaired balance    Gait Training  Gait Training Symptoms Noted During/After Treatment: dizziness;  fatigue;  neg orthostatics  Gait Training: length of bed ~7ft;  HHA x2;  2 person assist;  nonverbal cues (demo/gestures);  verbal cues;  set-up required;  moderate assist (50% patient effort)  Gait Analysis: 3-point gait   Pt took side steps from HOB to foot of bed/return with HHA x2 on RA. Reported increased dizziness.    decreased ethan;  decreased weight-shifting ability;  decreased stride length;  increased stride width;  Increased knee flexion and kyphotic posture;  decreased strength;  impaired balance;  impaired postural control;  HHA x2  Gait Number of Times:: x 2    Therapeutic Exercise  Therapeutic Exercise Detail: Supine bridges x5, supine knee flex/extension x5 bilaterally, dangled ~5mins     Balance Skills Training  Balance Skills Training Training Strategies: Dangled ~5 mins with alternating levels of assist and hand placement on/off knees. ; Standing for orthostatic posture measurement.   Balance Skills Training Sitting Balance: Static: Fluctuating from poor to fair  Balance Skills Sitting Balance: Dynamic: Fluctuating from poor to fair  Balance Skills Sit-to-Stand Balance: poor balance  Balance Skills Standing Balance: Static: poor balance  Balance Skills Standing Balance: Dynamic: poor plus          PM&R Impression: as above    Current Disposition Plan :    return to [facility]

## 2022-01-26 NOTE — SWALLOW BEDSIDE ASSESSMENT ADULT - NS SPL SWALLOW CLINIC TRIAL FT
Oral stage is significant for prolonged bolus manipulation and formation as texture increased. Pt required max encouragement to accept PO trials. Pharyngeal stage is significant for suspected delay in coordination of respiration and deglutition with thin liquids in setting of impulsivity which resulted in overt signs of airway protection deficits. Pt would benefit from a modified diet of minced and moist with mildly thick liquids. This service will continue to monitor tolerance in 24 hours.

## 2022-01-26 NOTE — SWALLOW BEDSIDE ASSESSMENT ADULT - SWALLOW EVAL: RECOMMENDED FEEDING/EATING TECHNIQUES
crush medication (when feasible)/maintain upright posture during/after eating for 30 mins/no straws/position upright (90 degrees)/small sips/bites

## 2022-01-26 NOTE — PROGRESS NOTE ADULT - PROBLEM SELECTOR PLAN 5
History of BPH     - hold home oxybutynin 5mg, consider restarting once BPs normalize  - c/w Finasteride 5 mg qd  - c/w tamsulosin 0.4 mg qd    #Urinary retention  as above    - f/u Noon BS  - bladder scan/straight cath Q6H

## 2022-01-26 NOTE — PROGRESS NOTE ADULT - PROBLEM SELECTOR PLAN 2
on admission with cr 1.59 (baseline cr 1.3) likely in the setting of hypovolemia. Patient with low sBPs 80-90 and reporting decreased PO intake the past few days. Last BM on 1/20. Retaining 525CC on AM bladder scan  Possible 2/2 to poor PO, antibiotics (V/Z), post obstructive given urinary retention (possibly from constipation). Cr increased today    - Will given 500cc LR bolus  - speech/swallow consulted to assess PO tolerance  - f/u Noon BS-> will require waite if retaining  - d/c'd vanc  as above  - increased bowel regimen to miralax BID  - Monitor BMs as possible cause of retention  - Trend Cr

## 2022-01-27 LAB
ALBUMIN SERPL ELPH-MCNC: 2.5 G/DL — LOW (ref 3.3–5)
ALP SERPL-CCNC: 140 U/L — HIGH (ref 40–120)
ALT FLD-CCNC: 22 U/L — SIGNIFICANT CHANGE UP (ref 10–45)
ANION GAP SERPL CALC-SCNC: 11 MMOL/L — SIGNIFICANT CHANGE UP (ref 5–17)
AST SERPL-CCNC: 31 U/L — SIGNIFICANT CHANGE UP (ref 10–40)
BASOPHILS # BLD AUTO: 0.04 K/UL — SIGNIFICANT CHANGE UP (ref 0–0.2)
BASOPHILS NFR BLD AUTO: 0.2 % — SIGNIFICANT CHANGE UP (ref 0–2)
BILIRUB SERPL-MCNC: 0.8 MG/DL — SIGNIFICANT CHANGE UP (ref 0.2–1.2)
BUN SERPL-MCNC: 23 MG/DL — SIGNIFICANT CHANGE UP (ref 7–23)
CALCIUM SERPL-MCNC: 9.1 MG/DL — SIGNIFICANT CHANGE UP (ref 8.4–10.5)
CHLORIDE SERPL-SCNC: 102 MMOL/L — SIGNIFICANT CHANGE UP (ref 96–108)
CO2 SERPL-SCNC: 26 MMOL/L — SIGNIFICANT CHANGE UP (ref 22–31)
CREAT SERPL-MCNC: 1.56 MG/DL — HIGH (ref 0.5–1.3)
CRP SERPL-MCNC: 186.3 MG/L — HIGH (ref 0–4)
CULTURE RESULTS: SIGNIFICANT CHANGE UP
CULTURE RESULTS: SIGNIFICANT CHANGE UP
D DIMER BLD IA.RAPID-MCNC: 2178 NG/ML DDU — HIGH
EOSINOPHIL # BLD AUTO: 0.02 K/UL — SIGNIFICANT CHANGE UP (ref 0–0.5)
EOSINOPHIL NFR BLD AUTO: 0.1 % — SIGNIFICANT CHANGE UP (ref 0–6)
FERRITIN SERPL-MCNC: 1609 NG/ML — HIGH (ref 30–400)
GLUCOSE SERPL-MCNC: 125 MG/DL — HIGH (ref 70–99)
HCT VFR BLD CALC: 37.3 % — LOW (ref 39–50)
HGB BLD-MCNC: 11.8 G/DL — LOW (ref 13–17)
IMM GRANULOCYTES NFR BLD AUTO: 1.8 % — HIGH (ref 0–1.5)
LYMPHOCYTES # BLD AUTO: 1.14 K/UL — SIGNIFICANT CHANGE UP (ref 1–3.3)
LYMPHOCYTES # BLD AUTO: 6.6 % — LOW (ref 13–44)
MAGNESIUM SERPL-MCNC: 2.4 MG/DL — SIGNIFICANT CHANGE UP (ref 1.6–2.6)
MCHC RBC-ENTMCNC: 31.5 PG — SIGNIFICANT CHANGE UP (ref 27–34)
MCHC RBC-ENTMCNC: 31.6 GM/DL — LOW (ref 32–36)
MCV RBC AUTO: 99.5 FL — SIGNIFICANT CHANGE UP (ref 80–100)
MONOCYTES # BLD AUTO: 0.91 K/UL — HIGH (ref 0–0.9)
MONOCYTES NFR BLD AUTO: 5.2 % — SIGNIFICANT CHANGE UP (ref 2–14)
NEUTROPHILS # BLD AUTO: 14.91 K/UL — HIGH (ref 1.8–7.4)
NEUTROPHILS NFR BLD AUTO: 86.1 % — HIGH (ref 43–77)
NRBC # BLD: 0 /100 WBCS — SIGNIFICANT CHANGE UP (ref 0–0)
PHOSPHATE SERPL-MCNC: 2.9 MG/DL — SIGNIFICANT CHANGE UP (ref 2.5–4.5)
PLATELET # BLD AUTO: 222 K/UL — SIGNIFICANT CHANGE UP (ref 150–400)
POTASSIUM SERPL-MCNC: 3.3 MMOL/L — LOW (ref 3.5–5.3)
POTASSIUM SERPL-SCNC: 3.3 MMOL/L — LOW (ref 3.5–5.3)
PROCALCITONIN SERPL-MCNC: 1.77 NG/ML — HIGH (ref 0.02–0.1)
PROT SERPL-MCNC: 6.4 G/DL — SIGNIFICANT CHANGE UP (ref 6–8.3)
RBC # BLD: 3.75 M/UL — LOW (ref 4.2–5.8)
RBC # FLD: 14.6 % — HIGH (ref 10.3–14.5)
SODIUM SERPL-SCNC: 139 MMOL/L — SIGNIFICANT CHANGE UP (ref 135–145)
SPECIMEN SOURCE: SIGNIFICANT CHANGE UP
SPECIMEN SOURCE: SIGNIFICANT CHANGE UP
WBC # BLD: 17.34 K/UL — HIGH (ref 3.8–10.5)
WBC # FLD AUTO: 17.34 K/UL — HIGH (ref 3.8–10.5)

## 2022-01-27 PROCEDURE — 71275 CT ANGIOGRAPHY CHEST: CPT | Mod: 26

## 2022-01-27 PROCEDURE — 99233 SBSQ HOSP IP/OBS HIGH 50: CPT | Mod: GC

## 2022-01-27 RX ORDER — POTASSIUM CHLORIDE 20 MEQ
40 PACKET (EA) ORAL ONCE
Refills: 0 | Status: COMPLETED | OUTPATIENT
Start: 2022-01-27 | End: 2022-01-27

## 2022-01-27 RX ADMIN — PIPERACILLIN AND TAZOBACTAM 200 GRAM(S): 4; .5 INJECTION, POWDER, LYOPHILIZED, FOR SOLUTION INTRAVENOUS at 17:42

## 2022-01-27 RX ADMIN — LIDOCAINE 1 PATCH: 4 CREAM TOPICAL at 18:47

## 2022-01-27 RX ADMIN — PIPERACILLIN AND TAZOBACTAM 200 GRAM(S): 4; .5 INJECTION, POWDER, LYOPHILIZED, FOR SOLUTION INTRAVENOUS at 12:18

## 2022-01-27 RX ADMIN — LIDOCAINE 1 PATCH: 4 CREAM TOPICAL at 12:18

## 2022-01-27 RX ADMIN — PIPERACILLIN AND TAZOBACTAM 200 GRAM(S): 4; .5 INJECTION, POWDER, LYOPHILIZED, FOR SOLUTION INTRAVENOUS at 05:36

## 2022-01-27 RX ADMIN — POLYETHYLENE GLYCOL 3350 17 GRAM(S): 17 POWDER, FOR SOLUTION ORAL at 17:43

## 2022-01-27 RX ADMIN — Medication 40 MILLIEQUIVALENT(S): at 10:40

## 2022-01-27 RX ADMIN — POLYETHYLENE GLYCOL 3350 17 GRAM(S): 17 POWDER, FOR SOLUTION ORAL at 05:36

## 2022-01-27 RX ADMIN — ENOXAPARIN SODIUM 25 MILLIGRAM(S): 100 INJECTION SUBCUTANEOUS at 10:39

## 2022-01-27 NOTE — PROGRESS NOTE ADULT - ATTENDING COMMENTS
86M w HTN, CKD (1.4), BPH, HTN, p/w malaise, decreased intake from [facility] found to have sepsis 2/2 COVID19 pna and RLL CAP w MURRAY on CKD, c/b urinary retention w waite placement 1/23, again on 1/26 and delirium    Overnight pt required waite placement as he failed TOV - had received zyprexa. This morning he is pleasant appearing. Off restraints. Denies any pain, cough dyspnea. A/O to self, month     #Sepsis 2/2 RLL CAP pna - MRSA PCR Positive. s/p 5d IV Vancomycin. On day 6 of zosyn   - WBC stable 17. CRP continues to downtrend. CRP also 180 from 226. Procalcitonin 1.77. Previously 13.7 on 1/23  #COVID19 - asymptomatic   - Elevated dimer noted. CTA/PE negative for PE  #MURRAY on CKD - Improving. 1.56 from 1.7. Combination of poor po intake, vanc/zosyn, and retention   - RP US w Chronic hydronephrosis on R w enlarged prostate  #BPH w urinary retention - waite placed. On flomax, finasteride  #Delirium - complicated by urinary retention. Started on seroquel 25 daily    Plan  Obtain room air sat  7d course of zoysn thru 1/28  pt to be dc w waite; f/u urology outpatient  mobilize OOB to chair, encourage PO/Fluid intake  CRP, CBC w diff in AM    DISPO: [facility] - [facility] long term resident. Anticipate DC 24-48h

## 2022-01-27 NOTE — PROGRESS NOTE ADULT - ASSESSMENT
85 yo M w/ PMH CHF (Unknown EF), HTN, CKD (baseline Cr 1.3), BPH presented from [facility] w/ right-sided CP and back bharat along with generalized weakness/poor PO intake, found with sepsis i/s/o asymptomatic COVID and MRSA PNA - downtrending WBC and inflammatory markers, as well as mixed MURRAY, course c/b urinary retention and delirium

## 2022-01-27 NOTE — PROGRESS NOTE ADULT - SUBJECTIVE AND OBJECTIVE BOX
INTERVAL HPI/OVERNIGHT EVENTS:  Patient was seen and examined at bedside. Overnight, urology placed a waite, patient had 1350 cc UOP overnight. This AM, patient feels good subjectively, no acute complaints.       VITAL SIGNS:  T(F): 97.7 (01-27-22 @ 05:31)  HR: 94 (01-27-22 @ 05:31)  BP: 104/64 (01-27-22 @ 05:31)  RR: 18 (01-27-22 @ 05:31)  SpO2: 94% (01-27-22 @ 05:31)  Wt(kg): --    PHYSICAL EXAM:  Constitutional: NAD, off restraints, on 2L NC  HEENT: NC/AT, PERRLA, EOMI, no conjunctival pallor or scleral icterus, very dry MM  Neck: Supple, no JVD  Respiratory: Right lower lobe basilar crackles. No w/r/r, TTP on right axillary region  Cardiovascular: RRR, normal S1 and S2, no m/r/g.   Gastrointestinal: +BS, soft NTND, no guarding or rebound tenderness, no palpable masses   : waite removed  Extremities: wwp; no cyanosis, clubbing or edema.   Vascular: Pulses equal and strong throughout.   Neurological: AAOx2, no CN deficits, strength and sensation intact throughout.   Skin: No gross skin abnormalities or rashes      MEDICATIONS  (STANDING):  atorvastatin 40 milliGRAM(s) Oral at bedtime  enoxaparin Injectable 25 milliGRAM(s) SubCutaneous every 12 hours  finasteride 5 milliGRAM(s) Oral daily  lidocaine   4% Patch 1 Patch Transdermal daily  melatonin 10 milliGRAM(s) Oral at bedtime  multivitamin 1 Tablet(s) Oral daily  piperacillin/tazobactam IVPB.. 3.375 Gram(s) IV Intermittent every 6 hours  polyethylene glycol 3350 17 Gram(s) Oral two times a day  potassium chloride   Powder 40 milliEquivalent(s) Oral once  QUEtiapine 25 milliGRAM(s) Oral daily  senna 2 Tablet(s) Oral at bedtime  tamsulosin 0.4 milliGRAM(s) Oral at bedtime    MEDICATIONS  (PRN):  acetaminophen     Tablet .. 650 milliGRAM(s) Oral every 6 hours PRN Mild Pain (1 - 3), Moderate Pain (4 - 6)      Allergies    No Known Allergies    Intolerances        LABS:                        11.8   17.34 )-----------( 222      ( 27 Jan 2022 06:14 )             37.3     01-27    139  |  102  |  23  ----------------------------<  125<H>  3.3<L>   |  26  |  1.56<H>    Ca    9.1      27 Jan 2022 06:14  Phos  2.9     01-27  Mg     2.4     01-27    TPro  6.4  /  Alb  2.5<L>  /  TBili  0.8  /  DBili  x   /  AST  31  /  ALT  22  /  AlkPhos  140<H>  01-27          RADIOLOGY & ADDITIONAL TESTS:  Reviewed

## 2022-01-27 NOTE — PROGRESS NOTE ADULT - PROBLEM SELECTOR PLAN 2
History of BPH   #Urinary retention  Urology placed waite catheter on 1/26, with significant UOP since    - hold home oxybutynin 5mg, consider restarting once BPs normalize  - c/w Finasteride 5 mg qd  - c/w tamsulosin 0.4 mg qd  - monitor I&Os

## 2022-01-27 NOTE — PROGRESS NOTE ADULT - PROBLEM SELECTOR PLAN 6
history of HLD    - continue home Rosuvastatin 10mg daily (atorvastatin 40mg daily)      #Delirium  Multifactorial i/s/o sepsis, urinary retention and pain  Pt on restraints starting 1/23 which was switched to mittens 1/24 AM and mittens removed 1/25 AM    - do not use restraints/mittens as patient needs to go to [facility] rehab and need 24 hours w/o restraints  - c/w seroquel 25 mg at 7 PM to prevent sundowning  - c/w melatonin 10 mg QHS  - can use zyprexa 5 mg IM if severe agitation or pt can harm self or others.  - psych re-consult      #Right upper extremity infiltration  2/2 to peripheral IV line, switched to left side on 1/25    - f/u [facility] US

## 2022-01-27 NOTE — PROGRESS NOTE ADULT - PROBLEM SELECTOR PLAN 3
on admission with cr 1.59 (baseline cr 1.3) likely in the setting of hypovolemia. Patient with low sBPs 80-90 and reporting decreased PO intake the past few days. Last BM on 1/20. Retaining 525CC on AM bladder scan  Possible 2/2 to poor PO, antibiotics (V/Z), post obstructive given urinary retention (possibly from constipation).   Creatinine down to 1.56 today, was likely obstructive and creatinine downtrending s/p waite  U/S showing R hydro, likely chronic, could be 2/2 obstruction  - Monitor BMs as possible cause of retention  - Trend Cr  - likely DC w/ waite

## 2022-01-27 NOTE — PROGRESS NOTE ADULT - PROBLEM SELECTOR PLAN 1
on admission with BP 87/57, WBC 24.53 with neutrophilic predom 87.6%. Lactate 1.0. CXR RLL infiltrate. UA unremarkable. 94% on room air->97% on 2LNC. Right basilar crackles on exam. COVID +, Procal 0.46  MRSA positive  downtrending WBC but now plateau at 17  SpO2 95% on RA on 1/26/22  CRP downtrending but Ferritin and D dimer increasing  d/c vancomycin on 1/26 given clinical improvement and i/s/o worsening renal function. Low threshold to resume if signs of worsening infection  - c/w zosyn 3.375g IV Q6 (pseudomonal dosing) for 7 days  - Will get CT chest W IV contrast to assess the PNA and r/o PE i/s/o hypoxia and rising D-dimer  - wean O2

## 2022-01-28 ENCOUNTER — TRANSCRIPTION ENCOUNTER (OUTPATIENT)
Age: 87
End: 2022-01-28

## 2022-01-28 LAB
ALBUMIN SERPL ELPH-MCNC: 2.5 G/DL — LOW (ref 3.3–5)
ALP SERPL-CCNC: 182 U/L — HIGH (ref 40–120)
ALT FLD-CCNC: 32 U/L — SIGNIFICANT CHANGE UP (ref 10–45)
ANION GAP SERPL CALC-SCNC: 11 MMOL/L — SIGNIFICANT CHANGE UP (ref 5–17)
ANION GAP SERPL CALC-SCNC: 11 MMOL/L — SIGNIFICANT CHANGE UP (ref 5–17)
APTT BLD: 34.5 SEC — SIGNIFICANT CHANGE UP (ref 27.5–35.5)
AST SERPL-CCNC: 45 U/L — HIGH (ref 10–40)
BASE EXCESS BLDA CALC-SCNC: 2.4 MMOL/L — SIGNIFICANT CHANGE UP (ref -2–3)
BILIRUB SERPL-MCNC: 0.6 MG/DL — SIGNIFICANT CHANGE UP (ref 0.2–1.2)
BUN SERPL-MCNC: 22 MG/DL — SIGNIFICANT CHANGE UP (ref 7–23)
BUN SERPL-MCNC: 24 MG/DL — HIGH (ref 7–23)
CALCIUM SERPL-MCNC: 9.2 MG/DL — SIGNIFICANT CHANGE UP (ref 8.4–10.5)
CALCIUM SERPL-MCNC: 9.2 MG/DL — SIGNIFICANT CHANGE UP (ref 8.4–10.5)
CHLORIDE SERPL-SCNC: 104 MMOL/L — SIGNIFICANT CHANGE UP (ref 96–108)
CHLORIDE SERPL-SCNC: 105 MMOL/L — SIGNIFICANT CHANGE UP (ref 96–108)
CO2 BLDA-SCNC: 28 MMOL/L — HIGH (ref 19–24)
CO2 SERPL-SCNC: 25 MMOL/L — SIGNIFICANT CHANGE UP (ref 22–31)
CO2 SERPL-SCNC: 25 MMOL/L — SIGNIFICANT CHANGE UP (ref 22–31)
CREAT SERPL-MCNC: 1.38 MG/DL — HIGH (ref 0.5–1.3)
CREAT SERPL-MCNC: 1.4 MG/DL — HIGH (ref 0.5–1.3)
GLUCOSE BLDC GLUCOMTR-MCNC: 93 MG/DL — SIGNIFICANT CHANGE UP (ref 70–99)
GLUCOSE SERPL-MCNC: 106 MG/DL — HIGH (ref 70–99)
GLUCOSE SERPL-MCNC: 146 MG/DL — HIGH (ref 70–99)
HCO3 BLDA-SCNC: 26 MMOL/L — SIGNIFICANT CHANGE UP (ref 21–28)
HCT VFR BLD CALC: 37.7 % — LOW (ref 39–50)
HCT VFR BLD CALC: 38.3 % — LOW (ref 39–50)
HGB BLD-MCNC: 12.1 G/DL — LOW (ref 13–17)
HGB BLD-MCNC: 12.3 G/DL — LOW (ref 13–17)
INR BLD: 1.18 — HIGH (ref 0.88–1.16)
LACTATE SERPL-SCNC: 0.9 MMOL/L — SIGNIFICANT CHANGE UP (ref 0.5–2)
LDH SERPL L TO P-CCNC: 413 U/L — HIGH (ref 50–242)
MAGNESIUM SERPL-MCNC: 2.3 MG/DL — SIGNIFICANT CHANGE UP (ref 1.6–2.6)
MCHC RBC-ENTMCNC: 31.3 PG — SIGNIFICANT CHANGE UP (ref 27–34)
MCHC RBC-ENTMCNC: 31.5 PG — SIGNIFICANT CHANGE UP (ref 27–34)
MCHC RBC-ENTMCNC: 32.1 GM/DL — SIGNIFICANT CHANGE UP (ref 32–36)
MCHC RBC-ENTMCNC: 32.1 GM/DL — SIGNIFICANT CHANGE UP (ref 32–36)
MCV RBC AUTO: 97.7 FL — SIGNIFICANT CHANGE UP (ref 80–100)
MCV RBC AUTO: 98 FL — SIGNIFICANT CHANGE UP (ref 80–100)
NRBC # BLD: 0 /100 WBCS — SIGNIFICANT CHANGE UP (ref 0–0)
NRBC # BLD: 0 /100 WBCS — SIGNIFICANT CHANGE UP (ref 0–0)
PCO2 BLDA: 38 MMHG — SIGNIFICANT CHANGE UP (ref 35–48)
PH BLDA: 7.45 — SIGNIFICANT CHANGE UP (ref 7.35–7.45)
PHOSPHATE SERPL-MCNC: 2.6 MG/DL — SIGNIFICANT CHANGE UP (ref 2.5–4.5)
PLATELET # BLD AUTO: 244 K/UL — SIGNIFICANT CHANGE UP (ref 150–400)
PLATELET # BLD AUTO: 248 K/UL — SIGNIFICANT CHANGE UP (ref 150–400)
PO2 BLDA: 182 MMHG — HIGH (ref 83–108)
POTASSIUM SERPL-MCNC: 3.5 MMOL/L — SIGNIFICANT CHANGE UP (ref 3.5–5.3)
POTASSIUM SERPL-MCNC: 3.5 MMOL/L — SIGNIFICANT CHANGE UP (ref 3.5–5.3)
POTASSIUM SERPL-SCNC: 3.5 MMOL/L — SIGNIFICANT CHANGE UP (ref 3.5–5.3)
POTASSIUM SERPL-SCNC: 3.5 MMOL/L — SIGNIFICANT CHANGE UP (ref 3.5–5.3)
PROT SERPL-MCNC: 7 G/DL — SIGNIFICANT CHANGE UP (ref 6–8.3)
PROTHROM AB SERPL-ACNC: 14 SEC — HIGH (ref 10.6–13.6)
RBC # BLD: 3.86 M/UL — LOW (ref 4.2–5.8)
RBC # BLD: 3.91 M/UL — LOW (ref 4.2–5.8)
RBC # FLD: 14.6 % — HIGH (ref 10.3–14.5)
RBC # FLD: 14.6 % — HIGH (ref 10.3–14.5)
SAO2 % BLDA: 99.7 % — HIGH (ref 94–98)
SODIUM SERPL-SCNC: 140 MMOL/L — SIGNIFICANT CHANGE UP (ref 135–145)
SODIUM SERPL-SCNC: 141 MMOL/L — SIGNIFICANT CHANGE UP (ref 135–145)
WBC # BLD: 19.31 K/UL — HIGH (ref 3.8–10.5)
WBC # BLD: 20.36 K/UL — HIGH (ref 3.8–10.5)
WBC # FLD AUTO: 19.31 K/UL — HIGH (ref 3.8–10.5)
WBC # FLD AUTO: 20.36 K/UL — HIGH (ref 3.8–10.5)

## 2022-01-28 PROCEDURE — 36000 PLACE NEEDLE IN VEIN: CPT

## 2022-01-28 PROCEDURE — 70496 CT ANGIOGRAPHY HEAD: CPT | Mod: 26

## 2022-01-28 PROCEDURE — 71250 CT THORAX DX C-: CPT | Mod: 26

## 2022-01-28 PROCEDURE — 95718 EEG PHYS/QHP 2-12 HR W/VEEG: CPT

## 2022-01-28 PROCEDURE — 99239 HOSP IP/OBS DSCHRG MGMT >30: CPT

## 2022-01-28 PROCEDURE — 74176 CT ABD & PELVIS W/O CONTRAST: CPT | Mod: 26

## 2022-01-28 PROCEDURE — 70498 CT ANGIOGRAPHY NECK: CPT | Mod: 26

## 2022-01-28 PROCEDURE — 0042T: CPT

## 2022-01-28 PROCEDURE — 99291 CRITICAL CARE FIRST HOUR: CPT

## 2022-01-28 RX ORDER — OXYBUTYNIN CHLORIDE 5 MG
5 TABLET ORAL DAILY
Refills: 0 | Status: DISCONTINUED | OUTPATIENT
Start: 2022-01-28 | End: 2022-01-28

## 2022-01-28 RX ORDER — SENNA PLUS 8.6 MG/1
2 TABLET ORAL
Qty: 0 | Refills: 0 | DISCHARGE
Start: 2022-01-28

## 2022-01-28 RX ORDER — MECLIZINE HCL 12.5 MG
1 TABLET ORAL
Qty: 0 | Refills: 0 | DISCHARGE

## 2022-01-28 RX ORDER — QUETIAPINE FUMARATE 200 MG/1
1 TABLET, FILM COATED ORAL
Qty: 0 | Refills: 0 | DISCHARGE
Start: 2022-01-28

## 2022-01-28 RX ORDER — ASPIRIN/CALCIUM CARB/MAGNESIUM 324 MG
1 TABLET ORAL
Qty: 30 | Refills: 0
Start: 2022-01-28 | End: 2022-02-26

## 2022-01-28 RX ORDER — POTASSIUM CHLORIDE 20 MEQ
20 PACKET (EA) ORAL ONCE
Refills: 0 | Status: COMPLETED | OUTPATIENT
Start: 2022-01-28 | End: 2022-01-28

## 2022-01-28 RX ORDER — POLYETHYLENE GLYCOL 3350 17 G/17G
17 POWDER, FOR SOLUTION ORAL
Qty: 0 | Refills: 0 | DISCHARGE
Start: 2022-01-28

## 2022-01-28 RX ORDER — POTASSIUM CHLORIDE 20 MEQ
40 PACKET (EA) ORAL ONCE
Refills: 0 | Status: COMPLETED | OUTPATIENT
Start: 2022-01-28 | End: 2022-01-28

## 2022-01-28 RX ORDER — ENOXAPARIN SODIUM 100 MG/ML
25 INJECTION SUBCUTANEOUS EVERY 12 HOURS
Refills: 0 | Status: DISCONTINUED | OUTPATIENT
Start: 2022-01-28 | End: 2022-02-02

## 2022-01-28 RX ADMIN — PIPERACILLIN AND TAZOBACTAM 200 GRAM(S): 4; .5 INJECTION, POWDER, LYOPHILIZED, FOR SOLUTION INTRAVENOUS at 06:34

## 2022-01-28 RX ADMIN — Medication 10 MILLIGRAM(S): at 22:18

## 2022-01-28 RX ADMIN — Medication 40 MILLIEQUIVALENT(S): at 12:44

## 2022-01-28 RX ADMIN — ENOXAPARIN SODIUM 25 MILLIGRAM(S): 100 INJECTION SUBCUTANEOUS at 10:17

## 2022-01-28 RX ADMIN — POLYETHYLENE GLYCOL 3350 17 GRAM(S): 17 POWDER, FOR SOLUTION ORAL at 06:34

## 2022-01-28 RX ADMIN — SENNA PLUS 2 TABLET(S): 8.6 TABLET ORAL at 22:18

## 2022-01-28 RX ADMIN — FINASTERIDE 5 MILLIGRAM(S): 5 TABLET, FILM COATED ORAL at 12:44

## 2022-01-28 RX ADMIN — ATORVASTATIN CALCIUM 40 MILLIGRAM(S): 80 TABLET, FILM COATED ORAL at 22:18

## 2022-01-28 RX ADMIN — PIPERACILLIN AND TAZOBACTAM 200 GRAM(S): 4; .5 INJECTION, POWDER, LYOPHILIZED, FOR SOLUTION INTRAVENOUS at 19:37

## 2022-01-28 RX ADMIN — ENOXAPARIN SODIUM 25 MILLIGRAM(S): 100 INJECTION SUBCUTANEOUS at 22:18

## 2022-01-28 RX ADMIN — TAMSULOSIN HYDROCHLORIDE 0.4 MILLIGRAM(S): 0.4 CAPSULE ORAL at 22:19

## 2022-01-28 RX ADMIN — PIPERACILLIN AND TAZOBACTAM 200 GRAM(S): 4; .5 INJECTION, POWDER, LYOPHILIZED, FOR SOLUTION INTRAVENOUS at 12:45

## 2022-01-28 RX ADMIN — Medication 1 TABLET(S): at 12:44

## 2022-01-28 RX ADMIN — PIPERACILLIN AND TAZOBACTAM 200 GRAM(S): 4; .5 INJECTION, POWDER, LYOPHILIZED, FOR SOLUTION INTRAVENOUS at 00:27

## 2022-01-28 RX ADMIN — Medication 20 MILLIEQUIVALENT(S): at 21:11

## 2022-01-28 RX ADMIN — LIDOCAINE 1 PATCH: 4 CREAM TOPICAL at 02:00

## 2022-01-28 NOTE — DISCHARGE NOTE NURSING/CASE MANAGEMENT/SOCIAL WORK - PATIENT PORTAL LINK FT
You can access the FollowMyHealth Patient Portal offered by [facility] by registering at the following website: [email or web address]. By joining [employer]’s FollowMyHealth portal, you will also be able to view your health information using other applications (apps) compatible with our system.

## 2022-01-28 NOTE — CONSULT NOTE ADULT - ASSESSMENT
87 yo M w/ PMH CHF (Unknown EF), HTN, CKD (baseline Cr 1.3), BPH presented from [facility] w/ right-sided CP and back bharat along with generalized weakness/poor PO intake, found with sepsis COVID and MRSA PNA - downtrending WBC and inflammatory markers, as well as mixed MURRAY, course c/b urinary retention and delirium. Patient pending discharge but then became unresponsive stroke code called, ICU consult then consulted for concern of inability to protect airway.     Neuro:   #Encephalopathy    Patient becoming obtunded prior to discharge.  There was concern for airway protection when patient was assessed however, vitals signs were stable and patient began to move extremities and open eyes. Cause of acute change in mental state is unknown.  CT head does not appear to show ischemic or nonischemic hemorrhage. The patient may have experienced a seizure this is supported by the acute change in mentation along with stool found on patient bed sheet.  The patient could not recall the events of this episode when his mentation improved.  However labs reveal a normal lactate which does not support seizure. Cardiac evaluation showed be pursued for possible embolization.     -Follow up final read of CT Head   -Obtain EEG   -EKG   -Do not anticoagulate given concern for CVA       #HAP   Patient with MRSA pneumonia and COVID positive.  The patient is status post 5 days of Vancomycin treatment and on day 6 of Zosyn treatment.  Patient with uptrending WBC.  He remains afebrile however given advanced age the patient may not be able to mount a febrile response to pneumonia. Procalcitonin has improved significantly from admission-down from 13.7. Leukocytosis has significantly improved.  Doubt that current infection has caused alteration in mentation.   -Continue Zosyn at 3.375grams q6hrs to complete course on 1/29    #MURRAY on CKD   Creatinine downtrending over course of hospital stay.  Likely multifactorial from decreased po intake from admission, along with urinary retention, and vancomycin use.   -Continue Shepard   -Continue to trend creatinine.     F:None  E: K>4 and Mg>2  N:NPO till passes dysphagia screen   DVT: SCD   Dispo: [facility]

## 2022-01-28 NOTE — PROCEDURE NOTE - NSPROCDETAILS_GEN_ALL_CORE
sterile technique, indwelling urinary device inserted
location identified, draped/prepped, sterile technique used/blood seen on insertion/dressing applied/flushes easily/secured in place/sterile technique, catheter placed
sterile technique, indwelling urinary device inserted

## 2022-01-28 NOTE — PROGRESS NOTE ADULT - ATTENDING COMMENTS
Transfer to MICU for monitoring after episode of unresponsiveness but has now improved after transfer. Was pending discharge after being treated for MRSA pneumonia; COVID + incidentally but not an active issue. Not stroke as no lateralizing signs. Doubt seizure but will do EEG. Not hypoxemic and not hypotensive. Has a complex right sided effusion that is chronic and most likely secondary to recurrent aspiration versus resolving MRSA pneumonia; clinically not c/w empyema; serial daily lung POCUS.

## 2022-01-28 NOTE — PROVIDER CONTACT NOTE (CHANGE IN STATUS NOTIFICATION) - SITUATION
Pt discharged to [facility] via ambulette. Medics upon arrival inform RN regarding pt unresponsive to stimuli.

## 2022-01-28 NOTE — PROGRESS NOTE ADULT - ASSESSMENT
87 yo M w/ PMH CHF (Unknown EF), HTN, CKD (baseline Cr 1.3), BPH presented from [facility] w/ right-sided CP and back bharat along with generalized weakness/poor PO intake, found with sepsis i/s/o asymptomatic COVID and MRSA PNA - downtrending WBC and inflammatory markers, as well as mixed MURRAY, course c/b urinary retention and delirium

## 2022-01-28 NOTE — STROKE CODE NOTE - NIH STROKE SCALE: 11. EXTINCTION AND INATTENTION, QM
Medicare Wellness Visit  Plan for Preventive Care    A good way for you to stay healthy is to use preventive care.  Medicare covers many services that can help you stay healthy.* The goal of these services is to find any health problems as quickly as possible. Finding problems early can help make them easier to treat.  Your personal plan below lists the services you may need and when they are due.     Health Maintenance Summary     Topic Due On Due Status Completed On Postpone Until Reason    Colorectal Cancer Screening - Colonoscopy Feb 23, 2027 Not Due Feb 23, 2017      Immunization - Pneumococcal  Completed Dec 8, 2016      Medicare Wellness Visit Dec 11, 2018 Not Due Dec 11, 2017      IMMUNIZATION - DTaP/Tdap/Td Apr 3, 1968 Postponed  Apr 13, 2018 Insurance or Financial    Immunization-Influenza Sep 1, 2017 Overdue Dec 8, 2016             Preventive Care for Women and Men    Heart Screenings (Cardiovascular):  · Blood tests are used to check your cholesterol, lipid and triglyceride levels. High levels can increase your risk for heart disease and stroke. High levels can be treated with medications, diet and exercise. Lowering your levels can help keep your heart and blood vessels healthy.  Your provider will order these tests if they are needed.    · An ultrasound is done to see if you have an abdominal aortic aneurysm (AAA).  This is an enlargement of one of the main blood vessels that delivers blood to the body.   In the [place], 9,000 deaths are caused by AAA.  You may not even know you have this problem and as many as 1 in 3 people will have a serious problem if it is not treated.  Early diagnosis allows for more effective treatment and cure.  If you have a family history of AAA or are a male age 65-75 who has smoked, you are at higher risk of an AAA.  Your provider can order this test, if needed.    Colorectal Screening:  · There are many tests that are used to check for cancer of your colon and  rectum. You and your provider should discuss what test is best for you and when to have it done.  Options include:  · Screening Colonoscopy: exam of the entire colon, seen through a flexible lighted tube.  · Flexible Sigmoidoscopy: exam of the last third (sigmoid portion) of the colon and rectum, seen through a flexible lighted tube.  · Cologuard DNA stool test: a sample of your stool is used to screen for cancer and unseen blood in your stool.  · Fecal Occult Blood Test: a sample of your stool is studied to find any unseen blood    Flu Shot:  · An immunization that helps to prevent influenza (the flu). You should get this every year. The best time to get the shot is in the fall.    Pneumococcal Shot:  • Vaccines are available that can help prevent pneumococcal disease, which is any type of infection caused by Streptococcus pneumoniae bacteria.   Their use can prevent some cases of pneumonia, meningitis, and sepsis. There are two types of pneumococcal vaccines:   o Conjugate vaccines (PCV-13 or Prevnar 13®) - helps protect against the 13 types of pneumococcal bacteria that are the most common causes of serious infections in children and adults.    o Polysaccharide vaccine (PPSV23 or Mdqemcinl63®) - helps protect against 23 types of pneumococcal bacteria for patients who are recommended to get it.  These vaccines should be given at least 12 months apart.  A booster is usually not needed.     Hepatitis B Shot:  · An immunization that helps to protect people from getting Hepatitis B. Hepatitis B is a virus that spreads through contact with infected blood or body fluids. Many people with the virus do not have symptoms.  The virus can lead to serious problems, such as liver disease. Some people are at higher risk than others. Your doctor will tell you if you need this shot.     Diabetes Screening:  · A test to measure sugar (glucose) in your blood is called a fasting blood sugar. Fasting means you cannot have food or  drink for at least 8 hours before the test. This test can detect diabetes long before you may notice symptoms.    Glaucoma Screening:  · Glaucoma screening is performed by your eye doctor. The test measures the fluid pressure inside your eyes to determine if you have glaucoma.     Hepatitis C Screening:  · A blood test to see if you have the hepatitis C virus.  Hepatitis C attacks the liver and is a major cause of chronic liver disease.  Medicare will cover a single screening for all adults born between 1945 & 1965, or high risk patients (people who have injected illegal drugs or people who have had blood transfusions).  High risk patients who continue to inject illegal drugs can be screened for Hepatitis C every year.    Smoking and Tobacco-Use Cessation Counseling:  · Tobacco is the single greatest cause of disease and early death in our country today. Medication and counseling together can increase a person’s chance of quitting for good.   · Medicare covers two quitting attempts per year, with four counseling sessions per attempt (eight sessions in a 12 month period)    Preventive Screening tests for Women    Screening Mammograms and Breast Exams:  · An x-ray of your breasts to check for breast cancer before you or your doctor may be able to feel it.  If breast cancer is found early it can usually be treated with success.    Pelvic Exams and Pap Tests:  · An exam to check for cervical and vaginal cancer. A Pap test is a lab test in which cells are taken from your cervix and sent to the lab to look for signs of cervical cancer. If cancer of the cervix is found early, chances for a cure are good. Testing can generally end at age 65, or if a woman has a hysterectomy for a benign condition. Your provider may recommend more frequent testing if certain abnormal results are found.    Bone Mass Measurements:  · A painless x-ray of your bone density to see if you are at risk for a broken bone. Bone density refers to the  thickness of bones or how tightly the bone tissue is packed.    Preventive Screening tests for Men    Prostate Screening:  · PSA - Prostate Cancer blood test.  Experts do not recommend routine screening of healthy men with no signs or symptoms of prostate disease.  However, men should not ignore urinary symptoms, and should discuss their family history with their doctor.    *Medicare pays for many preventive services to keep you healthy. For some of these services, you might have to pay a deductible, coinsurance, and / or copayment.  The amounts vary depending on the type of services you need and the kind of Medicare health plan you have.               (0) No abnormality

## 2022-01-28 NOTE — PROGRESS NOTE ADULT - SUBJECTIVE AND OBJECTIVE BOX
Transfer [facility] ---> ICU     Hospital Course: 85 yo M w/ PMH CHF (Unknown EF), HTN, CKD (baseline Cr 1.3), BPH presented from [facility] w/ right-sided pleuritic and back pain along with generalized weakness/poor PO intake, presented on 1/22/22 and found with sepsis i/s/o COVID PNA, not started on any COVID treatment as patient was not hypoxic. Pt's SBPs in the 80-90s and BPH meds here held. Pt found with RLL infiltrate with WBC 24.5 with neutrophilic predominance. Pt started on vanc and zosyn as MRSA positive. Since then downtrending WBC and inflammatory markers. On night of 1/22/22, patient delirious, bladder scan showed 750cc. Attempted to straight cath but unable. Pt agitated, and given zyprexa 5 mg IM x3. Urology consulted and waite placed, and gradually restarted BPH meds. Waite removed on 1/25, failed TOV, pending Waite replacement. With the patient clinically improving so Vancomycin was d/c'ed. CT chest w/ contrast and RP US were ordered due to persistent inflammatory markers and slow downtrending wbc. RP was notable for mild to severe hydronephrosis of R kidney, possibly chronic and mild hydro of L kidney, bladder thickening compatible w/ lateral outlet obstruction 2/2 BPH. Patient was failing TOV so Waite was reordered. Patient on 1/27 was weaned off O2 and CTangio showing RLL PNA but no PE. Patient on 1/28 was stable for discharge back to [facility] w/ waite in place however while ambulance was getting ready to put patient on the stretcher he was obtunded and difficult to arouse with sternal rub. Stroke code was called. Initial work has been unremarkable. Patient was not intubated and is beginning to become more responsive. He will be transferred to [facility] for closer monitoring.       SUBJECTIVE / INTERVAL HPI: Patient seen and examined at bedside. Limited per patient secondary to current mental status. See Chart note for   Remaining ROS negative       PHYSICAL EXAM:    General: Elderly chronic appearing male, frail.   HEENT: NC/AT; pupils reactive 1mm, anicteric sclera; dry MM  Neck: supple  Cardiovascular: +S1/S2, RRR  Respiratory: CTA B/L; no W/R/R  Gastrointestinal: soft, NT/ND; +BSx4   Extremities: WWP; no edema, clubbing or cyanosis   Vascular: 2+ radial, DP/PT pulses B/L  Neurological:; no focal deficits, no tone in all 4 extremities, not withdrawing to pain or noxious stimuli. Downgoing babinski. Gag reflex present.         VITAL SIGNS:  Vital Signs Last 24 Hrs  T(C): 37.6 (28 Jan 2022 17:20), Max: 37.6 (28 Jan 2022 17:20)  T(F): 99.6 (28 Jan 2022 17:20), Max: 99.6 (28 Jan 2022 17:20)  HR: 84 (28 Jan 2022 17:20) (82 - 100)  BP: 133/72 (28 Jan 2022 17:20) (94/62 - 133/72)  BP(mean): --  RR: 18 (28 Jan 2022 17:20) (18 - 19)  SpO2: 98% (28 Jan 2022 17:20) (88% - 98%)      MEDICATIONS:  MEDICATIONS  (STANDING):  atorvastatin 40 milliGRAM(s) Oral at bedtime  finasteride 5 milliGRAM(s) Oral daily  lidocaine   4% Patch 1 Patch Transdermal daily  melatonin 10 milliGRAM(s) Oral at bedtime  multivitamin 1 Tablet(s) Oral daily  piperacillin/tazobactam IVPB.. 3.375 Gram(s) IV Intermittent every 6 hours  polyethylene glycol 3350 17 Gram(s) Oral two times a day  senna 2 Tablet(s) Oral at bedtime  tamsulosin 0.4 milliGRAM(s) Oral at bedtime    MEDICATIONS  (PRN):  acetaminophen     Tablet .. 650 milliGRAM(s) Oral every 6 hours PRN Mild Pain (1 - 3), Moderate Pain (4 - 6)      ALLERGIES:  Allergies    No Known Allergies    Intolerances        LABS:                        12.1   20.36 )-----------( 248      ( 28 Jan 2022 17:49 )             37.7     01-28    140  |  104  |  24<H>  ----------------------------<  106<H>  3.5   |  25  |  1.38<H>    Ca    9.2      28 Jan 2022 17:49  Phos  2.6     01-28  Mg     2.3     01-28    TPro  7.0  /  Alb  2.5<L>  /  TBili  0.6  /  DBili  x   /  AST  45<H>  /  ALT  32  /  AlkPhos  182<H>  01-28    PT/INR - ( 28 Jan 2022 17:49 )   PT: 14.0 sec;   INR: 1.18          PTT - ( 28 Jan 2022 17:49 )  PTT:34.5 sec    CAPILLARY BLOOD GLUCOSE      POCT Blood Glucose.: 93 mg/dL (28 Jan 2022 17:15)      RADIOLOGY & ADDITIONAL TESTS: Reviewed.

## 2022-01-28 NOTE — PROGRESS NOTE ADULT - SUBJECTIVE AND OBJECTIVE BOX
***TRANSFER ACCEPTANCE NOTE [facility] TO MICU****    Hospital Course:  85 yo M w/ PMH CHF (Unknown EF), HTN, CKD (baseline Cr 1.3), BPH presented from [facility] w/ right-sided CP and back bharat along with generalized weakness/poor PO intake, found with sepsis i/s/o COVID PNA along with RLL CAP - downtrending WBC and inflammatory markers, as well as mixed MURRAY, course c/b urinary retention, s/p waite placement, course c/b delirium. Patient pending discharge but then became unresponsive stroke code called, ICU consult then consulted for concern of inability to protect airway.     OVERNIGHT EVENTS/INTERVAL HPI: Patient became unresponsive when about to be d/c. Stroke code called. CTH, CTA negative. CT C/A/P with Large right lower lobe consolidation consistent with lobar pneumonia, small to moderate-sized partially loculated right pleural effusion with pleural thickening suspicious for parapneumonic effusion/empyema, bladder wall thickening, and markedly enlarged prostate. Patient now back at baseline, no acute complaints.    OBJECTIVE:  Vital Signs Last 24 Hrs  T(C): 37.6 (28 Jan 2022 17:20), Max: 37.6 (28 Jan 2022 17:20)  T(F): 99.6 (28 Jan 2022 17:20), Max: 99.6 (28 Jan 2022 17:20)  HR: 84 (28 Jan 2022 17:20) (82 - 100)  BP: 133/72 (28 Jan 2022 17:20) (94/62 - 133/72)  BP(mean): --  RR: 18 (28 Jan 2022 17:20) (18 - 19)  SpO2: 98% (28 Jan 2022 17:20) (88% - 98%)  I&O's Detail    27 Jan 2022 07:01  -  28 Jan 2022 07:00  --------------------------------------------------------  IN:  Total IN: 0 mL    OUT:    Intermittent Catheterization - Urethral (mL): 600 mL  Total OUT: 600 mL    Total NET: -600 mL        Physical Exam:  GENERAL: Awake, alert and interactive, no acute distress  NEURO: A&Ox4, no focal deficits, 5/5 strength in all ext, reflexes 2+ throughout  HEENT: Normocephalic, atraumatic, CN2-12 intact, no conjunctivitis or scleral icterus, oral mucosa moist, no oral lesions noted  NECK: Supple, no JVD, no LAD, thyroid not palpable  CARDIAC: Regular rate and rhythm, +S1/S2, no murmurs/rubs/gallops  PULM: Breathing comfortably on RA, clear to auscultation bilaterally, no wheezes/rales/rhonchi  ABDOMEN: Soft, nontender, nondistended, +bs, no hepatosplenomegaly, no rebound tenderness or fluid wave, no CVA tenderness  : Deferred  MSK: Range of motion grossly intact  SKIN: Warm and dry, no rashes, no lesions  VASC: 2+ peripheral pulses, no edema, no LE tenderness  Psych: Appropriate affect    Medications:  MEDICATIONS  (STANDING):  atorvastatin 40 milliGRAM(s) Oral at bedtime  finasteride 5 milliGRAM(s) Oral daily  lidocaine   4% Patch 1 Patch Transdermal daily  melatonin 10 milliGRAM(s) Oral at bedtime  multivitamin 1 Tablet(s) Oral daily  piperacillin/tazobactam IVPB.. 3.375 Gram(s) IV Intermittent every 6 hours  polyethylene glycol 3350 17 Gram(s) Oral two times a day  senna 2 Tablet(s) Oral at bedtime  tamsulosin 0.4 milliGRAM(s) Oral at bedtime    MEDICATIONS  (PRN):  acetaminophen     Tablet .. 650 milliGRAM(s) Oral every 6 hours PRN Mild Pain (1 - 3), Moderate Pain (4 - 6)      Labs:                        12.1   20.36 )-----------( 248      ( 28 Jan 2022 17:49 )             37.7     01-28    140  |  104  |  24<H>  ----------------------------<  106<H>  3.5   |  25  |  1.38<H>    Ca    9.2      28 Jan 2022 17:49  Phos  2.6     01-28  Mg     2.3     01-28    TPro  7.0  /  Alb  2.5<L>  /  TBili  0.6  /  DBili  x   /  AST  45<H>  /  ALT  32  /  AlkPhos  182<H>  01-28    PT/INR - ( 28 Jan 2022 17:49 )   PT: 14.0 sec;   INR: 1.18          PTT - ( 28 Jan 2022 17:49 )  PTT:34.5 sec    COVID-19 PCR: Detected (22 Jan 2022 18:25)      Radiology: Reviewed   ***TRANSFER ACCEPTANCE NOTE [facility] TO MICU****    Hospital Course:  85 yo M w/ PMH CHF (Unknown EF), HTN, CKD (baseline Cr 1.3), BPH presented from [facility] w/ right-sided CP and back bharat along with generalized weakness/poor PO intake, found with sepsis i/s/o COVID PNA along with RLL CAP - downtrending WBC and inflammatory markers, as well as mixed MURRAY, course c/b urinary retention, s/p waite placement, course c/b delirium. Patient pending discharge but then became unresponsive stroke code called, ICU consult then consulted for concern of inability to protect airway.     OVERNIGHT EVENTS/INTERVAL HPI: Patient became unresponsive when about to be d/c. Stroke code called. CTH, CTA negative. CT C/A/P with Large right lower lobe consolidation consistent with lobar pneumonia, small to moderate-sized partially loculated right pleural effusion with pleural thickening suspicious for parapneumonic effusion/empyema, bladder wall thickening, and markedly enlarged prostate. Patient now back at baseline, no acute complaints.    OBJECTIVE:  Vital Signs Last 24 Hrs  T(C): 37.6 (28 Jan 2022 17:20), Max: 37.6 (28 Jan 2022 17:20)  T(F): 99.6 (28 Jan 2022 17:20), Max: 99.6 (28 Jan 2022 17:20)  HR: 84 (28 Jan 2022 17:20) (82 - 100)  BP: 133/72 (28 Jan 2022 17:20) (94/62 - 133/72)  BP(mean): --  RR: 18 (28 Jan 2022 17:20) (18 - 19)  SpO2: 98% (28 Jan 2022 17:20) (88% - 98%)  I&O's Detail    27 Jan 2022 07:01  -  28 Jan 2022 07:00  --------------------------------------------------------  IN:  Total IN: 0 mL    OUT:    Intermittent Catheterization - Urethral (mL): 600 mL  Total OUT: 600 mL    Total NET: -600 mL        Physical Exam: -----    Medications:  MEDICATIONS  (STANDING):  atorvastatin 40 milliGRAM(s) Oral at bedtime  finasteride 5 milliGRAM(s) Oral daily  lidocaine   4% Patch 1 Patch Transdermal daily  melatonin 10 milliGRAM(s) Oral at bedtime  multivitamin 1 Tablet(s) Oral daily  piperacillin/tazobactam IVPB.. 3.375 Gram(s) IV Intermittent every 6 hours  polyethylene glycol 3350 17 Gram(s) Oral two times a day  senna 2 Tablet(s) Oral at bedtime  tamsulosin 0.4 milliGRAM(s) Oral at bedtime    MEDICATIONS  (PRN):  acetaminophen     Tablet .. 650 milliGRAM(s) Oral every 6 hours PRN Mild Pain (1 - 3), Moderate Pain (4 - 6)      Labs:                        12.1   20.36 )-----------( 248      ( 28 Jan 2022 17:49 )             37.7     01-28    140  |  104  |  24<H>  ----------------------------<  106<H>  3.5   |  25  |  1.38<H>    Ca    9.2      28 Jan 2022 17:49  Phos  2.6     01-28  Mg     2.3     01-28    TPro  7.0  /  Alb  2.5<L>  /  TBili  0.6  /  DBili  x   /  AST  45<H>  /  ALT  32  /  AlkPhos  182<H>  01-28    PT/INR - ( 28 Jan 2022 17:49 )   PT: 14.0 sec;   INR: 1.18          PTT - ( 28 Jan 2022 17:49 )  PTT:34.5 sec    COVID-19 PCR: Detected (22 Jan 2022 18:25)      Radiology: Reviewed   ***TRANSFER ACCEPTANCE NOTE [facility] TO MICU****    Hospital Course:  87 yo M w/ PMH CHF (Unknown EF), HTN, CKD (baseline Cr 1.3), BPH presented from [facility] w/ right-sided CP and back bharat along with generalized weakness/poor PO intake, found with sepsis i/s/o COVID PNA along with RLL CAP - downtrending WBC and inflammatory markers, as well as mixed MURRAY, course c/b urinary retention, s/p waite placement, course c/b delirium. Patient pending discharge but then became unresponsive stroke code called, ICU consult then consulted for concern of inability to protect airway.     OVERNIGHT EVENTS/INTERVAL HPI: Patient became unresponsive when about to be d/c. Stroke code called. CTH, CTA negative. CT C/A/P with Large right lower lobe consolidation consistent with lobar pneumonia, small to moderate-sized partially loculated right pleural effusion with pleural thickening suspicious for parapneumonic effusion/empyema, bladder wall thickening, and markedly enlarged prostate. Patient now back at baseline, no acute complaints.    OBJECTIVE:  Vital Signs Last 24 Hrs  T(C): 37.6 (28 Jan 2022 17:20), Max: 37.6 (28 Jan 2022 17:20)  T(F): 99.6 (28 Jan 2022 17:20), Max: 99.6 (28 Jan 2022 17:20)  HR: 84 (28 Jan 2022 17:20) (82 - 100)  BP: 133/72 (28 Jan 2022 17:20) (94/62 - 133/72)  BP(mean): --  RR: 18 (28 Jan 2022 17:20) (18 - 19)  SpO2: 98% (28 Jan 2022 17:20) (88% - 98%)  I&O's Detail    27 Jan 2022 07:01  -  28 Jan 2022 07:00  --------------------------------------------------------  IN:  Total IN: 0 mL    OUT:    Intermittent Catheterization - Urethral (mL): 600 mL  Total OUT: 600 mL    Total NET: -600 mL        PHYSICAL EXAM:  General: thin, catectic  HEENT: NC/AT; PERRL, MMM  Neck: supple  Cardiovascular: +S1/S2; RRR  Respiratory: CTA B/L; no W/R/R  Gastrointestinal: soft, NT/ND  Extremities: WWP; stage 2 sacral ulcer  Vascular: 2+ radial, DP/PT pulses B/L  Neurological: AAOx2; drowsy lethargic but appropriate responses    Medications:  MEDICATIONS  (STANDING):  atorvastatin 40 milliGRAM(s) Oral at bedtime  finasteride 5 milliGRAM(s) Oral daily  lidocaine   4% Patch 1 Patch Transdermal daily  melatonin 10 milliGRAM(s) Oral at bedtime  multivitamin 1 Tablet(s) Oral daily  piperacillin/tazobactam IVPB.. 3.375 Gram(s) IV Intermittent every 6 hours  polyethylene glycol 3350 17 Gram(s) Oral two times a day  senna 2 Tablet(s) Oral at bedtime  tamsulosin 0.4 milliGRAM(s) Oral at bedtime    MEDICATIONS  (PRN):  acetaminophen     Tablet .. 650 milliGRAM(s) Oral every 6 hours PRN Mild Pain (1 - 3), Moderate Pain (4 - 6)      Labs:                        12.1   20.36 )-----------( 248      ( 28 Jan 2022 17:49 )             37.7     01-28    140  |  104  |  24<H>  ----------------------------<  106<H>  3.5   |  25  |  1.38<H>    Ca    9.2      28 Jan 2022 17:49  Phos  2.6     01-28  Mg     2.3     01-28    TPro  7.0  /  Alb  2.5<L>  /  TBili  0.6  /  DBili  x   /  AST  45<H>  /  ALT  32  /  AlkPhos  182<H>  01-28    PT/INR - ( 28 Jan 2022 17:49 )   PT: 14.0 sec;   INR: 1.18          PTT - ( 28 Jan 2022 17:49 )  PTT:34.5 sec    COVID-19 PCR: Detected (22 Jan 2022 18:25)      Radiology: Reviewed

## 2022-01-28 NOTE — CONSULT NOTE ADULT - SUBJECTIVE AND OBJECTIVE BOX
**STROKE CODE CONSULT NOTE**    Last known well time/Time of onset of symptoms: LKW 0900 earliest    HPI: 85 yo M w/ PMH CHF (Unknown EF), HTN, CKD (baseline Cr 1.3), BPH [facility] w/ right-sided CP and back pain along with generalized weakness/poor PO intake, found with sepsis i/s/o COVID PNA along with RLL CAP - downtrending WBC and inflammatory markers, as well as mixed MURRAY, course c/b urinary retention, s/p waite placement, course c/b delirium. Stroke code called for acute change in mental status. Pt was about to be discharged to nursing home when nurse notice that pt was obtunded and not responding to verbal stimuli. Called Resident whom noted pt to be non-responsive to noxious or verbal stimuli, regular respirations, non-toxic appearing. Stroke code called for acute change in mental status. Pt in AM was A&Ox3 and participating in exam. Per primary team pt did have episodes of agitation and delirium. On initial exam pt non-responsive, not responding to deep noxious, trace movement of b/l toes, fixed gaze, pinpoint pupils. NIHSS 24.  Xol monitor attached which showed again HR 80s, BP 100s-120s SBP, O2 90s on Room air improve to 95-99% with nasal canula. Concern for if pt can protect airway, initially did not have gag. On repeat evaluation, gag reflex present. Pt brought down to CT. While in CT scanner, pt noted to move around more. CT imaging negative for acute pathology. Primary team obtained CT C/A/P which showed Large right lower lobe consolidation consistent with lobar pneumonia, small to moderate-sized partially loculated right pleural effusion with pleural thickening suspicious for parapneumonic effusion/empyema.     T(C): 36.6 (01-28-22 @ 22:31), Max: 37.6 (01-28-22 @ 17:20)  HR: 79 (01-28-22 @ 23:00) (76 - 97)  BP: 100/63 (01-28-22 @ 23:00) (94/62 - 133/72)  RR: 28 (01-28-22 @ 23:00) (18 - 34)  SpO2: 94% (01-28-22 @ 23:00) (93% - 100%)    PAST MEDICAL & SURGICAL HISTORY:  BPH (benign prostatic hyperplasia)    HLD (hyperlipidemia)    PVD (peripheral vascular disease)    CKD (chronic kidney disease)    Heart failure    H/O hernia repair        FAMILY HISTORY:      ROS:   Unable to obtain     MEDICATIONS  (STANDING):  atorvastatin 40 milliGRAM(s) Oral at bedtime  enoxaparin Injectable 25 milliGRAM(s) SubCutaneous every 12 hours  finasteride 5 milliGRAM(s) Oral daily  lidocaine   4% Patch 1 Patch Transdermal daily  melatonin 10 milliGRAM(s) Oral at bedtime  multivitamin 1 Tablet(s) Oral daily  piperacillin/tazobactam IVPB.. 3.375 Gram(s) IV Intermittent every 6 hours  polyethylene glycol 3350 17 Gram(s) Oral two times a day  senna 2 Tablet(s) Oral at bedtime  tamsulosin 0.4 milliGRAM(s) Oral at bedtime    MEDICATIONS  (PRN):  acetaminophen     Tablet .. 650 milliGRAM(s) Oral every 6 hours PRN Mild Pain (1 - 3), Moderate Pain (4 - 6)    Allergies    No Known Allergies    Intolerances      Vital Signs Last 24 Hrs  T(C): 36.6 (28 Jan 2022 22:31), Max: 37.6 (28 Jan 2022 17:20)  T(F): 97.8 (28 Jan 2022 22:31), Max: 99.6 (28 Jan 2022 17:20)  HR: 79 (28 Jan 2022 23:00) (76 - 97)  BP: 100/63 (28 Jan 2022 23:00) (94/62 - 133/72)  BP(mean): 76 (28 Jan 2022 23:00) (74 - 80)  RR: 28 (28 Jan 2022 23:00) (18 - 34)  SpO2: 94% (28 Jan 2022 23:00) (93% - 100%)    Physical exam:    Neurologic:  -Mental status: Unresponsive to noxious in x4 extremities, sternal rub, not following commands, A&Ox0  -Cranial nerves:   II: BTT not intact   III, IV, VI: Midline gaze. Pinpoint pupils b/l equally   VII: Face is symmetric with normal eye closure and smile  Motor: Stiff lower extremities. Trace movement of b/l toes, no spontaneous movement x 4  Sensation: No grimace or withdrawal from noxious b/l   Reflexes: Downgoing toes bilaterally     NIHSS: 24     Fingerstick Blood Glucose: CAPILLARY BLOOD GLUCOSE      POCT Blood Glucose.: 93 mg/dL (28 Jan 2022 17:15)    LABS:                        12.1   20.36 )-----------( 248      ( 28 Jan 2022 17:49 )             37.7     01-28    140  |  104  |  24<H>  ----------------------------<  106<H>  3.5   |  25  |  1.38<H>    Ca    9.2      28 Jan 2022 17:49  Phos  2.6     01-28  Mg     2.3     01-28    TPro  7.0  /  Alb  2.5<L>  /  TBili  0.6  /  DBili  x   /  AST  45<H>  /  ALT  32  /  AlkPhos  182<H>  01-28    PT/INR - ( 28 Jan 2022 17:49 )   PT: 14.0 sec;   INR: 1.18          PTT - ( 28 Jan 2022 17:49 )  PTT:34.5 sec      RADIOLOGY & ADDITIONAL STUDIES:    < from: CT Brain Stroke Protocol (01.28.22 @ 18:33) >  IMPRESSION:    No acute intracranial hemorrhage, mass effect, or CT evidence of recent   transcortical infarction.    < end of copied text >    < from: CT Perfusion w/ Maps w/ IV Cont (01.28.22 @ 18:35) >  IMPRESSION:    No intracranial arterial occlusion or high-grade stenosis.    Limited CT perfusion study. No gross territorial deficit.    < end of copied text >  < from: CTA Neck (01.28.22 @ 18:35) >  IMPRESSION:    Atherosclerotic plaque of both carotid bifurcations and internal carotid   artery origins, more notable on the left with a mild, approximately 40%   stenosis. No significant right ICA or bilateral vertebral artery stenosis.    < end of copied text >      -----------------------------------------------------------------------------------------------------------------  IV-tPA (Y/N):    ***                              Bolus time:    Alteplase Dose Verification w/ RN:  Reason IV-tPA not given: ***

## 2022-01-28 NOTE — CONSULT NOTE ADULT - ASSESSMENT
85 yo M w/ PMH CHF (Unknown EF), HTN, CKD (baseline Cr 1.3), BPH presented [facility] w/ right-sided CP and back bharat along with generalized weakness/poor PO intake, found with sepsis COVID and MRSA PNA - downtrending WBC and inflammatory markers, as well as mixed MURRAY, course c/b urinary retention and delirium. Patient pending discharge but then became unresponsive stroke code called NIHSS 24, CT imaging negative for acute pathology. CT C/A/P showed Large right lower lobe consolidation consistent with lobar pneumonia, small to moderate-sized partially loculated right pleural effusion with pleural thickening suspicious for parapneumonic effusion/empyema. Pt to be transferred to ICU for concern of inability to protect airway.     Pt most likely experienced seizure episode, very low likelihood of stroke    -Please obtain vEEG  - recommend q4 gen neuro checks    Discussed with neurology attending Dr. Lopez     87 yo M w/ PMH CHF (Unknown EF), HTN, CKD (baseline Cr 1.3), BPH presented from [facility] w/ right-sided CP and back bharat along with generalized weakness/poor PO intake, found with sepsis COVID and MRSA PNA - downtrending WBC and inflammatory markers, as well as mixed MURRAY, course c/b urinary retention and delirium. Patient pending discharge but then became unresponsive stroke code called NIHSS 24, CT imaging negative for acute pathology. CT C/A/P showed Large right lower lobe consolidation consistent with lobar pneumonia, small to moderate-sized partially loculated right pleural effusion with pleural thickening suspicious for parapneumonic effusion/empyema. Pt to be transferred to ICU for concern of inability to protect airway.     Pt most likely experienced seizure episode. Pt returned back to baseline per primary team - very low likelihood of stroke    -Please obtain vEEG  - recommend q4 gen neuro checks    Discussed with neurology attending Dr. Lopez

## 2022-01-28 NOTE — PROVIDER CONTACT NOTE (CHANGE IN STATUS NOTIFICATION) - ACTION/TREATMENT ORDERED:
Pt taken to CT Head Stat on monitor with MD at bedside. Pt transferred to MICU service awaiting report to icu RN for continued care.

## 2022-01-28 NOTE — PROGRESS NOTE ADULT - PROBLEM SELECTOR PLAN 3
on admission with cr 1.59 (baseline cr 1.3) likely in the setting of hypovolemia. Patient with low sBPs 80-90 and reporting decreased PO intake the past few days. Last BM on 1/20. Retaining 525CC on AM bladder scan  Possible 2/2 to poor PO, antibiotics (V/Z), post obstructive given urinary retention (possibly from constipation).   Creatinine down to 1.56 today, was likely obstructive and creatinine downtrending s/p waite  U/S showing R hydro, likely chronic, could be 2/2 obstruction  - Monitor BMs as possible cause of retention  - Trend Cr  - Waite still in place

## 2022-01-28 NOTE — DISCHARGE NOTE NURSING/CASE MANAGEMENT/SOCIAL WORK - NSDCPEFALRISK_GEN_ALL_CORE
For information on Fall & Injury Prevention, visit: [email or web address] OR  [email or web address] OR  https://www.cdc.gov/steadi/patient.html

## 2022-01-28 NOTE — CONSULT NOTE ADULT - SUBJECTIVE AND OBJECTIVE BOX
ICU CONSULT NOTE    CHIEF COMPLAINT: Patient is a 86y old  Male who presents with a chief complaint of sepsis due to pneumonia (28 Jan 2022 10:27)        PAST MEDICAL & SURGICAL HISTORY:  BPH (benign prostatic hyperplasia)  HLD (hyperlipidemia)  PVD (peripheral vascular disease)  CKD (chronic kidney disease)  Heart failure  H/O hernia repair      REVIEW OF SYSTEMS: negative except as stated in HPI.    MEDICATIONS  (STANDING):  atorvastatin 40 milliGRAM(s) Oral at bedtime  enoxaparin Injectable 25 milliGRAM(s) SubCutaneous every 12 hours  finasteride 5 milliGRAM(s) Oral daily  lidocaine   4% Patch 1 Patch Transdermal daily  melatonin 10 milliGRAM(s) Oral at bedtime  multivitamin 1 Tablet(s) Oral daily  piperacillin/tazobactam IVPB.. 3.375 Gram(s) IV Intermittent every 6 hours  polyethylene glycol 3350 17 Gram(s) Oral two times a day  QUEtiapine 25 milliGRAM(s) Oral daily  senna 2 Tablet(s) Oral at bedtime  tamsulosin 0.4 milliGRAM(s) Oral at bedtime    MEDICATIONS  (PRN):  acetaminophen     Tablet .. 650 milliGRAM(s) Oral every 6 hours PRN Mild Pain (1 - 3), Moderate Pain (4 - 6)      Allergies    No Known Allergies    Intolerances        FAMILY HISTORY:      SOCIAL HISTORY:     Vital Signs Last 24 Hrs  T(C): 37.6 (28 Jan 2022 17:20), Max: 37.6 (28 Jan 2022 17:20)  T(F): 99.6 (28 Jan 2022 17:20), Max: 99.6 (28 Jan 2022 17:20)  HR: 84 (28 Jan 2022 17:20) (82 - 100)  BP: 133/72 (28 Jan 2022 17:20) (94/62 - 133/72)  BP(mean): --  RR: 18 (28 Jan 2022 17:20) (18 - 19)  SpO2: 98% (28 Jan 2022 17:20) (88% - 98%)    PHYSICAL EXAM:  GEN: alert, NAD, comfortable in bed  HEENT: PERRL, no relative afferent pupillary defect, EOMI, moist MM, no pharyngeal erythema or exudate  CV: RRR, S1/S2, no murmurs appreciated, no JVD, no carotid bruits  RESP: CTA bilaterally, good respiratory effort, good air movement, no wheezes/rales  ABD: soft, BS+, nontender, nondistended, no guarding/rebound  EXTREMITIES: WWP, pulses 2+ in all 4 extremities, no peripheral edema  MSK: full range of motion of all 4 extremities  SKIN: warm, dry, intact, no rashes  NEURO: A&Ox3, no focal deficits, strength 5/5 throughout, no sensory deficits  PSYC: normal mood/affect    LABS: reviewed.    CAPILLARY BLOOD GLUCOSE      POCT Blood Glucose.: 93 mg/dL (28 Jan 2022 17:15)                          12.1   20.36 )-----------( 248      ( 28 Jan 2022 17:49 )             37.7     01-28    140  |  104  |  24<H>  ----------------------------<  106<H>  3.5   |  25  |  1.38<H>    Ca    9.2      28 Jan 2022 17:49  Phos  2.6     01-28  Mg     2.3     01-28    TPro  7.0  /  Alb  2.5<L>  /  TBili  0.6  /  DBili  x   /  AST  45<H>  /  ALT  32  /  AlkPhos  182<H>  01-28    PT/INR - ( 28 Jan 2022 17:49 )   PT: 14.0 sec;   INR: 1.18          PTT - ( 28 Jan 2022 17:49 )  PTT:34.5 sec  LIVER FUNCTIONS - ( 28 Jan 2022 17:49 )  Alb: 2.5 g/dL / Pro: 7.0 g/dL / ALK PHOS: 182 U/L / ALT: 32 U/L / AST: 45 U/L / GGT: x           ABG - ( 28 Jan 2022 17:43 )  pH, Arterial: 7.45  pH, Blood: x     /  pCO2: 38    /  pO2: 182   / HCO3: 26    / Base Excess: 2.4   /  SaO2: 99.7                        RADIOLOGY AND ADDITIONAL TESTS: reviewed.    Chest XR:  EKG:  Echocardiogram: History:   86M PMH CHF, HTN, CKD (baseline Cr 1.3), BPH  presents to the ED from [facility] rehab with a complaint of pain when breathing and generalized weakness for the past 2 days. He states that when he takes a deep breath he feel pain on the right side of his chest but otherwise denies nay shortness of breath or pain at rest. He says he has been eating less the past few days and feeling more weak. Patient has been a resident of [facility] for years and has no fevers, rhinorrhea, cough, abdominal pain, n/v/d/c, edema. Patient has been vaccinated x2 for COVID.    Interval History: Patient admitted and treated for HAP with vancomycin and zosyn.  He was found to retaining urine and Shepard was ultimately placed.     ICU CONSULT NOTE    CHIEF COMPLAINT: Patient is a 86y old  Male who presents with a chief complaint of sepsis due to pneumonia (28 Jan 2022 10:27).  The patient was about to be discharged when he suddenly become unresponsive according to enhanced supervision person.         PAST MEDICAL & SURGICAL HISTORY:  BPH (benign prostatic hyperplasia)  HLD (hyperlipidemia)  PVD (peripheral vascular disease)  CKD (chronic kidney disease)  Heart failure  H/O hernia repair      REVIEW OF SYSTEMS: negative except as stated in HPI.    MEDICATIONS  (STANDING):  atorvastatin 40 milliGRAM(s) Oral at bedtime  enoxaparin Injectable 25 milliGRAM(s) SubCutaneous every 12 hours  finasteride 5 milliGRAM(s) Oral daily  lidocaine   4% Patch 1 Patch Transdermal daily  melatonin 10 milliGRAM(s) Oral at bedtime  multivitamin 1 Tablet(s) Oral daily  piperacillin/tazobactam IVPB.. 3.375 Gram(s) IV Intermittent every 6 hours  polyethylene glycol 3350 17 Gram(s) Oral two times a day  QUEtiapine 25 milliGRAM(s) Oral daily  senna 2 Tablet(s) Oral at bedtime  tamsulosin 0.4 milliGRAM(s) Oral at bedtime    MEDICATIONS  (PRN):  acetaminophen     Tablet .. 650 milliGRAM(s) Oral every 6 hours PRN Mild Pain (1 - 3), Moderate Pain (4 - 6)      Allergies    No Known Allergies    Intolerances        FAMILY HISTORY:      SOCIAL HISTORY:     Vital Signs Last 24 Hrs  T(C): 37.6 (28 Jan 2022 17:20), Max: 37.6 (28 Jan 2022 17:20)  T(F): 99.6 (28 Jan 2022 17:20), Max: 99.6 (28 Jan 2022 17:20)  HR: 84 (28 Jan 2022 17:20) (82 - 100)  BP: 133/72 (28 Jan 2022 17:20) (94/62 - 133/72)  BP(mean): --  RR: 18 (28 Jan 2022 17:20) (18 - 19)  SpO2: 98% (28 Jan 2022 17:20) (88% - 98%)    PHYSICAL EXAM:  GEN: Lying in able to withdraw from pain after rigorous painful stimuli   HEENT: Pinpoint bilaterally   CV: RRR, S1/S2, no murmurs appreciated, no JVD, no carotid bruits  RESP: Crackles in right lower lobe   ABD: soft, BS+, nontender, nondistended, no guarding/rebound  EXTREMITIES: WWP,, no peripheral edema  MSK: Patient moving toes bilaterally   SKIN: warm, dry, intact, no rashes  NEURO: A&Ox0, moving toes bilaterally   PSYC: unable to assess     LABS: reviewed.    CAPILLARY BLOOD GLUCOSE      POCT Blood Glucose.: 93 mg/dL (28 Jan 2022 17:15)                          12.1   20.36 )-----------( 248      ( 28 Jan 2022 17:49 )             37.7     01-28    140  |  104  |  24<H>  ----------------------------<  106<H>  3.5   |  25  |  1.38<H>    Ca    9.2      28 Jan 2022 17:49  Phos  2.6     01-28  Mg     2.3     01-28    TPro  7.0  /  Alb  2.5<L>  /  TBili  0.6  /  DBili  x   /  AST  45<H>  /  ALT  32  /  AlkPhos  182<H>  01-28    PT/INR - ( 28 Jan 2022 17:49 )   PT: 14.0 sec;   INR: 1.18          PTT - ( 28 Jan 2022 17:49 )  PTT:34.5 sec  LIVER FUNCTIONS - ( 28 Jan 2022 17:49 )  Alb: 2.5 g/dL / Pro: 7.0 g/dL / ALK PHOS: 182 U/L / ALT: 32 U/L / AST: 45 U/L / GGT: x           ABG - ( 28 Jan 2022 17:43 )  pH, Arterial: 7.45  pH, Blood: x     /  pCO2: 38    /  pO2: 182   / HCO3: 26    / Base Excess: 2.4   /  SaO2: 99.7                        RADIOLOGY AND ADDITIONAL TESTS: reviewed.

## 2022-01-28 NOTE — PROGRESS NOTE ADULT - ASSESSMENT
87 yo M w/ PMH CHF (Unknown EF), HTN, CKD (baseline Cr 1.3), BPH [facility] w/ right-sided CP and back bharat along with generalized weakness/poor PO intake, found with sepsis i/s/o COVID PNA along with RLL CAP - downtrending WBC and inflammatory markers, as well as mixed MURRAY, course c/b urinary retention, s/p waite placement, course c/b delirium. Patient pending discharge but then became unresponsive stroke code called, ICU consult then consulted for concern of inability to protect airway.     NEURO:  #Encephalopathy: Patient becoming obtunded prior to discharge.  There was concern for airway protection when patient was assessed however, vitals signs were stable and patient began to move extremities and open eyes. DDX seizure vs cardiac issue. Labs reveal a normal lactate which does not support seizure. Cardiac evaluation showed be pursued for possible embolization.     -CTH and CTA head negative  -Obtain EEG    PULM:  #HAP: Patient with MRSA pneumonia and COVID positive.    -tx as below    CARDIAC:  -No acute issues    RENAL:  #MURRAY on CKD: Creatinine downtrending over course of hospital stay.  Likely multifactorial from decreased po intake from admission, along with urinary retention, and vancomycin use. BPH noted on CTAP 1/28.  -Continue Waite   -Continue to trend creatinine.     ID:  #HAP: Patient with MRSA pneumonia and COVID positive.    -s/p 5 days of Vancomycin treatment and on day 6 of Zosyn treatment.   -Procalcitonin has improved significantly from admission-down from 13.7. Leukocytosis has significantly improved.  Doubt that current infection has caused alteration in mentation.   -Continue Zosyn at 3.375grams q6hrs to complete course on 1/29    MISC:  F:None  E: K>4 and Mg>2  N:NPO till passes dysphagia screen   DVT: heparin SQ  Dispo: [facility]    87 yo M w/ PMH CHF (Unknown EF), HTN, CKD (baseline Cr 1.3), BPH [facility] w/ right-sided CP and back bharat along with generalized weakness/poor PO intake, found with sepsis i/s/o COVID PNA along with RLL CAP - downtrending WBC and inflammatory markers, as well as mixed MURRAY, course c/b urinary retention, s/p waite placement, course c/b delirium. Patient pending discharge but then became unresponsive stroke code called, ICU consult then consulted for concern of inability to protect airway.     NEURO:  #Encephalopathy: Patient becoming obtunded prior to discharge.  There was concern for airway protection when patient was assessed however, vitals signs were stable and patient began to move extremities and open eyes. DDX seizure vs cardiac issue. Labs reveal a normal lactate which does not support seizure. Cardiac evaluation showed be pursued for possible embolization.     -CTH and CTA head negative  -Obtain EEG  -d/c seroquel  -c/w melatonin 10mg qhs    PULM:  #MRSA pneumonia and COVID positive.    -tx as below    CARDIAC:  #CHF (congestive heart failure): Reported charted history of CHF, EF unknown, pt denies h/o CHF and reports normal ECHO. BNP 2006. No JVD, crackles, or LE edema on exam. Cardiac enzymes negative. EKG with no acute ischemic changes. CXR with RLL infiltrate and does not appear to be overload in nature  -caution with fluids    RENAL:  #MURRAY on CKD: On admission with cr 1.59 (baseline cr 1.3) likely in the setting of hypovolemia and retention. Creatinine downtrending over course of hospital stay.  Likely multifactorial from decreased po intake from admission, along with urinary retention, and vancomycin use. BPH noted on CTAP 1/28.  -Continue Waite   -Continue to trend creatinine.     #BPH (benign prostatic hyperplasia):   -Restart home oxybutynin 5mg  -Continued with Finasteride 5 mg qd and tamsulosin 0.4 mg qd    #Urinary Retention: Retained urine during admission despite restarting finasteride and flomax requiring placement of waite catheter  -TOV when possible    ENDO:  #HLD:  -c/w home atorvastatin 40mg    ID:  #HAP: Patient with MRSA pneumonia  -s/p 5 days of Vancomycin treatment and on day 6 of Zosyn treatment.   -Procalcitonin has improved significantly from admission-down from 13.7. Leukocytosis has significantly improved.  Doubt that current infection has caused alteration in mentation.   -Continue Zosyn at 3.375grams q6hrs to complete course on 1/29    #COVID +  -was on RA so did not receive decadron or remdesivir  -d/c on ASA for 30 days    MISC:  F:None  E: K>4 and Mg>2  N:NPO till passes dysphagia screen   DVT: Lovenox q12, d/c on ASA for 30 days  [facility]  [facility]    85 yo M w/ PMH CHF (Unknown EF), HTN, CKD (baseline Cr 1.3), BPH [facility] w/ right-sided CP and back bharat along with generalized weakness/poor PO intake, found with sepsis i/s/o COVID PNA along with RLL CAP - downtrending WBC and inflammatory markers, as well as mixed MURRAY, course c/b urinary retention, s/p waite placement, course c/b delirium. Patient pending discharge but then became unresponsive stroke code called, ICU consult then consulted for concern of inability to protect airway.     NEURO:  #Encephalopathy: Patient becoming obtunded prior to discharge.  There was concern for airway protection when patient was assessed however, vitals signs were stable and patient began to move extremities and open eyes. DDX seizure vs cardiac issue. Labs reveal a normal lactate which does not support seizure. Cardiac evaluation showed be pursued for possible embolization.     -CTH and CTA head negative  -Obtain EEG  -d/c seroquel  -c/w melatonin 10mg qhs    PULM:  #MRSA pneumonia and COVID positive.    -tx as below    CARDIAC:  #CHF (congestive heart failure): Reported charted history of CHF, EF unknown, pt denies h/o CHF and reports normal ECHO. BNP 2006. No JVD, crackles, or LE edema on exam. Cardiac enzymes negative. EKG with no acute ischemic changes. CXR with RLL infiltrate and does not appear to be overload in nature  -caution with fluids    RENAL:  #MURRAY on CKD: On admission with cr 1.59 (baseline cr 1.3) likely in the setting of hypovolemia and retention. Creatinine downtrending over course of hospital stay.  Likely multifactorial from decreased po intake from admission, along with urinary retention, and vancomycin use. BPH noted on CTAP 1/28.  -Continue Waite   -Continue to trend creatinine.     #BPH (benign prostatic hyperplasia):   -Continued with Finasteride 5 mg qd and tamsulosin 0.4 mg qd    #Urinary Retention: Retained urine during admission despite restarting finasteride and flomax requiring placement of waite catheter  -TOV when possible  -Hold home oxybutynin 5mg    ENDO:  #HLD:  -c/w home atorvastatin 40mg    ID:  #HAP: Patient with MRSA pneumonia  -s/p 5 days of Vancomycin treatment and on day 6 of Zosyn treatment.   -Procalcitonin has improved significantly from admission-down from 13.7. Leukocytosis has significantly improved.  Doubt that current infection has caused alteration in mentation.   -Continue Zosyn at 3.375grams q6hrs to complete course on 1/29    #COVID +  -was on RA so did not receive decadron or remdesivir  -d/c on ASA for 30 days    MISC:  F:None  E: K>4 and Mg>2  N:NPO till passes dysphagia screen   DVT: Lovenox q12, d/c on ASA for 30 days  Dispo:  [facility]

## 2022-01-28 NOTE — PROGRESS NOTE ADULT - PROBLEM SELECTOR PLAN 6
history of HLD    - continue home Rosuvastatin 10mg daily (atorvastatin 40mg daily)      #Delirium  Multifactorial i/s/o sepsis, urinary retention and pain  Pt on restraints starting 1/23 which was switched to mittens 1/24 AM and mittens removed 1/25 AM    - do not use restraints/mittens as patient needs to go to [facility] rehab and need 24 hours w/o restraints  - c/w seroquel 25 mg at 7 PM to prevent sundowning  - c/w melatonin 10 mg QHS  - can use zyprexa 5 mg IM if severe agitation or pt can harm self or others.  - psych re-consult      #Right upper extremity infiltration  2/2 to peripheral IV line, switched to left side on 1/25    - f/u [facility] US

## 2022-01-28 NOTE — PROGRESS NOTE ADULT - ASSESSMENT
per Internal Medicine    87 yo M w/ PMH CHF (Unknown EF), HTN, CKD (baseline Cr 1.3), BPH presented from [facility] w/ right-sided CP and back bharat along with generalized weakness/poor PO intake, found with sepsis i/s/o asymptomatic COVID and MRSA PNA - downtrending WBC and inflammatory markers, as well as mixed MURRAY, course c/b urinary retention and delirium     Problem/Plan - 1:  ·  Problem: Sepsis due to pneumonia.   ·  Plan: on admission with BP 87/57, WBC 24.53 with neutrophilic predom 87.6%. Lactate 1.0. CXR RLL infiltrate. UA unremarkable. 94% on room air->97% on 2LNC. Right basilar crackles on exam. COVID +, Procal 0.46  MRSA positive  downtrending WBC but now plateau at 17  SpO2 95% on RA on 1/26/22  CRP downtrending but Ferritin and D dimer increasing  d/c vancomycin on 1/26 given clinical improvement and i/s/o worsening renal function. Low threshold to resume if signs of worsening infection  - c/w zosyn 3.375g IV Q6 (pseudomonal dosing) for 7 days  - Will get CT chest W IV contrast to assess the PNA and r/o PE i/s/o hypoxia and rising D-dimer  - wean O2.    Problem/Plan - 2:  ·  Problem: BPH (benign prostatic hyperplasia).   ·  Plan: History of BPH   #Urinary retention  Urology placed waite catheter on 1/26, with significant UOP since    - hold home oxybutynin 5mg, consider restarting once BPs normalize  - c/w Finasteride 5 mg qd  - c/w tamsulosin 0.4 mg qd  - monitor I&Os.    Problem/Plan - 3:  ·  Problem: Acute kidney injury superimposed on CKD.   ·  Plan: on admission with cr 1.59 (baseline cr 1.3) likely in the setting of hypovolemia. Patient with low sBPs 80-90 and reporting decreased PO intake the past few days. Last BM on 1/20. Retaining 525CC on AM bladder scan  Possible 2/2 to poor PO, antibiotics (V/Z), post obstructive given urinary retention (possibly from constipation).   Creatinine down to 1.56 today, was likely obstructive and creatinine downtrending s/p waite  U/S showing R hydro, likely chronic, could be 2/2 obstruction  - Monitor BMs as possible cause of retention  - Trend Cr  - likely DC w/ waite.    Problem/Plan - 4:  ·  Problem: 2019 novel coronavirus disease (COVID-19).   ·  Plan: on admission with BP 87/57, WBC 24.53 with neutrophilic predom 87.6%. Lactate 1.0. CXR RLL infiltrate. UA unremarkable. 94% on room air->97% on 2LNC. Right basilar crackles on exam. COVID +, Procal 0.46    - No need for treatment with DEX or REM  - monitor if need for supplemental oxygen  - Changed lovenox to 25mg BID per  protocol for COVID pts.    Problem/Plan - 5:  ·  Problem: CHF (congestive heart failure).   ·  Plan: reported charted history of CHF, EF unknown, pt denies h/o CHF and reports normal ECHO. BNP 2006. No JVD, crackles, or LE edema on exam. Cardiac enzymes negative. EKG with no acute ischemic changes. CXR with RLL infiltrate and does not appear to be overload in nature    - does not appear to be in exacerbation at this time > euvolemic.    Problem/Plan - 6:  ·  Problem: HLD (hyperlipidemia).   ·  Plan: history of HLD    - continue home Rosuvastatin 10mg daily (atorvastatin 40mg daily)      #Delirium  Multifactorial i/s/o sepsis, urinary retention and pain  Pt on restraints starting 1/23 which was switched to mittens 1/24 AM and mittens removed 1/25 AM    - do not use restraints/mittens as patient needs to go to [facility] rehab and need 24 hours w/o restraints  - c/w seroquel 25 mg at 7 PM to prevent sundowning  - c/w melatonin 10 mg QHS  - can use zyprexa 5 mg IM if severe agitation or pt can harm self or others.  - psych re-consult      #Right upper extremity infiltration  2/2 to peripheral IV line, switched to left side on 1/25    - f/u RUE US.    Problem/Plan - 7:  ·  Problem: Prophylactic measure.   ·  Plan: F: None  E: Replete K<4, Mg<2  N: DASH/TLC diet  DVT ppx: Lovenox 25mg subq BID  Code: FULL

## 2022-01-28 NOTE — PROGRESS NOTE ADULT - PROBLEM SELECTOR PLAN 1
on admission with BP 87/57, WBC 24.53 with neutrophilic predom 87.6%. Lactate 1.0. CXR RLL infiltrate. UA unremarkable. 94% on room air->97% on 2LNC. Right basilar crackles on exam. COVID +, Procal 0.46  MRSA positive  downtrending WBC but now plateau at 17  SpO2 95% on RA on 1/26/22  CRP downtrending but Ferritin and D dimer increasing  d/c vancomycin on 1/26 given clinical improvement and i/s/o worsening renal function. Low threshold to resume if signs of worsening infection  - c/w zosyn 3.375g IV Q6 (pseudomonal dosing) for 7 days

## 2022-01-28 NOTE — CHART NOTE - NSCHARTNOTEFT_GEN_A_CORE
HPI:  85 yo M w/ PMH CHF (Unknown EF), HTN, CKD (baseline Cr 1.3), BPH presented from [facility] w/ right-sided CP and back bharat along with generalized weakness/poor PO intake, found with sepsis i/s/o COVID PNA along with RLL CAP - downtrending WBC and inflammatory markers, as well as mixed MURRAY, course c/b urinary retention, s/p waite placement, course c/b delirium     Change in Clinical Status Event Note:  Notified by nurse that patient was found to be obtunded and not responding to verbal stimuli. Patient seen and evaluated at bedside. Per EMS, patient was not responding to questions when loaded on to stretcher for transport out of the hospital and thought that this might be the patient's baseline. In hallway, patient was hooked up to a vital monitor which showed HR in 80s, /80s, and 95% on Room Air. On physical exam, patient was non-toxic appearing, non-responsive to noxious or verbal stimuli, with regular respirations. Bedside POCT glucose 96. Stroke code called overhead and patient brought back to room. Xol monitor attached which showed again HR 80s, BP 100s-120s SBP, O2 90s on Room air improve to 95-99% with nasal canula. On physical exam, pupils pinpoint, but reactive to light, airway HPI:  87 yo M w/ PMH CHF (Unknown EF), HTN, CKD (baseline Cr 1.3), BPH presented from [facility] w/ right-sided CP and back bharat along with generalized weakness/poor PO intake, found with sepsis i/s/o COVID PNA along with RLL CAP - downtrending WBC and inflammatory markers, as well as mixed MURRAY, course c/b urinary retention, s/p waite placement, course c/b delirium     Change in Clinical Status Event Note:  Notified by nurse that patient was found to be obtunded and not responding to verbal stimuli. Patient seen and evaluated at bedside. Per EMS, patient was not responding to questions when loaded on to stretcher for transport out of the hospital and thought that this might be the patient's baseline. In hallway, patient was hooked up to a vital monitor which showed HR in 80s, /80s, and 95% on Room Air. On physical exam, patient was non-toxic appearing, non-responsive to noxious or verbal stimuli, with regular respirations. Bedside POCT glucose 96. Stroke code called overhead and patient brought back to room. Xol monitor attached which showed again HR 80s, BP 100s-120s SBP, O2 90s on Room air improve to 95-99% with nasal canula. On physical exam, pupils pinpoint, but reactive to light, airway, regular rate and rhythm, anterior lungs clear, no retractions or accessory muscle use, waite catheter in place. Neuro notable for tone in all 4 extremities, not withdrawing to pain or noxious stimuli. Downgoing babinski. On initial exam, ?gag reflex present. Anesthesia called overhead given concern for airway protection. On repeat evaluation, gag reflex present.  Patient protecting airway. CT studies and stat labs drawn. Lactate normal, abg 7.45/38/26/99% on NC.    Discussed case with stroke, ICU consult, and MICU attending. Decision to continue management in MICU post CT Head.    Differential diagnosis includes neurogenic vs cardiogenic vs infectious  Neuro  r/o Stroke vs Seizure  - f/u CT head and perfusion  - Lactate normal, but cannot exclude seizure with post-ictal state  - Would consider vEEG    Cardiogenic  Hx of CHF, unk EF. No reported hx of arrythmia or CAD.  EKG on 1/24: sinus tachycardia with PVCS  - cannot exclude arrythmia, repeat ECG    Pulm/ID  Known RLL confirmed on CT Angio PE.  Completed 7 day course of Zosyn and originally MRSA positive received 4 days of Vancomycin, renally adjusted for MURRAY before discontinuation in favor of CAP coverage.  - Afebrile on rectal without increase in PNA symptoms or oxygen requirements, downtrending of WBC count from 27 to 19.  - Would consider addition of vancomycin and zosyn for longer course to cover for HAP  - Cannot exclude aspiration event, formal speech and swallow evaluation.  - f/u repeat CT chest

## 2022-01-28 NOTE — PROGRESS NOTE ADULT - SUBJECTIVE AND OBJECTIVE BOX
Physical Medicine and Rehabilitation Progress Note:    Patient is a 86y old  Male who presents with a chief complaint of sepsis due to pneumonia (27 Jan 2022 09:14)      HPI:  86M PMH CHF, HTN, CKD (baseline Cr 1.3), BPH  presents to the ED from [facility] with a complaint of pain when breathing and generalized weakness for the past 2 days. He states that when he takes a deep breath he feel pain on the right side of his chest but otherwise denies nay shortness of breath or pain at rest. He says he has been eating less the past few days and feeling more weak. Patient has been a resident of [facility] for years and has no fevers, rhinorrhea, cough, abdominal pain, n/v/d/c, edema. Patient has been vaccinated x2 for COVID. He is full code.    ED Course:   Vitals: T 98.6F (rectal), HR 86, BP 98/54, RR 18, O2 94% on RA  Labs: WBC 24.53, Neutrophils 87.6%, hb 11.7, plts 166, Lactate 1.0, Na 136, K 4.0, Cl 103, CO2 22, AG 13, BUN/Cr 26/1.59 (cr 1.3 baseline), glucose 122, trops 0.01, BNP 2006, procal 0.46, UA negative, COVID+  EKG: NSR HR 73, no acute ischemic changes, qtc 416  Imaging: CXR RLL infiltrate  Interventions: Vancomycin 1g IV and Zosyn 3.375g IV, (reported 500cc bolus before arrival)   (22 Jan 2022 22:27)                            11.8   17.34 )-----------( 222      ( 27 Jan 2022 06:14 )             37.3       01-27    139  |  102  |  23  ----------------------------<  125<H>  3.3<L>   |  26  |  1.56<H>    Ca    9.1      27 Jan 2022 06:14  Phos  2.9     01-27  Mg     2.4     01-27    TPro  6.4  /  Alb  2.5<L>  /  TBili  0.8  /  DBili  x   /  AST  31  /  ALT  22  /  AlkPhos  140<H>  01-27    Vital Signs Last 24 Hrs  T(C): 36.7 (28 Jan 2022 05:16), Max: 37.4 (27 Jan 2022 14:00)  T(F): 98.1 (28 Jan 2022 05:16), Max: 99.3 (27 Jan 2022 14:00)  HR: 97 (28 Jan 2022 05:16) (91 - 100)  BP: 103/58 (28 Jan 2022 05:16) (98/61 - 110/81)  BP(mean): --  RR: 18 (28 Jan 2022 05:16) (18 - 18)  SpO2: 93% (28 Jan 2022 05:16) (88% - 93%)    MEDICATIONS  (STANDING):  atorvastatin 40 milliGRAM(s) Oral at bedtime  enoxaparin Injectable 25 milliGRAM(s) SubCutaneous every 12 hours  finasteride 5 milliGRAM(s) Oral daily  lidocaine   4% Patch 1 Patch Transdermal daily  melatonin 10 milliGRAM(s) Oral at bedtime  multivitamin 1 Tablet(s) Oral daily  piperacillin/tazobactam IVPB.. 3.375 Gram(s) IV Intermittent every 6 hours  polyethylene glycol 3350 17 Gram(s) Oral two times a day  QUEtiapine 25 milliGRAM(s) Oral daily  senna 2 Tablet(s) Oral at bedtime  tamsulosin 0.4 milliGRAM(s) Oral at bedtime    MEDICATIONS  (PRN):  acetaminophen     Tablet .. 650 milliGRAM(s) Oral every 6 hours PRN Mild Pain (1 - 3), Moderate Pain (4 - 6)    Currently Undergoing Physical/ Occupational Therapy at bedside.    Functional Status Assessment:   1/27/2022      Cognitive/Neuro/Behavioral  Level of Consciousness: confused  Arousal Level: arouses to voice  Orientation: disoriented to;  place;  time;  situation  Speech: illogical  Mood/Behavior: cooperative;  behavior appropriate to situation    Language Assistance  Preferred Language to Address Healthcare Preferred Language to Address Healthcare: English    Therapeutic Interventions      Bed Mobility  Bed Mobility Training Rolling/Turning: minimum assist (75% patient effort);  1 person assist  Bed Mobility Training Scooting: contact guard;  bed rails  Bed Mobility Training Sit-to-Supine: minimum assist (75% patient effort);  1 person assist  Bed Mobility Training Supine-to-Sit: minimum assist (75% patient effort);  2 person assist  Bed Mobility Training Limitations: decreased ability to use legs for bridging/pushing;  decreased ability to use arms for pushing/pulling;  impaired ability to control trunk for mobility;  decreased strength;  impaired balance;  impaired postural control;  cognitive, decreased safety awareness    Sit-Stand Transfer Training  Transfer Training Sit-to-Stand Transfer: minimum assist (75% patient effort);  2 person assist;  2 person HHA  Transfer Training Stand-to-Sit Transfer: minimum assist (75% patient effort);  2 person assist;  2 person HHA  Sit-to-Stand Transfer Training Transfer Safety Analysis: decreased balance;  decreased sequencing ability;  decreased weight-shifting ability;  impaired balance;  decreased strength;  impaired postural control;  cognitive, decreased safety awareness    Gait Training  Gait Training: moderate assist (50% patient effort);  2 person assist;  2 person HHA;  5 R side steps, 5 L side steps  Gait Analysis: decreased ethan;  increased time in double stance;  crouch;  retropulsion;  decreased trunk rotation;  decreased toe clearance;  decreased weight-shifting ability;  Pt w/ poor weight shifting abilities, retropulsive. VC and TC given to erect standing posture. Dec step length/ethan noted. Good aerobic endurance noted, no SOB/coughs observed post-amb.;  decreased strength;  impaired balance;  impaired postural control;  cognitive, decreased safety awareness    Therapeutic Exercise  Therapeutic Exercise Detail: Seated LAQ 1x10, Seated Hip Marches 1x10, Standing lateral Weight shifting 1x5           PM&R Impression: as above    Current Disposition Plan Recommendations:    subacute rehab placement

## 2022-01-29 DIAGNOSIS — R41.82 ALTERED MENTAL STATUS, UNSPECIFIED: ICD-10-CM

## 2022-01-29 LAB
ANION GAP SERPL CALC-SCNC: 11 MMOL/L — SIGNIFICANT CHANGE UP (ref 5–17)
BASOPHILS # BLD AUTO: 0 K/UL — SIGNIFICANT CHANGE UP (ref 0–0.2)
BASOPHILS NFR BLD AUTO: 0 % — SIGNIFICANT CHANGE UP (ref 0–2)
BUN SERPL-MCNC: 19 MG/DL — SIGNIFICANT CHANGE UP (ref 7–23)
CALCIUM SERPL-MCNC: 8.8 MG/DL — SIGNIFICANT CHANGE UP (ref 8.4–10.5)
CHLORIDE SERPL-SCNC: 106 MMOL/L — SIGNIFICANT CHANGE UP (ref 96–108)
CO2 SERPL-SCNC: 25 MMOL/L — SIGNIFICANT CHANGE UP (ref 22–31)
CREAT SERPL-MCNC: 1.36 MG/DL — HIGH (ref 0.5–1.3)
ELLIPTOCYTES BLD QL SMEAR: SLIGHT — SIGNIFICANT CHANGE UP
EOSINOPHIL # BLD AUTO: 0.16 K/UL — SIGNIFICANT CHANGE UP (ref 0–0.5)
EOSINOPHIL NFR BLD AUTO: 0.9 % — SIGNIFICANT CHANGE UP (ref 0–6)
GIANT PLATELETS BLD QL SMEAR: PRESENT — SIGNIFICANT CHANGE UP
GLUCOSE SERPL-MCNC: 105 MG/DL — HIGH (ref 70–99)
HCT VFR BLD CALC: 35.4 % — LOW (ref 39–50)
HGB BLD-MCNC: 11.1 G/DL — LOW (ref 13–17)
LYMPHOCYTES # BLD AUTO: 1.22 K/UL — SIGNIFICANT CHANGE UP (ref 1–3.3)
LYMPHOCYTES # BLD AUTO: 6.9 % — LOW (ref 13–44)
MAGNESIUM SERPL-MCNC: 2.4 MG/DL — SIGNIFICANT CHANGE UP (ref 1.6–2.6)
MANUAL SMEAR VERIFICATION: SIGNIFICANT CHANGE UP
MCHC RBC-ENTMCNC: 31.2 PG — SIGNIFICANT CHANGE UP (ref 27–34)
MCHC RBC-ENTMCNC: 31.4 GM/DL — LOW (ref 32–36)
MCV RBC AUTO: 99.4 FL — SIGNIFICANT CHANGE UP (ref 80–100)
MONOCYTES # BLD AUTO: 0.62 K/UL — SIGNIFICANT CHANGE UP (ref 0–0.9)
MONOCYTES NFR BLD AUTO: 3.5 % — SIGNIFICANT CHANGE UP (ref 2–14)
NEUTROPHILS # BLD AUTO: 15.53 K/UL — HIGH (ref 1.8–7.4)
NEUTROPHILS NFR BLD AUTO: 87.8 % — HIGH (ref 43–77)
OVALOCYTES BLD QL SMEAR: SLIGHT — SIGNIFICANT CHANGE UP
PLAT MORPH BLD: ABNORMAL
PLATELET # BLD AUTO: 240 K/UL — SIGNIFICANT CHANGE UP (ref 150–400)
POIKILOCYTOSIS BLD QL AUTO: SLIGHT — SIGNIFICANT CHANGE UP
POLYCHROMASIA BLD QL SMEAR: SLIGHT — SIGNIFICANT CHANGE UP
POTASSIUM SERPL-MCNC: 3.7 MMOL/L — SIGNIFICANT CHANGE UP (ref 3.5–5.3)
POTASSIUM SERPL-SCNC: 3.7 MMOL/L — SIGNIFICANT CHANGE UP (ref 3.5–5.3)
RBC # BLD: 3.56 M/UL — LOW (ref 4.2–5.8)
RBC # FLD: 14.6 % — HIGH (ref 10.3–14.5)
RBC BLD AUTO: ABNORMAL
SODIUM SERPL-SCNC: 142 MMOL/L — SIGNIFICANT CHANGE UP (ref 135–145)
SPHEROCYTES BLD QL SMEAR: SLIGHT — SIGNIFICANT CHANGE UP
VARIANT LYMPHS # BLD: 0.9 % — SIGNIFICANT CHANGE UP (ref 0–6)
WBC # BLD: 17.69 K/UL — HIGH (ref 3.8–10.5)
WBC # FLD AUTO: 17.69 K/UL — HIGH (ref 3.8–10.5)

## 2022-01-29 PROCEDURE — 99233 SBSQ HOSP IP/OBS HIGH 50: CPT | Mod: GC

## 2022-01-29 PROCEDURE — 99233 SBSQ HOSP IP/OBS HIGH 50: CPT

## 2022-01-29 PROCEDURE — 95720 EEG PHY/QHP EA INCR W/VEEG: CPT

## 2022-01-29 RX ORDER — POTASSIUM CHLORIDE 20 MEQ
20 PACKET (EA) ORAL ONCE
Refills: 0 | Status: COMPLETED | OUTPATIENT
Start: 2022-01-29 | End: 2022-01-29

## 2022-01-29 RX ADMIN — ATORVASTATIN CALCIUM 40 MILLIGRAM(S): 80 TABLET, FILM COATED ORAL at 21:22

## 2022-01-29 RX ADMIN — ENOXAPARIN SODIUM 25 MILLIGRAM(S): 100 INJECTION SUBCUTANEOUS at 12:28

## 2022-01-29 RX ADMIN — Medication 20 MILLIEQUIVALENT(S): at 13:15

## 2022-01-29 RX ADMIN — PIPERACILLIN AND TAZOBACTAM 200 GRAM(S): 4; .5 INJECTION, POWDER, LYOPHILIZED, FOR SOLUTION INTRAVENOUS at 00:04

## 2022-01-29 RX ADMIN — PIPERACILLIN AND TAZOBACTAM 200 GRAM(S): 4; .5 INJECTION, POWDER, LYOPHILIZED, FOR SOLUTION INTRAVENOUS at 13:15

## 2022-01-29 RX ADMIN — SENNA PLUS 2 TABLET(S): 8.6 TABLET ORAL at 21:23

## 2022-01-29 RX ADMIN — Medication 1 TABLET(S): at 13:16

## 2022-01-29 RX ADMIN — ENOXAPARIN SODIUM 25 MILLIGRAM(S): 100 INJECTION SUBCUTANEOUS at 21:22

## 2022-01-29 RX ADMIN — TAMSULOSIN HYDROCHLORIDE 0.4 MILLIGRAM(S): 0.4 CAPSULE ORAL at 21:22

## 2022-01-29 RX ADMIN — Medication 10 MILLIGRAM(S): at 21:22

## 2022-01-29 RX ADMIN — PIPERACILLIN AND TAZOBACTAM 200 GRAM(S): 4; .5 INJECTION, POWDER, LYOPHILIZED, FOR SOLUTION INTRAVENOUS at 06:00

## 2022-01-29 RX ADMIN — FINASTERIDE 5 MILLIGRAM(S): 5 TABLET, FILM COATED ORAL at 13:15

## 2022-01-29 NOTE — PROGRESS NOTE ADULT - ATTENDING COMMENTS
Transferred to MICU for monitoring after episode of unresponsiveness but has now improved after transfer. Was pending discharge after being treated for MRSA/MSSA pneumonia; COVID + incidentally but not an active issue. Not stroke as no lateralizing signs. EEG negative. Not hypoxemic and not hypotensive. Has a complex right sided effusion that is complex and is parapneumonic due to likely necrotizing pneumonia secondary to MRSA/MSSA pneumonia; should do an extended course of abx. Floor eligible. Transferred to MICU for monitoring after episode of unresponsiveness but has now improved after transfer. Was pending discharge after being treated for MRSA/MSSA pneumonia; COVID + incidentally but not an active issue. Not stroke as no lateralizing signs. EEG negative. Not hypoxemic and not hypotensive. Has a complex right sided effusion that is complex and is parapneumonic due to likely necrotizing pneumonia secondary to MRSA/MSSA; should do an extended course of abx. Floor eligible.

## 2022-01-29 NOTE — PROGRESS NOTE ADULT - PROBLEM SELECTOR PLAN 4
on admission with BP 87/57, WBC 24.53 with neutrophilic predom 87.6%. Lactate 1.0. CXR RLL infiltrate. UA unremarkable. 94% on room air->97% on 2LNC. Right basilar crackles on exam. COVID +, Procal 0.46  MRSA positive  downtrending WBC but now plateau at 17  SpO2 95% on RA on 1/26/22  CRP downtrending but Ferritin and D dimer increasing  d/c vancomycin on 1/26 given clinical improvement and i/s/o worsening renal function. Low threshold to resume if signs of worsening infection  -completed 7 day course of zosyn

## 2022-01-29 NOTE — PROGRESS NOTE ADULT - PROBLEM SELECTOR PLAN 3
on admission with cr 1.59 (baseline cr 1.3) likely in the setting of hypovolemia. Patient with low sBPs 80-90 and reporting decreased PO intake the past few days. Last BM on 1/20. Retaining 525CC on AM bladder scan  Possible 2/2 to poor PO, antibiotics (V/Z), post obstructive given urinary retention (possibly from constipation).   Creatinine down to 1.36 today, was likely obstructive and creatinine downtrending s/p waite  U/S showing R hydro, likely chronic, could be 2/2 obstruction  - Monitor BMs as possible cause of retention  - Trend Cr  - Waite still in place

## 2022-01-29 NOTE — PROGRESS NOTE ADULT - ASSESSMENT
87 yo M w/ PMH CHF (Unknown EF), HTN, CKD (baseline Cr 1.3), and BPH who presented from [facility] w/ right-sided chest and back pain along with generalized weakness/poor PO intake, found to have sepsis secondary to COVID and MRSA PNA - downtrending WBC and inflammatory markers, as well as mixed MURRAY, course c/b urinary retention and delirium. Patient pending discharge but then became unresponsive; stroke code activated, NIHSS 24. CT imaging negative for acute pathology. CT C/A/P showed large right lower lobe consolidation consistent with lobar pneumonia, small to moderate-sized partially loculated right pleural effusion with pleural thickening suspicious for parapneumonic effusion/empyema. Patient upgraded from RMF to MICU. Patient now back to baseline, with neurologic examination today non-focal. Video EEG with no epileptiform activity. Clinical picture not consistent with stroke etiology, possibly due to focal seizure in setting of infection versus transient toxic metabolic encephalopathy.     **********INCOMPLETE*********      Discussed with neurology attending, Dr. Lopez   87 yo M w/ PMH CHF (Unknown EF), HTN, CKD (baseline Cr 1.3), and BPH who presented from [facility] w/ right-sided chest and back pain along with generalized weakness/poor PO intake, found to have sepsis secondary to COVID and MRSA PNA - downtrending WBC and inflammatory markers, as well as mixed MURRAY, course c/b urinary retention and delirium. Patient pending discharge but then became unresponsive; stroke code activated, NIHSS 24. CT imaging negative for acute pathology. CT C/A/P showed large right lower lobe consolidation consistent with lobar pneumonia, small to moderate-sized partially loculated right pleural effusion with pleural thickening suspicious for parapneumonic effusion/empyema. Patient upgraded from RMF to MICU. Patient now back to baseline, with neurologic examination today non-focal. Video EEG with no epileptiform activity. Clinical picture not consistent with stroke etiology, possibly due to focal seizure in setting of infection versus transient toxic metabolic encephalopathy. Patient stable for step down to regional floor from neurologic perspective.     - Continue Q1H neuro checks while in ICU, can do Q8H general neuro checks once stepped down  - Continue video EEG for now  - DVT ppx with Lovenox and SCDs    Discussed with neurology attending, Dr. Lopez

## 2022-01-29 NOTE — PROGRESS NOTE ADULT - SUBJECTIVE AND OBJECTIVE BOX
Neurology Stroke Progress Note    INTERVAL HPI/OVERNIGHT EVENTS: No acute overnight events. Patient seen and examined this morning,    MEDICATIONS  (STANDING):  atorvastatin 40 milliGRAM(s) Oral at bedtime  enoxaparin Injectable 25 milliGRAM(s) SubCutaneous every 12 hours  finasteride 5 milliGRAM(s) Oral daily  lidocaine   4% Patch 1 Patch Transdermal daily  melatonin 10 milliGRAM(s) Oral at bedtime  multivitamin 1 Tablet(s) Oral daily  piperacillin/tazobactam IVPB.. 3.375 Gram(s) IV Intermittent every 6 hours  polyethylene glycol 3350 17 Gram(s) Oral two times a day  senna 2 Tablet(s) Oral at bedtime  tamsulosin 0.4 milliGRAM(s) Oral at bedtime    MEDICATIONS  (PRN):  acetaminophen     Tablet .. 650 milliGRAM(s) Oral every 6 hours PRN Mild Pain (1 - 3), Moderate Pain (4 - 6)      Allergies    No Known Allergies    Intolerances        Vital Signs Last 24 Hrs  T(C): 36.6 (29 Jan 2022 10:00), Max: 37.6 (28 Jan 2022 17:20)  T(F): 97.8 (29 Jan 2022 10:00), Max: 99.6 (28 Jan 2022 17:20)  HR: 74 (29 Jan 2022 10:01) (70 - 96)  BP: 106/55 (29 Jan 2022 10:01) (83/56 - 133/72)  BP(mean): 76 (29 Jan 2022 10:01) (64 - 83)  RR: 28 (29 Jan 2022 10:01) (16 - 34)  SpO2: 93% (29 Jan 2022 10:01) (90% - 100%)    Physical exam:  General: No acute distress, awake and alert  Eyes: Anicteric sclerae, moist conjunctivae, see below for CNs  Neck: trachea midline, FROM, supple, no thyromegaly or lymphadenopathy  Cardiovascular: Regular rate and rhythm, no murmurs, rubs, or gallops. No carotid bruits.   Pulmonary: Anterior breath sounds clear bilaterally, no crackles or wheezing. No use of accessory muscles  GI: Abdomen soft, non-distended, non-tender  Extremities: Radial and DP pulses +2, no edema    Neurologic:  -Mental status: Awake, alert, oriented to person, place, and time. Speech is fluent with intact naming, repetition, and comprehension, no dysarthria. Recent and remote memory intact. Follows commands. Attention/concentration intact. Fund of knowledge appropriate.  -Cranial nerves:   II: Visual fields are full to confrontation.  III, IV, VI: Extraocular movements are intact without nystagmus. Pupils equally round and reactive to light  V:  Facial sensation V1-V3 equal and intact   VII: Face is symmetric with normal eye closure and smile  VIII: Hearing is bilaterally intact to finger rub  IX, X: Uvula is midline and soft palate rises symmetrically  XI: Head turning and shoulder shrug are intact.  XII: Tongue protrudes midline  Motor: Normal bulk and tone. No pronator drift. Strength bilateral upper extremity 5/5, bilateral lower extremities 5/5.  Rapid alternating movements intact and symmetric  Sensation: Intact to light touch bilaterally. No neglect or extinction on double simultaneous testing.  Coordination: No dysmetria on finger-to-nose and heel-to-shin bilaterally  Reflexes: Downgoing toes bilaterally   Gait: Narrow gait and steady    LABS:                        11.1   17.69 )-----------( 240      ( 29 Jan 2022 05:46 )             35.4     01-29    142  |  106  |  19  ----------------------------<  105<H>  3.7   |  25  |  1.36<H>    Ca    8.8      29 Jan 2022 05:46  Phos  2.6     01-28  Mg     2.4     01-29    TPro  7.0  /  Alb  2.5<L>  /  TBili  0.6  /  DBili  x   /  AST  45<H>  /  ALT  32  /  AlkPhos  182<H>  01-28    PT/INR - ( 28 Jan 2022 17:49 )   PT: 14.0 sec;   INR: 1.18          PTT - ( 28 Jan 2022 17:49 )  PTT:34.5 sec      RADIOLOGY & ADDITIONAL TESTS:     Neurology Stroke Progress Note    INTERVAL HPI/OVERNIGHT EVENTS: No acute overnight events. Patient seen and examined this morning, reports no complaints. Slightly agitated. Reports no headache, blurry vision, extremity weakness, numbness.     MEDICATIONS  (STANDING):  atorvastatin 40 milliGRAM(s) Oral at bedtime  enoxaparin Injectable 25 milliGRAM(s) SubCutaneous every 12 hours  finasteride 5 milliGRAM(s) Oral daily  lidocaine   4% Patch 1 Patch Transdermal daily  melatonin 10 milliGRAM(s) Oral at bedtime  multivitamin 1 Tablet(s) Oral daily  piperacillin/tazobactam IVPB.. 3.375 Gram(s) IV Intermittent every 6 hours  polyethylene glycol 3350 17 Gram(s) Oral two times a day  senna 2 Tablet(s) Oral at bedtime  tamsulosin 0.4 milliGRAM(s) Oral at bedtime    MEDICATIONS  (PRN):  acetaminophen     Tablet .. 650 milliGRAM(s) Oral every 6 hours PRN Mild Pain (1 - 3), Moderate Pain (4 - 6)    Allergies  No Known Allergies    Vital Signs Last 24 Hrs  T(C): 36.6 (29 Jan 2022 10:00), Max: 37.6 (28 Jan 2022 17:20)  T(F): 97.8 (29 Jan 2022 10:00), Max: 99.6 (28 Jan 2022 17:20)  HR: 74 (29 Jan 2022 10:01) (70 - 96)  BP: 106/55 (29 Jan 2022 10:01) (83/56 - 133/72)  BP(mean): 76 (29 Jan 2022 10:01) (64 - 83)  RR: 28 (29 Jan 2022 10:01) (16 - 34)  SpO2: 93% (29 Jan 2022 10:01) (90% - 100%)    Physical exam:  General: No acute distress, awake and alert  Eyes: Anicteric sclerae, moist conjunctivae  Extremities: Radial and DP pulses +2, no edema    Neurologic:  -Mental status: Awake, oriented to person and place, not time (states February 21, 2002). Speech is dysarthric,. Intact naming (simple objects like pen, clock) and repetition.  Follows commands.  Attention/concentration intact. Fund of knowledge appropriate.  -Cranial nerves:   II: Blink to threat intact  III, IV, VI: Extraocular movements are intact without nystagmus. Pupils equally round and reactive to light  V:  Facial sensation V1-V3 equal and intact   VII: Face is symmetric with normal eye closure and smile  VIII: Gross hearing is intact  Motor: Normal bulk and tone. No pronator drift. Able to lift all four extremities antigravity, unwilling to participate in strength testing.  Sensation: Intact to light touch bilaterally.   Coordination: No dysmetria on finger-to-nose bilaterally  Reflexes: Downgoing toes bilaterally     LABS:             11.1   17.69 )-----------( 240      ( 29 Jan 2022 05:46 )             35.4     142  |  106  |  19  ----------------------------<  105<H>  3.7   |  25  |  1.36<H>    Ca    8.8      29 Jan 2022 05:46  Phos  2.6     01-28  Mg     2.4     01-29    TPro  7.0  /  Alb  2.5<L>  /  TBili  0.6  /  DBili  x   /  AST  45<H>  /  ALT  32  /  AlkPhos  182<H>  01-28    PT/INR - ( 28 Jan 2022 17:49 )   PT: 14.0 sec;   INR: 1.18       PTT - ( 28 Jan 2022 17:49 )  PTT:34.5 sec    RADIOLOGY & ADDITIONAL TESTS:    Video EEG Report  Day 1: 1/28/2022 @ 8:06:03 PM to next morning @ 07:00 am  Background: with predominantly alpha/theta/delta frequencies. Intermittent generalized delta activity noted.  Events:  No electrographic seizures or significant clinical events.  Provocations:  Hyperventilation and Photic stimulation: .  Daily Summary:  slowing suggestive of dysfunction. No epileptiform activity and no significant clinical events occurred.

## 2022-01-29 NOTE — PROGRESS NOTE ADULT - SUBJECTIVE AND OBJECTIVE BOX
HOSPITAL COURSE  87 yo M w/ PMH CHF (Unknown EF), HTN, CKD (baseline Cr 1.3), BPH presented from [facility] w/ right-sided CP and back bharat along with generalized weakness/poor PO intake, found with sepsis i/s/o COVID PNA along with RLL CAP - downtrending WBC and inflammatory markers, as well as mixed MURRAY, course c/b urinary retention, s/p waite placement, course c/b delirium. Patient pending discharge but then became unresponsive stroke code called, ICU consult then consulted for concern of inability to protect airway and stroke code called, however patient able to protect airway and CTA head and neck negative. Patient now clinically stable and safe for stepdown to regional for further evaluation and treatment.     SUBJECTIVE / INTERVAL HPI: Patient seen and examined at bedside. Resting comfortably and in no acute distress. Denies pain or shortness of breath.     VITAL SIGNS:  Vital Signs Last 24 Hrs  T(C): 36.6 (29 Jan 2022 10:00), Max: 37.6 (28 Jan 2022 17:20)  T(F): 97.8 (29 Jan 2022 10:00), Max: 99.6 (28 Jan 2022 17:20)  HR: 79 (29 Jan 2022 12:00) (70 - 96)  BP: 100/62 (29 Jan 2022 12:00) (83/56 - 133/72)  BP(mean): 75 (29 Jan 2022 12:00) (64 - 83)  RR: 23 (29 Jan 2022 12:00) (16 - 34)  SpO2: 95% (29 Jan 2022 12:00) (90% - 100%)    PHYSICAL EXAM:  General: thin, catectic  HEENT: NC/AT; PERRL, MMM, on vEEG  Neck: supple, no JVD, on VEEG  Cardiovascular: +S1/S2; RRR  Respiratory: CTA B/L; no W/R/R  Gastrointestinal: soft, NT/ND  Extremities: WWP; stage 2 sacral ulcer  Vascular: 2+ radial, DP/PT pulses B/L  Neurological: AAOx2; drowsy lethargic but appropriate responses    MEDICATIONS:  MEDICATIONS  (STANDING):  atorvastatin 40 milliGRAM(s) Oral at bedtime  enoxaparin Injectable 25 milliGRAM(s) SubCutaneous every 12 hours  finasteride 5 milliGRAM(s) Oral daily  lidocaine   4% Patch 1 Patch Transdermal daily  melatonin 10 milliGRAM(s) Oral at bedtime  multivitamin 1 Tablet(s) Oral daily  piperacillin/tazobactam IVPB.. 3.375 Gram(s) IV Intermittent every 6 hours  polyethylene glycol 3350 17 Gram(s) Oral two times a day  senna 2 Tablet(s) Oral at bedtime  tamsulosin 0.4 milliGRAM(s) Oral at bedtime    MEDICATIONS  (PRN):  acetaminophen     Tablet .. 650 milliGRAM(s) Oral every 6 hours PRN Mild Pain (1 - 3), Moderate Pain (4 - 6)      ALLERGIES:  Allergies    No Known Allergies    Intolerances        LABS:                        11.1   17.69 )-----------( 240      ( 29 Jan 2022 05:46 )             35.4     01-29    142  |  106  |  19  ----------------------------<  105<H>  3.7   |  25  |  1.36<H>    Ca    8.8      29 Jan 2022 05:46  Phos  2.6     01-28  Mg     2.4     01-29    TPro  7.0  /  Alb  2.5<L>  /  TBili  0.6  /  DBili  x   /  AST  45<H>  /  ALT  32  /  AlkPhos  182<H>  01-28    PT/INR - ( 28 Jan 2022 17:49 )   PT: 14.0 sec;   INR: 1.18          PTT - ( 28 Jan 2022 17:49 )  PTT:34.5 sec    CAPILLARY BLOOD GLUCOSE      POCT Blood Glucose.: 93 mg/dL (28 Jan 2022 17:15)      RADIOLOGY & ADDITIONAL TESTS: Reviewed.

## 2022-01-29 NOTE — PROGRESS NOTE ADULT - PROBLEM SELECTOR PLAN 1
Pt found to be unresponsive on 1/28 just prior to planned DC, gag reflex absent. Stroke code called, stepped up to ICU. CTH negative for stroke. DDX includes seizure. Patient now back to baseline A&Ox2-3  -c/w vEEG

## 2022-01-29 NOTE — PROGRESS NOTE ADULT - SUBJECTIVE AND OBJECTIVE BOX
~~stepdown from MICU to [facility]~~  Hospital Course:  87 yo M w/ PMH CHF (Unknown EF), HTN, CKD (baseline Cr 1.3), BPH presented from [facility] w/ right-sided pleuritic and back pain along with generalized weakness/poor PO intake, presented on 1/22/22 and found with sepsis i/s/o COVID PNA, not started on any COVID treatment as patient was not hypoxic. Pt's SBPs in the 80-90s and BPH meds here held. Pt found with RLL infiltrate with WBC 24.5 with neutrophilic predominance. Pt started on vanc and zosyn as MRSA positive. Since then downtrending WBC and inflammatory markers. On night of 1/22/22, patient delirious, bladder scan showed 750cc. Attempted to straight cath but unable. Pt agitated, and given zyprexa 5 mg IM x3. Urology consulted and waite placed, and gradually restarted BPH meds. Waite removed on 1/25, failed TOV, pending Waite replacement. With the patient clinically improving so Vancomycin was d/c'ed. CT chest w/ contrast and RP US were ordered due to persistent inflammatory markers and slow downtrending wbc. RP was notable for mild to severe hydronephrosis of R kidney, possibly chronic and mild hydro of L kidney, bladder thickening compatible w/ lateral outlet obstruction 2/2 BPH. Patient was failing TOV so Waite was reordered. Patient on 1/27 was weaned off O2 and CTangio showing RLL PNA but no PE. Patient on 1/28 was stable for discharge back to [facility] w/ waite in place however while ambulance was getting ready to put patient on the stretcher he was obtunded and difficult to arouse with sternal rub. Stroke code was called. Initial work has been unremarkable. Patient was not intubated and is beginning to become more responsive. He was monitored in MICU overnight and placed on vEEG, quickly returned to baseline and stable for stepdown to [facility]    INTERVAL HPI/OVERNIGHT EVENTS:  Patient was seen and examined at bedside. Patient seen at bedside, back to baseline, no acute complaints.    VITAL SIGNS:  T(F): 97 (01-29-22 @ 14:23)  HR: 76 (01-29-22 @ 15:00)  BP: 95/50 (01-29-22 @ 15:00)  RR: 28 (01-29-22 @ 15:00)  SpO2: 93% (01-29-22 @ 15:00)  Wt(kg): --    PHYSICAL EXAM:  General: Elderly chronic appearing male, frail, A&Ox2  HEENT: NC/AT; pupils reactive, anicteric sclera; dry MM  Neck: supple  Cardiovascular: +S1/S2, RRR  Respiratory: CTA B/L; no W/R/R  Gastrointestinal: soft, NT/ND; +BSx4   Extremities: WWP; no edema, clubbing or cyanosis   Vascular: 2+ radial, DP/PT pulses B/L  Neurological:; no focal deficits, no tone in all 4 extremities, not withdrawing to pain or noxious stimuli. Downgoing babinski. Gag reflex present.     MEDICATIONS  (STANDING):  atorvastatin 40 milliGRAM(s) Oral at bedtime  enoxaparin Injectable 25 milliGRAM(s) SubCutaneous every 12 hours  finasteride 5 milliGRAM(s) Oral daily  lidocaine   4% Patch 1 Patch Transdermal daily  melatonin 10 milliGRAM(s) Oral at bedtime  multivitamin 1 Tablet(s) Oral daily  piperacillin/tazobactam IVPB.. 3.375 Gram(s) IV Intermittent every 6 hours  polyethylene glycol 3350 17 Gram(s) Oral two times a day  senna 2 Tablet(s) Oral at bedtime  tamsulosin 0.4 milliGRAM(s) Oral at bedtime    MEDICATIONS  (PRN):  acetaminophen     Tablet .. 650 milliGRAM(s) Oral every 6 hours PRN Mild Pain (1 - 3), Moderate Pain (4 - 6)      Allergies    No Known Allergies    Intolerances        LABS:                        11.1   17.69 )-----------( 240      ( 29 Jan 2022 05:46 )             35.4     01-29    142  |  106  |  19  ----------------------------<  105<H>  3.7   |  25  |  1.36<H>    Ca    8.8      29 Jan 2022 05:46  Phos  2.6     01-28  Mg     2.4     01-29    TPro  7.0  /  Alb  2.5<L>  /  TBili  0.6  /  DBili  x   /  AST  45<H>  /  ALT  32  /  AlkPhos  182<H>  01-28    PT/INR - ( 28 Jan 2022 17:49 )   PT: 14.0 sec;   INR: 1.18          PTT - ( 28 Jan 2022 17:49 )  PTT:34.5 sec      RADIOLOGY & ADDITIONAL TESTS:  Reviewed   ~~stepdown from MICU to [facility]~~  Hospital Course:  85 yo M w/ PMH CHF (Unknown EF), HTN, CKD (baseline Cr 1.3), BPH presented from [facility] w/ right-sided pleuritic and back pain along with generalized weakness/poor PO intake, presented on 1/22/22 and found with sepsis i/s/o COVID PNA, not started on any COVID treatment as patient was not hypoxic. Pt's SBPs in the 80-90s and BPH meds here held. Pt found with RLL infiltrate with WBC 24.5 with neutrophilic predominance. Pt started on vanc and zosyn as MRSA positive. Since then downtrending WBC and inflammatory markers. On night of 1/22/22, patient delirious, bladder scan showed 750cc. Attempted to straight cath but unable. Pt agitated, and given zyprexa 5 mg IM x3. Urology consulted and waite placed, and gradually restarted BPH meds. Waite removed on 1/25, failed TOV, pending Waite replacement. With the patient clinically improving so Vancomycin was d/c'ed. CT chest w/ contrast and RP US were ordered due to persistent inflammatory markers and slow downtrending wbc. RP was notable for mild to severe hydronephrosis of R kidney, possibly chronic and mild hydro of L kidney, bladder thickening compatible w/ lateral outlet obstruction 2/2 BPH. Patient was failing TOV so Waite was reordered. Patient on 1/27 was weaned off O2 and CTangio showing RLL PNA but no PE. Patient on 1/28 was stable for discharge back to [facility] w/ waite in place however while ambulance was getting ready to put patient on the stretcher he was obtunded and difficult to arouse with sternal rub. Stroke code was called. Initial work has been unremarkable. Patient was not intubated and is beginning to become more responsive. He was monitored in MICU overnight and placed on vEEG, quickly returned to baseline and stable for stepdown to RM[facility]    INTERVAL HPI/OVERNIGHT EVENTS:  Patient was seen and examined at bedside. Patient seen at bedside, back to baseline, no acute complaints.    VITAL SIGNS:  T(F): 97 (01-29-22 @ 14:23)  HR: 76 (01-29-22 @ 15:00)  BP: 95/50 (01-29-22 @ 15:00)  RR: 28 (01-29-22 @ 15:00)  SpO2: 93% (01-29-22 @ 15:00)  Wt(kg): --    PHYSICAL EXAM:  General: Elderly chronic appearing male, frail, A&Ox2  HEENT: NC/AT; pupils reactive, anicteric sclera; dry MM  Neck: supple  Cardiovascular: +S1/S2, RRR  Respiratory: CTA B/L; no W/R/R  Gastrointestinal: soft, NT/ND; +BSx4   Extremities: WWP; no edema, clubbing or cyanosis   Vascular: 2+ radial, DP/PT pulses B/L  Neurological:; no focal deficits, strength back to baseline, Downgoing babinski. Gag reflex present.     MEDICATIONS  (STANDING):  atorvastatin 40 milliGRAM(s) Oral at bedtime  enoxaparin Injectable 25 milliGRAM(s) SubCutaneous every 12 hours  finasteride 5 milliGRAM(s) Oral daily  lidocaine   4% Patch 1 Patch Transdermal daily  melatonin 10 milliGRAM(s) Oral at bedtime  multivitamin 1 Tablet(s) Oral daily  piperacillin/tazobactam IVPB.. 3.375 Gram(s) IV Intermittent every 6 hours  polyethylene glycol 3350 17 Gram(s) Oral two times a day  senna 2 Tablet(s) Oral at bedtime  tamsulosin 0.4 milliGRAM(s) Oral at bedtime    MEDICATIONS  (PRN):  acetaminophen     Tablet .. 650 milliGRAM(s) Oral every 6 hours PRN Mild Pain (1 - 3), Moderate Pain (4 - 6)      Allergies    No Known Allergies    Intolerances        LABS:                        11.1   17.69 )-----------( 240      ( 29 Jan 2022 05:46 )             35.4     01-29    142  |  106  |  19  ----------------------------<  105<H>  3.7   |  25  |  1.36<H>    Ca    8.8      29 Jan 2022 05:46  Phos  2.6     01-28  Mg     2.4     01-29    TPro  7.0  /  Alb  2.5<L>  /  TBili  0.6  /  DBili  x   /  AST  45<H>  /  ALT  32  /  AlkPhos  182<H>  01-28    PT/INR - ( 28 Jan 2022 17:49 )   PT: 14.0 sec;   INR: 1.18          PTT - ( 28 Jan 2022 17:49 )  PTT:34.5 sec      RADIOLOGY & ADDITIONAL TESTS:  Reviewed

## 2022-01-29 NOTE — EEG REPORT - NS EEG TEXT BOX
Day 1: 1/28/2022 @ 8:06:03 PM to next morning @ 07:00 am  Background:  , with predominantly alpha/theta/delta frequencies.  Intermittent generalized delta activity noted.  Symmetry:    Posterior Dominant Rhythm:  8-9hz .  Organization: .  Voltage:    Variability: . 		Reactivity: .  N2 sleep: .  Spontaneous Activity:    Periodic/rhythmic activity:  .  Events:  No electrographic seizures or significant clinical events.  Provocations:  Hyperventilation and Photic stimulation: .    Daily Summary:      slowing suggestive of   dysfunction.  No epileptiform activity and no significant clinical events occurred.    Alin Mcdaniel MD  Attending Neurologist, [facility]

## 2022-01-29 NOTE — PROGRESS NOTE ADULT - ASSESSMENT
87 yo M w/ PMH CHF (Unknown EF), HTN, CKD (baseline Cr 1.3), BPH presented from [facility] w/ right-sided CP and back bharat along with generalized weakness/poor PO intake, found with sepsis i/s/o COVID PNA along with RLL CAP - downtrending WBC and inflammatory markers, as well as mixed MURRAY, course c/b urinary retention, s/p waite placement, course c/b delirium. Patient pending discharge but then became unresponsive stroke code called, and transferred to ICU. Stroke code negative.     NEURO:  #Encephalopathy: Patient becoming obtunded prior to discharge.  There was concern for airway protection when patient was assessed however, vitals signs were stable and patient began to move extremities and open eyes. DDX seizure vs cardiac issue. Labs reveal a normal lactate which does not support seizure. Cardiac evaluation showed be pursued for possible embolization.     -CTH and CTA head negative  -EEG without localized signs, continue to monitor    PULM:  #MRSA pneumonia and COVID positive.    -tx as below    CARDIAC:  #CHF (congestive heart failure): Reported charted history of CHF, EF unknown, pt denies h/o CHF and reports normal ECHO. BNP 2006. No JVD, crackles, or LE edema on exam. Cardiac enzymes negative. EKG with no acute ischemic changes. CXR with RLL infiltrate and does not appear to be overload in nature  -caution with fluids    RENAL:  #MURRAY on CKD: On admission with cr 1.59 (baseline cr 1.3) likely in the setting of hypovolemia and retention. Creatinine downtrending over course of hospital stay.  Likely multifactorial from decreased po intake from admission, along with urinary retention, and vancomycin use. BPH noted on CTAP 1/28.  -Continue Waite, monitor Is/Os  -Continue to trend creatinine.     #BPH (benign prostatic hyperplasia):   Moderately enlarged prostate on CT on admission  - Continued with Finasteride 5 mg qd and tamsulosin 0.4 mg qd  - Continue with waite as this was placed by urology on 1/25, initial plan was for discharge with waite    #Urinary Retention: Retained urine during admission despite restarting finasteride and flomax requiring placement of waite catheter  - Continue with waite  -Hold home oxybutynin 5mg    ENDO:  #HLD:  -c/w home atorvastatin 40mg    ID:  #HAP: Patient with MRSA pneumonia  -s/p 5 days of Vancomycin treatment and on day 6 of Zosyn treatment.   -Procalcitonin has improved significantly from admission-down from 13.7. Leukocytosis has significantly improved.  Doubt that current infection has caused alteration in mentation.   -Continue Zosyn at 3.375grams q6hrs to complete course on 1/29    #COVID +  -was on RA so did not receive decadron or remdesivir  -d/c on ASA for 30 days    MISC:  F:None  E: K>4 and Mg>2  N:NPO till passes dysphagia screen   DVT: Lovenox q12, d/c on ASA for 30 days  [facility]  [facility]      87 yo M w/ PMH CHF (Unknown EF), HTN, CKD (baseline Cr 1.3), BPH presented from [facility] w/ right-sided CP and back bharat along with generalized weakness/poor PO intake, found with sepsis i/s/o COVID PNA along with RLL CAP - downtrending WBC and inflammatory markers, as well as mixed MURRAY, course c/b urinary retention, s/p waite placement, course c/b delirium. Patient pending discharge but then became unresponsive stroke code called, and transferred to ICU. Stroke code negative.     NEURO:  #Encephalopathy: Patient becoming obtunded prior to discharge.  There was concern for airway protection when patient was assessed however, vitals signs were stable and patient began to move extremities and open eyes. DDX seizure vs cardiac issue. Labs reveal a normal lactate which does not support seizure. Cardiac evaluation showed be pursued for possible embolization.     -CTH and CTA head negative  -EEG without localized signs, continue to monitor    PULM:  #MRSA/MSSA pneumonia and COVID positive.    -tx as below    CARDIAC:  #CHF (congestive heart failure): Reported charted history of CHF, EF unknown, pt denies h/o CHF and reports normal ECHO. BNP 2006. No JVD, crackles, or LE edema on exam. Cardiac enzymes negative. EKG with no acute ischemic changes. CXR with RLL infiltrate and does not appear to be overload in nature  -caution with fluids    RENAL:  #MURRAY on CKD: On admission with cr 1.59 (baseline cr 1.3) likely in the setting of hypovolemia and retention. Creatinine downtrending over course of hospital stay.  Likely multifactorial from decreased po intake from admission, along with urinary retention, and vancomycin use. BPH noted on CTAP 1/28.  -Continue Waite, monitor Is/Os  -Continue to trend creatinine.     #BPH (benign prostatic hyperplasia):   Moderately enlarged prostate on CT on admission  - Continued with Finasteride 5 mg qd and tamsulosin 0.4 mg qd  - Continue with waite as this was placed by urology on 1/25, initial plan was for discharge with waite    #Urinary Retention: Retained urine during admission despite restarting finasteride and flomax requiring placement of waite catheter  - Continue with awite  -Hold home oxybutynin 5mg    ENDO:  #HLD:  -c/w home atorvastatin 40mg    ID:  #HAP: Patient with MRSA pneumonia  -s/p 5 days of Vancomycin treatment and on day 6 of Zosyn treatment.   -Procalcitonin has improved significantly from admission-down from 13.7. Leukocytosis has significantly improved.  Doubt that current infection has caused alteration in mentation.   -Continue Zosyn at 3.375grams q6hrs     #COVID +  -was on RA so did not receive decadron or remdesivir  -d/c on ASA for 30 days    MISC:  F:None  E: K>4 and Mg>2  N:NPO till passes dysphagia screen   DVT: Lovenox q12, d/c on ASA for 30 days  Dispo: [facility]

## 2022-01-29 NOTE — PROGRESS NOTE ADULT - ATTENDING COMMENTS
85 yo M w/ PMH CHF (Unknown EF), HTN, CKD (baseline Cr 1.3), BPH presented [facility] w/ right-sided CP and back bharat along with generalized weakness/poor PO intake, found with sepsis i/s/o asymptomatic COVID and MRSA PNA, pt treated and was ready for discharge on 1/28/22 however while ambulance was getting ready to put patient on the stretcher he was obtunded and difficult to arouse with sternal rub. Stroke code was called and admitted to MICU, all work up including CT and MRI are negative. Today pt almost at his baseline and ready to transferred to medical floor.

## 2022-01-30 LAB
ALBUMIN SERPL ELPH-MCNC: 2.7 G/DL — LOW (ref 3.3–5)
ALP SERPL-CCNC: 136 U/L — HIGH (ref 40–120)
ALT FLD-CCNC: 23 U/L — SIGNIFICANT CHANGE UP (ref 10–45)
ANION GAP SERPL CALC-SCNC: 13 MMOL/L — SIGNIFICANT CHANGE UP (ref 5–17)
AST SERPL-CCNC: 30 U/L — SIGNIFICANT CHANGE UP (ref 10–40)
BASOPHILS # BLD AUTO: 0.19 K/UL — SIGNIFICANT CHANGE UP (ref 0–0.2)
BASOPHILS NFR BLD AUTO: 1 % — SIGNIFICANT CHANGE UP (ref 0–2)
BILIRUB SERPL-MCNC: 0.5 MG/DL — SIGNIFICANT CHANGE UP (ref 0.2–1.2)
BUN SERPL-MCNC: 23 MG/DL — SIGNIFICANT CHANGE UP (ref 7–23)
CALCIUM SERPL-MCNC: 8.7 MG/DL — SIGNIFICANT CHANGE UP (ref 8.4–10.5)
CHLORIDE SERPL-SCNC: 106 MMOL/L — SIGNIFICANT CHANGE UP (ref 96–108)
CO2 SERPL-SCNC: 25 MMOL/L — SIGNIFICANT CHANGE UP (ref 22–31)
CREAT SERPL-MCNC: 1.19 MG/DL — SIGNIFICANT CHANGE UP (ref 0.5–1.3)
EOSINOPHIL # BLD AUTO: 0.16 K/UL — SIGNIFICANT CHANGE UP (ref 0–0.5)
EOSINOPHIL NFR BLD AUTO: 0.8 % — SIGNIFICANT CHANGE UP (ref 0–6)
GLUCOSE SERPL-MCNC: 128 MG/DL — HIGH (ref 70–99)
HCT VFR BLD CALC: 39.7 % — SIGNIFICANT CHANGE UP (ref 39–50)
HGB BLD-MCNC: 12.2 G/DL — LOW (ref 13–17)
IMM GRANULOCYTES NFR BLD AUTO: 6.9 % — HIGH (ref 0–1.5)
LYMPHOCYTES # BLD AUTO: 1.55 K/UL — SIGNIFICANT CHANGE UP (ref 1–3.3)
LYMPHOCYTES # BLD AUTO: 8.1 % — LOW (ref 13–44)
MAGNESIUM SERPL-MCNC: 2.4 MG/DL — SIGNIFICANT CHANGE UP (ref 1.6–2.6)
MCHC RBC-ENTMCNC: 30.6 PG — SIGNIFICANT CHANGE UP (ref 27–34)
MCHC RBC-ENTMCNC: 30.7 GM/DL — LOW (ref 32–36)
MCV RBC AUTO: 99.5 FL — SIGNIFICANT CHANGE UP (ref 80–100)
MONOCYTES # BLD AUTO: 0.69 K/UL — SIGNIFICANT CHANGE UP (ref 0–0.9)
MONOCYTES NFR BLD AUTO: 3.6 % — SIGNIFICANT CHANGE UP (ref 2–14)
NEUTROPHILS # BLD AUTO: 15.28 K/UL — HIGH (ref 1.8–7.4)
NEUTROPHILS NFR BLD AUTO: 79.6 % — HIGH (ref 43–77)
NRBC # BLD: 0 /100 WBCS — SIGNIFICANT CHANGE UP (ref 0–0)
PHOSPHATE SERPL-MCNC: 2.8 MG/DL — SIGNIFICANT CHANGE UP (ref 2.5–4.5)
PLATELET # BLD AUTO: 281 K/UL — SIGNIFICANT CHANGE UP (ref 150–400)
POTASSIUM SERPL-MCNC: 3.7 MMOL/L — SIGNIFICANT CHANGE UP (ref 3.5–5.3)
POTASSIUM SERPL-SCNC: 3.7 MMOL/L — SIGNIFICANT CHANGE UP (ref 3.5–5.3)
PROT SERPL-MCNC: 7 G/DL — SIGNIFICANT CHANGE UP (ref 6–8.3)
RBC # BLD: 3.99 M/UL — LOW (ref 4.2–5.8)
RBC # FLD: 14.6 % — HIGH (ref 10.3–14.5)
SODIUM SERPL-SCNC: 144 MMOL/L — SIGNIFICANT CHANGE UP (ref 135–145)
WBC # BLD: 19.19 K/UL — HIGH (ref 3.8–10.5)
WBC # FLD AUTO: 19.19 K/UL — HIGH (ref 3.8–10.5)

## 2022-01-30 PROCEDURE — 99233 SBSQ HOSP IP/OBS HIGH 50: CPT | Mod: GC

## 2022-01-30 PROCEDURE — 95720 EEG PHY/QHP EA INCR W/VEEG: CPT

## 2022-01-30 PROCEDURE — 99232 SBSQ HOSP IP/OBS MODERATE 35: CPT

## 2022-01-30 RX ORDER — VANCOMYCIN HCL 1 G
750 VIAL (EA) INTRAVENOUS EVERY 24 HOURS
Refills: 0 | Status: DISCONTINUED | OUTPATIENT
Start: 2022-01-30 | End: 2022-01-30

## 2022-01-30 RX ORDER — PIPERACILLIN AND TAZOBACTAM 4; .5 G/20ML; G/20ML
4.5 INJECTION, POWDER, LYOPHILIZED, FOR SOLUTION INTRAVENOUS EVERY 8 HOURS
Refills: 0 | Status: DISCONTINUED | OUTPATIENT
Start: 2022-01-30 | End: 2022-02-04

## 2022-01-30 RX ORDER — POTASSIUM CHLORIDE 20 MEQ
40 PACKET (EA) ORAL ONCE
Refills: 0 | Status: COMPLETED | OUTPATIENT
Start: 2022-01-30 | End: 2022-01-30

## 2022-01-30 RX ORDER — VANCOMYCIN HCL 1 G
1000 VIAL (EA) INTRAVENOUS EVERY 24 HOURS
Refills: 0 | Status: COMPLETED | OUTPATIENT
Start: 2022-01-30 | End: 2022-02-01

## 2022-01-30 RX ADMIN — Medication 1 TABLET(S): at 11:08

## 2022-01-30 RX ADMIN — ENOXAPARIN SODIUM 25 MILLIGRAM(S): 100 INJECTION SUBCUTANEOUS at 21:04

## 2022-01-30 RX ADMIN — ATORVASTATIN CALCIUM 40 MILLIGRAM(S): 80 TABLET, FILM COATED ORAL at 21:05

## 2022-01-30 RX ADMIN — TAMSULOSIN HYDROCHLORIDE 0.4 MILLIGRAM(S): 0.4 CAPSULE ORAL at 21:05

## 2022-01-30 RX ADMIN — PIPERACILLIN AND TAZOBACTAM 200 GRAM(S): 4; .5 INJECTION, POWDER, LYOPHILIZED, FOR SOLUTION INTRAVENOUS at 13:07

## 2022-01-30 RX ADMIN — Medication 10 MILLIGRAM(S): at 21:05

## 2022-01-30 RX ADMIN — Medication 250 MILLIGRAM(S): at 14:44

## 2022-01-30 RX ADMIN — SENNA PLUS 2 TABLET(S): 8.6 TABLET ORAL at 21:05

## 2022-01-30 RX ADMIN — ENOXAPARIN SODIUM 25 MILLIGRAM(S): 100 INJECTION SUBCUTANEOUS at 11:08

## 2022-01-30 RX ADMIN — PIPERACILLIN AND TAZOBACTAM 200 GRAM(S): 4; .5 INJECTION, POWDER, LYOPHILIZED, FOR SOLUTION INTRAVENOUS at 21:04

## 2022-01-30 RX ADMIN — FINASTERIDE 5 MILLIGRAM(S): 5 TABLET, FILM COATED ORAL at 11:08

## 2022-01-30 NOTE — PROGRESS NOTE ADULT - SUBJECTIVE AND OBJECTIVE BOX
Neurology Stroke Progress Note    INTERVAL HPI/OVERNIGHT EVENTS:  No acute overnight events. Patient seen and examined this morning. Pt laying in bed, in no acute distress. Denies any pain currently. States he feels "okay"    MEDICATIONS  (STANDING):  atorvastatin 40 milliGRAM(s) Oral at bedtime  enoxaparin Injectable 25 milliGRAM(s) SubCutaneous every 12 hours  finasteride 5 milliGRAM(s) Oral daily  lidocaine   4% Patch 1 Patch Transdermal daily  melatonin 10 milliGRAM(s) Oral at bedtime  multivitamin 1 Tablet(s) Oral daily  polyethylene glycol 3350 17 Gram(s) Oral two times a day  senna 2 Tablet(s) Oral at bedtime  tamsulosin 0.4 milliGRAM(s) Oral at bedtime    MEDICATIONS  (PRN):  acetaminophen     Tablet .. 650 milliGRAM(s) Oral every 6 hours PRN Mild Pain (1 - 3), Moderate Pain (4 - 6)      Allergies    No Known Allergies    Intolerances        Vital Signs Last 24 Hrs  T(C): 37.1 (30 Jan 2022 06:51), Max: 37.1 (29 Jan 2022 19:58)  T(F): 98.7 (30 Jan 2022 06:51), Max: 98.7 (29 Jan 2022 19:58)  HR: 78 (30 Jan 2022 06:51) (71 - 87)  BP: 101/59 (30 Jan 2022 06:51) (95/50 - 108/65)  BP(mean): 74 (29 Jan 2022 18:00) (66 - 76)  RR: 18 (30 Jan 2022 06:51) (18 - 28)  SpO2: 95% (30 Jan 2022 06:51) (92% - 96%)    Physical exam:  General: No acute distress, awake and alert  Eyes: Anicteric sclerae, moist conjunctivae  Extremities: Radial and DP pulses +2, no edema    Neurologic:  -Mental status: Awake, oriented to person and place, not year (2002). Speech is dysarthric,. Intact naming (pen, clock) and repetition.  Follows commands.  Attention/concentration intact. Fund of knowledge appropriate.  -Cranial nerves:   II: Blink to threat intact  III, IV, VI: Extraocular movements are intact without nystagmus. Pupils equally round and reactive to light  V:  Facial sensation V1-V3 equal and intact   VII: Face is symmetric with normal eye closure and smile  VIII: hearing grossly intact  Motor: Normal bulk and tone. No pronator drift. Able to lift all four extremities antigravity, unwilling to participate in strength testing.  Sensation: Intact to light touch bilaterally.   Coordination: No dysmetria on finger-to-nose bilaterally  Reflexes: Downgoing toes bilaterally     LABS:                        11.1   17.69 )-----------( 240      ( 29 Jan 2022 05:46 )             35.4     01-29    142  |  106  |  19  ----------------------------<  105<H>  3.7   |  25  |  1.36<H>    Ca    8.8      29 Jan 2022 05:46  Phos  2.6     01-28  Mg     2.4     01-29    TPro  7.0  /  Alb  2.5<L>  /  TBili  0.6  /  DBili  x   /  AST  45<H>  /  ALT  32  /  AlkPhos  182<H>  01-28    PT/INR - ( 28 Jan 2022 17:49 )   PT: 14.0 sec;   INR: 1.18          PTT - ( 28 Jan 2022 17:49 )  PTT:34.5 sec      RADIOLOGY & ADDITIONAL TESTS:     Neurology Stroke Progress Note    INTERVAL HPI/OVERNIGHT EVENTS:  No acute overnight events. Patient seen and examined this morning. Pt laying in bed, in no acute distress. Denies any pain currently. States he feels "okay"    MEDICATIONS  (STANDING):  atorvastatin 40 milliGRAM(s) Oral at bedtime  enoxaparin Injectable 25 milliGRAM(s) SubCutaneous every 12 hours  finasteride 5 milliGRAM(s) Oral daily  lidocaine   4% Patch 1 Patch Transdermal daily  melatonin 10 milliGRAM(s) Oral at bedtime  multivitamin 1 Tablet(s) Oral daily  polyethylene glycol 3350 17 Gram(s) Oral two times a day  senna 2 Tablet(s) Oral at bedtime  tamsulosin 0.4 milliGRAM(s) Oral at bedtime    MEDICATIONS  (PRN):  acetaminophen     Tablet .. 650 milliGRAM(s) Oral every 6 hours PRN Mild Pain (1 - 3), Moderate Pain (4 - 6)      Allergies    No Known Allergies    Intolerances        Vital Signs Last 24 Hrs  T(C): 37.1 (30 Jan 2022 06:51), Max: 37.1 (29 Jan 2022 19:58)  T(F): 98.7 (30 Jan 2022 06:51), Max: 98.7 (29 Jan 2022 19:58)  HR: 78 (30 Jan 2022 06:51) (71 - 87)  BP: 101/59 (30 Jan 2022 06:51) (95/50 - 108/65)  BP(mean): 74 (29 Jan 2022 18:00) (66 - 76)  RR: 18 (30 Jan 2022 06:51) (18 - 28)  SpO2: 95% (30 Jan 2022 06:51) (92% - 96%)    Physical exam:  General: No acute distress, awake and alert  Eyes: Anicteric sclerae, moist conjunctivae  Extremities: Radial and DP pulses +2, no edema    Neurologic:  -Mental status: Awake, oriented to person and place, not year (2002). Speech is dysarthric,. Intact naming (pen, clock) and repetition.  Follows commands.  Attention/concentration intact. Fund of knowledge appropriate.  -Cranial nerves:   II: Blink to threat intact  III, IV, VI: Extraocular movements are intact without nystagmus. Pupils equally round and reactive to light  V:  Facial sensation V1-V3 equal and intact   VII: Face is symmetric with normal eye closure and smile  VIII: hearing grossly intact  Motor: Normal bulk and tone. No pronator drift. Able to lift all four extremities antigravity, unwilling to participate in fruther strength testing.  Sensation: Intact to light touch bilaterally.   Coordination: No dysmetria on finger-to-nose bilaterally    LABS:                        11.1   17.69 )-----------( 240      ( 29 Jan 2022 05:46 )             35.4     01-29    142  |  106  |  19  ----------------------------<  105<H>  3.7   |  25  |  1.36<H>    Ca    8.8      29 Jan 2022 05:46  Phos  2.6     01-28  Mg     2.4     01-29    TPro  7.0  /  Alb  2.5<L>  /  TBili  0.6  /  DBili  x   /  AST  45<H>  /  ALT  32  /  AlkPhos  182<H>  01-28    PT/INR - ( 28 Jan 2022 17:49 )   PT: 14.0 sec;   INR: 1.18          PTT - ( 28 Jan 2022 17:49 )  PTT:34.5 sec      RADIOLOGY & ADDITIONAL TESTS:    Video EEG 1/29-1/30  Daily Summary:      slowing suggestive of   dysfunction.  No epileptiform activity and no significant clinical events occurred.

## 2022-01-30 NOTE — EEG REPORT - NS EEG TEXT BOX
Day 2: 1/29/2022 @ 07:00 AM to 19:03  Background:  , with predominantly alpha/theta/delta frequencies.  Intermittent generalized delta activity noted.  Symmetry:    Posterior Dominant Rhythm:  8-9hz .  Organization: .  Voltage:    Variability: . 		Reactivity: .  N2 sleep: .  Spontaneous Activity:    Periodic/rhythmic activity:  .  Events:  No electrographic seizures or significant clinical events.  Provocations:  Hyperventilation and Photic stimulation: .    Daily Summary:      slowing suggestive of   dysfunction.  No epileptiform activity and no significant clinical events occurred.    Alin Mcdaniel MD  Attending Neurologist, [facility]

## 2022-01-30 NOTE — PROGRESS NOTE ADULT - SUBJECTIVE AND OBJECTIVE BOX
INTERVAL HPI/OVERNIGHT EVENTS: No acute events overnight    SUBJECTIVE: Patient seen and examined at bedside. Answering questions, denies acute complaints. Denies fever, headache, nausea, vomiting, chest pain, shortness of breath, abdominal pain, changes in bowel movements or urination.     OBJECTIVE:    VITAL SIGNS:  ICU Vital Signs Last 24 Hrs  T(C): 37.1 (30 Jan 2022 06:51), Max: 37.1 (29 Jan 2022 19:58)  T(F): 98.7 (30 Jan 2022 06:51), Max: 98.7 (29 Jan 2022 19:58)  HR: 78 (30 Jan 2022 06:51) (76 - 87)  BP: 101/59 (30 Jan 2022 06:51) (95/50 - 108/65)  BP(mean): 74 (29 Jan 2022 18:00) (66 - 76)  ABP: --  ABP(mean): --  RR: 18 (30 Jan 2022 06:51) (18 - 28)  SpO2: 95% (30 Jan 2022 06:51) (93% - 96%)        01-29 @ 07:01  -  01-30 @ 07:00  --------------------------------------------------------  IN: 340 mL / OUT: 1280 mL / NET: -940 mL      CAPILLARY BLOOD GLUCOSE      POCT Blood Glucose.: 93 mg/dL (28 Jan 2022 17:15)      PHYSICAL EXAM:  General: NAD, answering questions, non toxic  HEENT: NC/AT; PERRL, clear conjunctiva  Neck: supple  Respiratory: CTA b/l  Cardiovascular: +S1/S2; RRR  Abdomen: soft, NT/ND; +BS x4  : waite catheter in place, clear yellow urine  Extremities: WWP, 2+ peripheral pulses b/l; no LE edema  Skin: normal color and turgor; no rash, dry skin all over  Neurological: AAOx2-3    MEDICATIONS:  MEDICATIONS  (STANDING):  atorvastatin 40 milliGRAM(s) Oral at bedtime  enoxaparin Injectable 25 milliGRAM(s) SubCutaneous every 12 hours  finasteride 5 milliGRAM(s) Oral daily  lidocaine   4% Patch 1 Patch Transdermal daily  melatonin 10 milliGRAM(s) Oral at bedtime  multivitamin 1 Tablet(s) Oral daily  polyethylene glycol 3350 17 Gram(s) Oral two times a day  senna 2 Tablet(s) Oral at bedtime  tamsulosin 0.4 milliGRAM(s) Oral at bedtime    MEDICATIONS  (PRN):  acetaminophen     Tablet .. 650 milliGRAM(s) Oral every 6 hours PRN Mild Pain (1 - 3), Moderate Pain (4 - 6)      ALLERGIES:  Allergies    No Known Allergies    Intolerances        LABS:                        11.1   17.69 )-----------( 240      ( 29 Jan 2022 05:46 )             35.4     01-29    142  |  106  |  19  ----------------------------<  105<H>  3.7   |  25  |  1.36<H>    Ca    8.8      29 Jan 2022 05:46  Mg     2.4     01-29    TPro  7.0  /  Alb  2.5<L>  /  TBili  0.6  /  DBili  x   /  AST  45<H>  /  ALT  32  /  AlkPhos  182<H>  01-28    PT/INR - ( 28 Jan 2022 17:49 )   PT: 14.0 sec;   INR: 1.18          PTT - ( 28 Jan 2022 17:49 )  PTT:34.5 sec                RADIOLOGY & ADDITIONAL TESTS: Reviewed.

## 2022-01-30 NOTE — PROGRESS NOTE ADULT - PROBLEM SELECTOR PLAN 6
reported charted history of CHF, EF unknown, pt denies h/o CHF and reports normal ECHO. BNP 2006. No JVD, crackles, or LE edema on exam. Cardiac enzymes negative. EKG with no acute ischemic changes. CXR with RLL infiltrate and does not appear to be overload in nature  - does not appear to be in exacerbation at this time > euvolemic  - f/u TTE

## 2022-01-30 NOTE — PROGRESS NOTE ADULT - ASSESSMENT
85 yo M w/ PMH CHF (Unknown EF), HTN, CKD (baseline Cr 1.3), and BPH who presented from [facility] w/ right-sided chest and back pain along with generalized weakness/poor PO intake, found to have sepsis secondary to COVID and MRSA PNA - downtrending WBC and inflammatory markers, as well as mixed MURRAY, course c/b urinary retention and delirium. Patient pending discharge but then became unresponsive; stroke code activated, NIHSS 24. CT imaging negative for acute pathology. CT C/A/P showed large right lower lobe consolidation consistent with lobar pneumonia, small to moderate-sized partially loculated right pleural effusion with pleural thickening suspicious for parapneumonic effusion/empyema. Patient upgraded from RMF to MICU. Patient now back to baseline, with neurologic examination today non-focal. Video EEG with no epileptiform activity. Clinical picture not consistent with stroke etiology, possibly due to focal seizure in setting of infection versus transient toxic metabolic encephalopathy. Patient stable for step down to regional floor from neurologic perspective.   incomplete    - Continue Q1H neuro checks while in ICU, can do Q8H general neuro checks once stepped down  - Continue video EEG for now  - DVT ppx with Lovenox and SCDs     87 yo M w/ PMH CHF (Unknown EF), HTN, CKD (baseline Cr 1.3), and BPH who presented from [facility] w/ right-sided chest and back pain along with generalized weakness/poor PO intake, found to have sepsis secondary to COVID and MRSA PNA - downtrending WBC and inflammatory markers, as well as mixed MURRAY, course c/b urinary retention and delirium. Patient pending discharge but then became unresponsive; stroke code activated, NIHSS 24. CT imaging negative for acute pathology. CT C/A/P showed large right lower lobe consolidation consistent with lobar pneumonia, small to moderate-sized partially loculated right pleural effusion with pleural thickening suspicious for parapneumonic effusion/empyema. Patient upgraded from RMF to MICU. Patient now back to baseline, with neurologic examination today non-focal. Video EEG with no epileptiform activity. Clinical picture not consistent with stroke etiology, possibly due to focal seizure in setting of infection versus transient toxic metabolic encephalopathy. Patient stable for step down to regional floor from neurologic perspective.    - EEG read thus far negative for seizures, can dc EEG  - DVT ppx with Lovenox and SCDs    No further stroke workup at this time, we will sign off. Please call us for any questions    Discussed case with Neurology Attending Dr. Smith

## 2022-01-30 NOTE — PROGRESS NOTE ADULT - PROBLEM SELECTOR PLAN 4
on admission with BP 87/57, WBC 24.53 with neutrophilic predom 87.6%. Lactate 1.0. CXR RLL infiltrate. UA unremarkable. 94% on room air->97% on 2LNC. Right basilar crackles on exam. COVID +, Procal 0.46  MRSA positive, received 7 day course of zosyn and 4 days of vancomycin (d/c 1/26 given clinical improvement and i/s/o worsening renal function)  downtrending WBC but now plateau at 17  SpO2 95% on RA on 1/26/22  CRP downtrending but Ferritin and D dimer increasing  - will pursue 14 day course of antibiotics  - c/w vancomycin and zosyn  - deescalate to oral regimen at discharge on admission with BP 87/57, WBC 24.53 with neutrophilic predom 87.6%. Lactate 1.0. CXR RLL infiltrate. UA unremarkable. 94% on room air->97% on 2LNC. Right basilar crackles on exam. COVID +, Procal 0.46  MRSA positive, received 7 day course of zosyn and 4 days of vancomycin (d/c 1/26 given clinical improvement and i/s/o worsening renal function)  downtrending WBC but now plateau at 17  SpO2 95% on RA on 1/26/22  CRP downtrending but Ferritin and D dimer increasing  CT Chest: Large right lower lobe consolidation consistent with lobar pneumonia. Small to moderate-sized partially loculated right pleural effusion with pleural thickening suspicious for parapneumonic effusion/empyema.  - will pursue 14 day course of antibiotics  - c/w vancomycin and zosyn  - deescalate to oral regimen at discharge

## 2022-01-30 NOTE — PROGRESS NOTE ADULT - PROBLEM SELECTOR PLAN 7
history of HLD  - continue home Rosuvastatin 10mg daily (atorvastatin 40mg daily)      #Delirium  Multifactorial i/s/o sepsis, urinary retention and pain  Pt on restraints starting 1/23 which was switched to mittens 1/24 AM and mittens removed 1/25 AM  - c/w melatonin 10 mg QHS  - can use zyprexa 5 mg IM if severe agitation or pt can harm self or others.    #Right upper extremity infiltration  2/2 to peripheral IV line, switched to left side on 1/25

## 2022-01-30 NOTE — PROGRESS NOTE ADULT - ATTENDING COMMENTS
Patient discussed with resident team and plan of care reviewed. I have personally reviewed all pertinent labs and imaging and performed an independent history and physical. Resident note personally reviewed, and I agree with above resident note with the following additions:    Agree with above. D/C EEG today as negative. Mental status appears back to baseline. Unclear cause of episode of encephalopathy - workup has thus far been negative. Notably CT scan did show complex effusion and will continue abx for now and give extended course. If still does well tomorrow can probably d/c to [facility] with doxy/levaquin

## 2022-01-30 NOTE — PROGRESS NOTE ADULT - PROBLEM SELECTOR PLAN 1
Pt found to be unresponsive on 1/28 just prior to planned DC, gag reflex absent. Stroke code called, stepped up to ICU. CTH negative for stroke. DDX includes seizure. Patient now back to baseline A&Ox2-3  -vEEG: mild generalized slowing without epileptiform activity  -Episode appears transient and patient without new metabolic abnormalities

## 2022-01-31 LAB
ALBUMIN SERPL ELPH-MCNC: 2.6 G/DL — LOW (ref 3.3–5)
ALP SERPL-CCNC: 121 U/L — HIGH (ref 40–120)
ALT FLD-CCNC: 22 U/L — SIGNIFICANT CHANGE UP (ref 10–45)
ANION GAP SERPL CALC-SCNC: 12 MMOL/L — SIGNIFICANT CHANGE UP (ref 5–17)
AST SERPL-CCNC: 31 U/L — SIGNIFICANT CHANGE UP (ref 10–40)
BASOPHILS # BLD AUTO: 0.19 K/UL — SIGNIFICANT CHANGE UP (ref 0–0.2)
BASOPHILS NFR BLD AUTO: 1 % — SIGNIFICANT CHANGE UP (ref 0–2)
BILIRUB SERPL-MCNC: 0.6 MG/DL — SIGNIFICANT CHANGE UP (ref 0.2–1.2)
BUN SERPL-MCNC: 19 MG/DL — SIGNIFICANT CHANGE UP (ref 7–23)
CALCIUM SERPL-MCNC: 8.8 MG/DL — SIGNIFICANT CHANGE UP (ref 8.4–10.5)
CHLORIDE SERPL-SCNC: 102 MMOL/L — SIGNIFICANT CHANGE UP (ref 96–108)
CO2 SERPL-SCNC: 26 MMOL/L — SIGNIFICANT CHANGE UP (ref 22–31)
CREAT SERPL-MCNC: 1.22 MG/DL — SIGNIFICANT CHANGE UP (ref 0.5–1.3)
EOSINOPHIL # BLD AUTO: 0.27 K/UL — SIGNIFICANT CHANGE UP (ref 0–0.5)
EOSINOPHIL NFR BLD AUTO: 1.5 % — SIGNIFICANT CHANGE UP (ref 0–6)
GLUCOSE SERPL-MCNC: 102 MG/DL — HIGH (ref 70–99)
HCT VFR BLD CALC: 37.8 % — LOW (ref 39–50)
HGB BLD-MCNC: 12 G/DL — LOW (ref 13–17)
IMM GRANULOCYTES NFR BLD AUTO: 6.7 % — HIGH (ref 0–1.5)
LYMPHOCYTES # BLD AUTO: 1.65 K/UL — SIGNIFICANT CHANGE UP (ref 1–3.3)
LYMPHOCYTES # BLD AUTO: 8.9 % — LOW (ref 13–44)
MAGNESIUM SERPL-MCNC: 2.2 MG/DL — SIGNIFICANT CHANGE UP (ref 1.6–2.6)
MCHC RBC-ENTMCNC: 31.7 GM/DL — LOW (ref 32–36)
MCHC RBC-ENTMCNC: 31.7 PG — SIGNIFICANT CHANGE UP (ref 27–34)
MCV RBC AUTO: 100 FL — SIGNIFICANT CHANGE UP (ref 80–100)
MONOCYTES # BLD AUTO: 0.88 K/UL — SIGNIFICANT CHANGE UP (ref 0–0.9)
MONOCYTES NFR BLD AUTO: 4.8 % — SIGNIFICANT CHANGE UP (ref 2–14)
NEUTROPHILS # BLD AUTO: 14.21 K/UL — HIGH (ref 1.8–7.4)
NEUTROPHILS NFR BLD AUTO: 77.1 % — HIGH (ref 43–77)
NRBC # BLD: 0 /100 WBCS — SIGNIFICANT CHANGE UP (ref 0–0)
PHOSPHATE SERPL-MCNC: 2.6 MG/DL — SIGNIFICANT CHANGE UP (ref 2.5–4.5)
PLATELET # BLD AUTO: 272 K/UL — SIGNIFICANT CHANGE UP (ref 150–400)
POTASSIUM SERPL-MCNC: 3.8 MMOL/L — SIGNIFICANT CHANGE UP (ref 3.5–5.3)
POTASSIUM SERPL-SCNC: 3.8 MMOL/L — SIGNIFICANT CHANGE UP (ref 3.5–5.3)
PROT SERPL-MCNC: 6.8 G/DL — SIGNIFICANT CHANGE UP (ref 6–8.3)
RBC # BLD: 3.78 M/UL — LOW (ref 4.2–5.8)
RBC # FLD: 14.6 % — HIGH (ref 10.3–14.5)
SARS-COV-2 RNA SPEC QL NAA+PROBE: NEGATIVE — SIGNIFICANT CHANGE UP
SODIUM SERPL-SCNC: 140 MMOL/L — SIGNIFICANT CHANGE UP (ref 135–145)
WBC # BLD: 18.44 K/UL — HIGH (ref 3.8–10.5)
WBC # FLD AUTO: 18.44 K/UL — HIGH (ref 3.8–10.5)

## 2022-01-31 PROCEDURE — 99233 SBSQ HOSP IP/OBS HIGH 50: CPT | Mod: GC

## 2022-01-31 PROCEDURE — 99222 1ST HOSP IP/OBS MODERATE 55: CPT

## 2022-01-31 PROCEDURE — 95720 EEG PHY/QHP EA INCR W/VEEG: CPT

## 2022-01-31 RX ADMIN — PIPERACILLIN AND TAZOBACTAM 200 GRAM(S): 4; .5 INJECTION, POWDER, LYOPHILIZED, FOR SOLUTION INTRAVENOUS at 15:06

## 2022-01-31 RX ADMIN — PIPERACILLIN AND TAZOBACTAM 200 GRAM(S): 4; .5 INJECTION, POWDER, LYOPHILIZED, FOR SOLUTION INTRAVENOUS at 06:26

## 2022-01-31 RX ADMIN — ENOXAPARIN SODIUM 25 MILLIGRAM(S): 100 INJECTION SUBCUTANEOUS at 21:46

## 2022-01-31 RX ADMIN — LIDOCAINE 1 PATCH: 4 CREAM TOPICAL at 11:33

## 2022-01-31 RX ADMIN — ATORVASTATIN CALCIUM 40 MILLIGRAM(S): 80 TABLET, FILM COATED ORAL at 21:47

## 2022-01-31 RX ADMIN — TAMSULOSIN HYDROCHLORIDE 0.4 MILLIGRAM(S): 0.4 CAPSULE ORAL at 21:47

## 2022-01-31 RX ADMIN — Medication 10 MILLIGRAM(S): at 21:47

## 2022-01-31 RX ADMIN — PIPERACILLIN AND TAZOBACTAM 200 GRAM(S): 4; .5 INJECTION, POWDER, LYOPHILIZED, FOR SOLUTION INTRAVENOUS at 21:47

## 2022-01-31 RX ADMIN — FINASTERIDE 5 MILLIGRAM(S): 5 TABLET, FILM COATED ORAL at 11:33

## 2022-01-31 RX ADMIN — POLYETHYLENE GLYCOL 3350 17 GRAM(S): 17 POWDER, FOR SOLUTION ORAL at 17:30

## 2022-01-31 RX ADMIN — LIDOCAINE 1 PATCH: 4 CREAM TOPICAL at 23:33

## 2022-01-31 RX ADMIN — SENNA PLUS 2 TABLET(S): 8.6 TABLET ORAL at 21:47

## 2022-01-31 RX ADMIN — ENOXAPARIN SODIUM 25 MILLIGRAM(S): 100 INJECTION SUBCUTANEOUS at 09:14

## 2022-01-31 RX ADMIN — Medication 250 MILLIGRAM(S): at 12:33

## 2022-01-31 RX ADMIN — LIDOCAINE 1 PATCH: 4 CREAM TOPICAL at 18:21

## 2022-01-31 RX ADMIN — Medication 1 TABLET(S): at 11:33

## 2022-01-31 NOTE — PROGRESS NOTE ADULT - PROBLEM SELECTOR PLAN 4
on admission with BP 87/57, WBC 24.53 with neutrophilic predom 87.6%. Lactate 1.0. CXR RLL infiltrate. UA unremarkable. 94% on room air->97% on 2LNC. Right basilar crackles on exam. COVID +, Procal 0.46  MRSA positive, received 7 day course of zosyn and 4 days of vancomycin (d/c 1/26 given clinical improvement and i/s/o worsening renal function)  downtrending WBC but now plateau at 17  SpO2 95% on RA on 1/26/22  CRP downtrending but Ferritin and D dimer increasing  CT Chest: Large right lower lobe consolidation consistent with lobar pneumonia. Small to moderate-sized partially loculated right pleural effusion with pleural thickening suspicious for parapneumonic effusion/empyema.  - will pursue 14 day course of antibiotics  - c/w vancomycin and zosyn  - deescalate to oral regimen at discharge (doxy, levaquin)

## 2022-01-31 NOTE — CONSULT NOTE ADULT - ATTENDING COMMENTS
This elderly nursing home resident with Covid has an apparent parapneumonic effusion that has worsened to some extent on serial imaging. WBC has plateaued around 18. This space should be drained but we cannot see a window by ultrasound this evening to do this. Please repeat CT and we will reassess. Cont broad spectrum Abx. Dr Ponce assumes care tomorrow.
Transfer to MICU for monitoring after episode of unresponsiveness. Was pending discharge after being treated for MRSA pneumonia; COVID + incidentally but not an active issue. Not stroke as no lateralizing signs. Doubt seizure but will do EEG. Not hypoxemic and not hypotensive. Has a complex right sided effusion that is chronic and most likely secondary to recurrent aspiration; clinically not c/w empyema; serial daily lung POCUS.

## 2022-01-31 NOTE — CONSULT NOTE ADULT - ASSESSMENT
85 yo M w/ PMH CHF (Unknown EF), HTN, CKD (baseline Cr 1.3), BPH presented from [facility] w/ right-sided CP and back pain along with generalized weakness/poor PO intake, found with sepsis i/s/o COVID PNA along with RLL CAP - downtrending WBC and inflammatory markers, as well as mixed MURRAY, course c/b urinary retention, s/p waite placement, course c/b delirium. 1/28 patient pending discharge but then became unresponsive stroke code called, ICU consult then consulted for concern of inability to protect airway and stroke code called, however patient able to protect airway and CTA head and neck negative. Patient stable for stepdown to RMF and now with concern of CT chest finding showing small to moderate sized partially loculated right pleural effusion with pleural thickening suspicious for parapneumonic effusion/empyema and pulmonary consulted for management recommendation    #Parapneumonic effusion/empyema  #RLL PNA 2/2 HAP/COVID      Recommendations:   87 yo M w/ PMH CHF (Unknown EF), HTN, CKD (baseline Cr 1.3), BPH presented from [facility] w/ right-sided CP and back pain along with generalized weakness/poor PO intake, found with sepsis i/s/o COVID PNA along with RLL CAP - downtrending WBC and inflammatory markers, as well as mixed MURRAY, course c/b urinary retention, s/p waite placement, course c/b delirium. 1/28 patient pending discharge but then became unresponsive stroke code called, ICU consult then consulted for concern of inability to protect airway and stroke code called, however patient able to protect airway and CTA head and neck negative. Patient stable for stepdown to RMF and now with concern of CT chest finding showing small to moderate sized partially loculated right pleural effusion with pleural thickening suspicious for parapneumonic effusion/empyema and pulmonary consulted for management recommendation    #Parapneumonic effusion/empyema  #RLL PNA 2/2 HAP/COVID      Recommendations:  -Repeat CT chest without contrast to evaluate evolution of effusion  -c/w course of antibiotic (vancomycin and zosyn)

## 2022-01-31 NOTE — EEG REPORT - NS EEG TEXT BOX
[facility] Department of Neurology  Inpatient Continuous video-Electroencephalography Report    Acquisition Details:  Electroencephalography was acquired using a minimum of 21 channels on an [employer] Neurology system v 8.5.1 with electrode placement according to the standard International 10-20 system following ACNS (American Clinical Neurophysiology Society) guidelines for Long-Term Video EEG monitoring.  Anterior temporal T1 and T2 electrodes were utilized whenever possible.   The [employer] automated spike & seizure detections were all reviewed in detail, in addition to extensive portions of raw EEG.    Daily Updates (from 07:00 am until 07:00 am):  Day 4: 1/30/2022 – 1/31/2022:   Pertinent medications: n/a  Awake Background:  continuous, with predominantly alpha and beta  frequencies.  Symmetry:  No persistent asymmetries of voltage or frequency.  Organization: Appropriate anterior-posterior gradient.  Posterior Dominant Rhythm: 9 Hz symmetric, well-organized, and poorly-modulated.  Voltage:  Normal (20+ uV)  Variability: Yes. 		Reactivity: Yes.  N2 sleep: symmetric, synchronous sleep spindles/K-complexes.  Spontaneous Activity:  No epileptiform discharges.  Periodic/rhythmic activity:  Events:  No electrographic seizures or significant clinical events.  Provocations:  Hyperventilation and Photic stimulation: not performed.    Daily Summary:    Unremarkable awake and sleep recording.     Richard Dalal DO  Clinical Neurophysiology Fellow    Holly Carter MD  Attending Neurologist, [facility] Epilepsy Program   [facility] Department of Neurology  Inpatient Continuous video-Electroencephalography Report    Acquisition Details:  Electroencephalography was acquired using a minimum of 21 channels on an [employer] Neurology system v 8.5.1 with electrode placement according to the standard International 10-20 system following ACNS (American Clinical Neurophysiology Society) guidelines for Long-Term Video EEG monitoring.  Anterior temporal T1 and T2 electrodes were utilized whenever possible.   The XLTEK automated spike & seizure detections were all reviewed in detail, in addition to extensive portions of raw EEG.    Daily Updates (from 07:00 am until 07:00 am):  Day 4: 1/30/2022 – 1/31/2022:   Pertinent medications: n/a  Awake Background:  continuous, with predominantly alpha and beta  frequencies.  Symmetry:  No persistent asymmetries of voltage or frequency.  Organization: Appropriate anterior-posterior gradient.  Posterior Dominant Rhythm: 9 Hz symmetric, well-organized, and poorly-modulated.  Voltage:  Normal (20+ uV)  Variability: Yes. 		Reactivity: Yes.  N2 sleep: symmetric, synchronous sleep spindles/K-complexes.  Spontaneous Activity:  No epileptiform discharges.  Periodic/rhythmic activity:  Events:  No electrographic seizures or significant clinical events.  Provocations:  Hyperventilation and Photic stimulation: not performed.    Daily Summary:    Normal awake and sleep recording.     Richard Dalal DO  Clinical Neurophysiology Fellow    Holly Carter MD  Attending Neurologist, [facility] Epilepsy Program   [facility] Department of Neurology  Inpatient Continuous video-Electroencephalography Report    Acquisition Details:  Electroencephalography was acquired using a minimum of 21 channels on an [employer] Neurology system v 8.5.1 with electrode placement according to the standard International 10-20 system following ACNS (American Clinical Neurophysiology Society) guidelines for Long-Term Video EEG monitoring.  Anterior temporal T1 and T2 electrodes were utilized whenever possible.   The XLTEK automated spike & seizure detections were all reviewed in detail, in addition to extensive portions of raw EEG.    Daily Updates (from 07:00 am until 07:00 am):  Day 4: 1/30/2022 – 1/31/2022:   Pertinent medications: n/a  Awake Background:  continuous, with predominantly alpha and beta  frequencies.  Symmetry:  No persistent asymmetries of voltage or frequency.  Organization: Appropriate anterior-posterior gradient.  Posterior Dominant Rhythm: 9 Hz symmetric, well-organized, and poorly-modulated.  Voltage:  Normal (20+ uV)  Variability: Yes. 		Reactivity: Yes.  N2 sleep: symmetric, synchronous sleep spindles/K-complexes.  Spontaneous Activity:  No epileptiform discharges.  Periodic/rhythmic activity:  Events:  No electrographic seizures or significant clinical events.  Provocations:  Hyperventilation and Photic stimulation: not performed.    Daily Summary:    Normal awake and sleep recording.     Richard Dalal DO  Clinical Neurophysiology Fellow    Holly Carter MD  Attending Neurologist, [facility] Epilepsy Program

## 2022-01-31 NOTE — EEG REPORT - NS EEG TEXT BOX
[facility] Department of Neurology  Inpatient Continuous video-Electroencephalogram    Patient Name:	George Batres    :	1935  MRN:	[id number]    Study Start Date/Time:  2022, 20:06:03  Break: 2022, 19:03 to 2022, 07:00  Study End Date/Time:	2022, 11:24:30    Referred by: stroke service    Brief Clinical History:  George Batres is an 86 year old Male with history of HTN, CKD, CHF, BPH who was admitted on 2022 for dyspnea and generalized weakness, found COVID positive with sepsis and RLL CAP. Hospital course remarkable for hypotension, decreased urine output, and delirium with agitation, with Stroke Code called on  for acute unresponsiveness just as patient was about to be discharged to SNF; imaging unremarkable and neuro exam improved. Study performed to investigate for seizures or markers of epilepsy.    Diagnosis Code:   R56.9 convulsions/seizure  CPT: 05647 EEG with video 12-26h  Technical CPT: 92700 set-up +  69325 vEEG unmonitored 12-h    Pertinent Medications:  n/a    Acquisition Details:  Electroencephalography was acquired using a minimum of 21 channels on an [employer] Neurology system v 8.5.1 with electrode placement according to the standard International 10-20 system following ACNS (American Clinical Neurophysiology Society) guidelines for Long-Term Video EEG monitoring.  Anterior temporal T1 and T2 electrodes were utilized whenever possible.   The XL[employer]K automated spike & seizure detections were all reviewed in detail, in addition to extensive portions of raw EEG.    Day 1: 2022 @ 22:06 to next morning @ 07:00   Background:  continuous, with predominantly alpha/theta/delta frequencies.  Intermittent generalized delta activity noted.  Symmetry:  No persistent asymmetries of voltage or frequency.  Posterior Dominant Rhythm:  8-9hz symmetric, well-organized, and well-modulated.  Organization: Appropriate anterior-posterior gradient.  Voltage:  Normal (20+ uV)  Variability: Yes. 		Reactivity: Yes.  N2 sleep: Symmetric, synchronous spindles and K complexes.  Spontaneous Activity:  No epileptiform discharges.  Periodic/rhythmic activity:  None.  Events:  No electrographic seizures or significant clinical events.  Provocations:  Hyperventilation and Photic stimulation: was not performed.    Daily Summary:    Mild generalized slowing suggestive of diffuse or multifocal  dysfunction.  No epileptiform activity and no significant clinical events occurred.    Alin Mcdaniel MD  Attending Neurologist, [facility] Epilepsy Program          Daily Updates:  Day 2: 2022 @ 07:00 to 19:03   Background:  continuous, with predominantly alpha/theta/delta frequencies.  Intermittent generalized delta activity noted.  Symmetry:  No persistent asymmetries of voltage or frequency.  Posterior Dominant Rhythm:  8-9hz symmetric, well-organized, and well-modulated.  Organization: Appropriate anterior-posterior gradient.  Voltage:  Normal (20+ uV)  Variability: Yes. 		Reactivity: Yes.  N2 sleep: Symmetric, synchronous spindles and K complexes.  Spontaneous Activity:  No epileptiform discharges.  Periodic/rhythmic activity:  None.  Events:  No electrographic seizures or significant clinical events.  Provocations:  Hyperventilation and Photic stimulation: was not performed.    Daily Summary:    Mild generalized slowing suggestive of diffuse or multifocal  dysfunction.  No epileptiform activity and no significant clinical events occurred.    Alin Mcdaniel MD  Attending Neurologist, [facility] Epilepsy Program      Daily Updates:  Day 3: 2022 @ 07:00 to 2022 @ 11:24:   Pertinent medications: n/a  Awake Background:  continuous, with predominantly alpha and beta  frequencies.  Symmetry:  No persistent asymmetries of voltage or frequency.  Organization: Appropriate anterior-posterior gradient.  Posterior Dominant Rhythm: 9 Hz symmetric, well-organized, and poorly-modulated.  Voltage:  Normal (20+ uV)  Variability: Yes. 		Reactivity: Yes.  N2 sleep: symmetric, synchronous sleep spindles/K-complexes.  Spontaneous Activity:  No epileptiform discharges.  Periodic/rhythmic activity:  Events:  No electrographic seizures or significant clinical events.  Provocations:  Hyperventilation and Photic stimulation: not performed.    Daily Summary:    Normal awake and sleep recording.   No epileptiform activity and no significant clinical events occurred.    Richard Dalal DO  Clinical Neurophysiology Fellow    Holly Carter MD  Attending Neurologist, [facility] Epilepsy Program            FINAL Summary:  Normal continuous av-EEG study.  1)	Borderline generalized background slowing in initial half of recording, resolved over last 24+ hours.   2)	No epileptiform activity and no significant clinical events occurred.      Final Clinical Correlation:  Initial mild diffuse or multifocal cerebral dysfunction, later resolved.  There were no findings of active epilepsy, however this alone does not rule out the diagnosis.       Richard Dalal DO  Clinical Neurophysiology Fellow    Holly Carter MD  Attending Neurologist, [facility] Epilepsy Program

## 2022-01-31 NOTE — PROGRESS NOTE ADULT - SUBJECTIVE AND OBJECTIVE BOX
Physical Medicine and Rehabilitation Progress Note:    Patient is a 86y old  Male who presents with a chief complaint of sepsis due to pneumonia (31 Jan 2022 13:00)      HPI:  86M PMH CHF, HTN, CKD (baseline Cr 1.3), BPH  presents to the ED from [facility] with a complaint of pain when breathing and generalized weakness for the past 2 days. He states that when he takes a deep breath he feel pain on the right side of his chest but otherwise denies nay shortness of breath or pain at rest. He says he has been eating less the past few days and feeling more weak. Patient has been a resident of [facility] for years and has no fevers, rhinorrhea, cough, abdominal pain, n/v/d/c, edema. Patient has been vaccinated x2 for COVID. He is full code.    ED Course:   Vitals: T 98.6F (rectal), HR 86, BP 98/54, RR 18, O2 94% on RA  Labs: WBC 24.53, Neutrophils 87.6%, hb 11.7, plts 166, Lactate 1.0, Na 136, K 4.0, Cl 103, CO2 22, AG 13, BUN/Cr 26/1.59 (cr 1.3 baseline), glucose 122, trops 0.01, BNP 2006, procal 0.46, UA negative, COVID+  EKG: NSR HR 73, no acute ischemic changes, qtc 416  Imaging: CXR RLL infiltrate  Interventions: Vancomycin 1g IV and Zosyn 3.375g IV, (reported 500cc bolus before arrival)   (22 Jan 2022 22:27)                            12.0   18.44 )-----------( 272      ( 31 Jan 2022 08:19 )             37.8       01-31    140  |  102  |  19  ----------------------------<  102<H>  3.8   |  26  |  1.22    Ca    8.8      31 Jan 2022 08:19  Phos  2.6     01-31  Mg     2.2     01-31    TPro  6.8  /  Alb  2.6<L>  /  TBili  0.6  /  DBili  x   /  AST  31  /  ALT  22  /  AlkPhos  121<H>  01-31    Vital Signs Last 24 Hrs  T(C): 37.1 (31 Jan 2022 06:18), Max: 37.1 (31 Jan 2022 06:18)  T(F): 98.7 (31 Jan 2022 06:18), Max: 98.7 (31 Jan 2022 06:18)  HR: 68 (31 Jan 2022 10:30) (68 - 86)  BP: 101/68 (31 Jan 2022 10:30) (93/57 - 116/89)  BP(mean): --  RR: 18 (31 Jan 2022 06:18) (18 - 18)  SpO2: 95% (31 Jan 2022 10:30) (94% - 96%)    MEDICATIONS  (STANDING):  atorvastatin 40 milliGRAM(s) Oral at bedtime  enoxaparin Injectable 25 milliGRAM(s) SubCutaneous every 12 hours  finasteride 5 milliGRAM(s) Oral daily  lidocaine   4% Patch 1 Patch Transdermal daily  melatonin 10 milliGRAM(s) Oral at bedtime  multivitamin 1 Tablet(s) Oral daily  piperacillin/tazobactam IVPB.. 4.5 Gram(s) IV Intermittent every 8 hours  polyethylene glycol 3350 17 Gram(s) Oral two times a day  senna 2 Tablet(s) Oral at bedtime  tamsulosin 0.4 milliGRAM(s) Oral at bedtime  vancomycin  IVPB 1000 milliGRAM(s) IV Intermittent every 24 hours    MEDICATIONS  (PRN):  acetaminophen     Tablet .. 650 milliGRAM(s) Oral every 6 hours PRN Mild Pain (1 - 3), Moderate Pain (4 - 6)    Currently Undergoing Physical/ Occupational Therapy at bedside.    Functional Status Assessment:         Therapeutic Interventions      Bed Mobility  Bed Mobility Training Rolling/Turning: moderate assist (50% patient effort);  1 person assist;  Verbal cues for safe log rolling ;  bed rails  Bed Mobility Training Scooting: moderate assist (50% patient effort);  1 person assist;  bed rails  Bed Mobility Training Sit-to-Supine: moderate assist (50% patient effort);  2 person assist;  bed rails  Bed Mobility Training Supine-to-Sit: maximum assist (25% patient effort);  1 person assist;  Verbal cues for safe log rolling ;  bed rails  Bed Mobility Training Limitations: decreased flexibility;  decreased strength;  impaired balance    Sit-Stand Transfer Training  Transfer Training Sit-to-Stand Transfer: minimum assist (75% patient effort);  rolling walker;  Verbal cues for safe hand placement and sequencing. ;  2 person assist  Transfer Training Stand-to-Sit Transfer: minimum assist (75% patient effort);  2 person assist;  Verbal cues for safe hand placement and sequencing.;  rolling walker  Sit-to-Stand Transfer Training Transfer Safety Analysis: decreased sequencing ability;  decreased balance;  decreased weight-shifting ability;  decreased flexibility;  decreased strength;  impaired balance    Gait Training  Gait Training: minimum assist (75% patient effort);  2 person assist;  rolling walker;  ~4 small lateral steps to the left  Gait Analysis: 3-point gait   decreased ethan;  increased time in double stance;  decreased hip/knee flexion;  decreased step length;  decreased stride length;  increased stride width;  decreased weight-shifting ability;  decreased flexibility;  decreased strength;  impaired balance;  impaired postural control    Therapeutic Exercise  Therapeutic Exercise Detail: Ankle pumps, 2x10 each LE, to improve ankle DF ROM for midstance phase of gait and help prevent DVTs;straight leg raises, 1x5 each LE, to improve knee ext strength;Supine bridges, 1x5, to improve glute strength for transfers and stance phase of gait;           PM&R Impression: as above    Current Disposition Plan Recommendations:    subacute rehab placement

## 2022-01-31 NOTE — PROGRESS NOTE ADULT - ATTENDING COMMENTS
Patient discussed with resident team and plan of care reviewed. I have personally reviewed all pertinent labs and imaging and performed an independent history and physical. Resident note personally reviewed, and I agree with above resident note with the following additions:    WBC remains elevated - however patient appears well, symptoms improving, and vitals stable. Given persistently elevated WBC and presence of possibly parapneumonic effusion on CT scan, will get input from pulmonology if needs additional source control vs extended course of abx. If just needs additional abx without need for intervention then can move towards d/c back to [facility].

## 2022-01-31 NOTE — PROGRESS NOTE ADULT - ASSESSMENT
per Internal Medicine    85 yo M w/ PMH CHF (Unknown EF), HTN, CKD (baseline Cr 1.3), BPH presented from [facility] w/ right-sided CP and back bharat along with generalized weakness/poor PO intake, found with sepsis i/s/o asymptomatic COVID and MRSA PNA - downtrending WBC and inflammatory markers, as well as mixed MURRAY, course c/b urinary retention and delirium     Problem/Plan - 1:  ·  Problem: Altered mental status.   ·  Plan: Pt found to be unresponsive on 1/28 just prior to planned DC, gag reflex absent. Stroke code called, stepped up to ICU. CTH negative for stroke. DDX includes seizure. Patient now back to baseline A&Ox2-3  -vEEG: mild generalized slowing without epileptiform activity  -Episode appears transient and patient without new metabolic abnormalities.    Problem/Plan - 2:  ·  Problem: BPH (benign prostatic hyperplasia).   ·  Plan: History of BPH   #Urinary retention  Urology placed waite catheter on 1/26, with significant UOP since    - hold home oxybutynin 5mg, consider restarting once BPs normalize  - c/w Finasteride 5 mg qd  - c/w tamsulosin 0.4 mg qd  - monitor I&Os.    Problem/Plan - 3:  ·  Problem: Acute kidney injury superimposed on CKD.   ·  Plan: on admission with cr 1.59 (baseline cr 1.3) likely in the setting of hypovolemia. Patient with low sBPs 80-90 and reporting decreased PO intake the past few days. Last BM on 1/20. Retaining 525CC on AM bladder scan  Possible 2/2 to poor PO, antibiotics (V/Z), post obstructive given urinary retention (possibly from constipation).   Creatinine down to 1.36 today, was likely obstructive and creatinine downtrending s/p waite  U/S showing R hydro, likely chronic, could be 2/2 obstruction  - Monitor BMs as possible cause of retention  - Trend Cr  - Waite still in place.    Problem/Plan - 4:  ·  Problem: Sepsis due to pneumonia.   ·  Plan: on admission with BP 87/57, WBC 24.53 with neutrophilic predom 87.6%. Lactate 1.0. CXR RLL infiltrate. UA unremarkable. 94% on room air->97% on 2LNC. Right basilar crackles on exam. COVID +, Procal 0.46  MRSA positive, received 7 day course of zosyn and 4 days of vancomycin (d/c 1/26 given clinical improvement and i/s/o worsening renal function)  downtrending WBC but now plateau at 17  SpO2 95% on RA on 1/26/22  CRP downtrending but Ferritin and D dimer increasing  CT Chest: Large right lower lobe consolidation consistent with lobar pneumonia. Small to moderate-sized partially loculated right pleural effusion with pleural thickening suspicious for parapneumonic effusion/empyema.  - will pursue 14 day course of antibiotics  - c/w vancomycin and zosyn  - deescalate to oral regimen at discharge.    Problem/Plan - 5:  ·  Problem: 2019 novel coronavirus disease (COVID-19).   ·  Plan: on admission with BP 87/57, WBC 24.53 with neutrophilic predom 87.6%. Lactate 1.0. CXR RLL infiltrate. UA unremarkable. 94% on room air->97% on 2LNC. Right basilar crackles on exam. COVID +, Procal 0.46  - No need for treatment with DEX or REM  - monitor if need for supplemental oxygen  - Changed lovenox to 25mg BID per  protocol for COVID pts.    Problem/Plan - 6:  ·  Problem: CHF (congestive heart failure).   ·  Plan: reported charted history of CHF, EF unknown, pt denies h/o CHF and reports normal ECHO. BNP 2006. No JVD, crackles, or LE edema on exam. Cardiac enzymes negative. EKG with no acute ischemic changes. CXR with RLL infiltrate and does not appear to be overload in nature  - does not appear to be in exacerbation at this time > euvolemic  - f/u TTE.    Problem/Plan - 7:  ·  Problem: HLD (hyperlipidemia).   ·  Plan: history of HLD  - continue home Rosuvastatin 10mg daily (atorvastatin 40mg daily)      #Delirium  Multifactorial i/s/o sepsis, urinary retention and pain  Pt on restraints starting 1/23 which was switched to mittens 1/24 AM and mittens removed 1/25 AM  - c/w melatonin 10 mg QHS  - can use zyprexa 5 mg IM if severe agitation or pt can harm self or others.    #Right upper extremity infiltration  2/2 to peripheral IV line, switched to left side on 1/25.    Problem/Plan - 8:  ·  Problem: Prophylactic measure.   ·  Plan: F: None  E: Replete K<4, Mg<2  N: DASH/TLC diet  DVT ppx: Lovenox 25mg subq BID  Code: FULL

## 2022-01-31 NOTE — CONSULT NOTE ADULT - SUBJECTIVE AND OBJECTIVE BOX
PULMONARY SERVICE INITIAL CONSULT NOTE    HPI:  86M PMH CHF, HTN, CKD (baseline Cr 1.3), BPH  presents to the ED from [facility] rehab with a complaint of pain when breathing and generalized weakness for the past 2 days. He states that when he takes a deep breath he feel pain on the right side of his chest but otherwise denies nay shortness of breath or pain at rest. He says he has been eating less the past few days and feeling more weak. Patient has been a resident of [facility] for years and has no fevers, rhinorrhea, cough, abdominal pain, n/v/d/c, edema. Patient has been vaccinated x2 for COVID. He is full code.    ED Course:   Vitals: T 98.6F (rectal), HR 86, BP 98/54, RR 18, O2 94% on RA  Labs: WBC 24.53, Neutrophils 87.6%, hb 11.7, plts 166, Lactate 1.0, Na 136, K 4.0, Cl 103, CO2 22, AG 13, BUN/Cr 26/1.59 (cr 1.3 baseline), glucose 122, trops 0.01, BNP 2006, procal 0.46, UA negative, COVID+  EKG: NSR HR 73, no acute ischemic changes, qtc 416  Imaging: CXR RLL infiltrate  Interventions: Vancomycin 1g IV and Zosyn 3.375g IV, (reported 500cc bolus before arrival)   (22 Jan 2022 22:27)    Hospital course:  87 yo M w/ PMH CHF (Unknown EF), HTN, CKD (baseline Cr 1.3), BPH presented from [facility] w/ right-sided CP and back bharat along with generalized weakness/poor PO intake, found with sepsis i/s/o COVID PNA along with RLL CAP - downtrending WBC and inflammatory markers, as well as mixed MURRAY, course c/b urinary retention, s/p waite placement, course c/b delirium. Patient pending discharge but then became unresponsive stroke code called, ICU consult then consulted for concern of inability to protect airway and stroke code called, however patient able to protect airway and CTA head and neck negative. Patient stable for stepdown to F and now with concern of CT chest finding showing small to moderate sized partially loculated right pleural effusion with pleural thickening suspicious for parapneumonic effusion/empyema.      REVIEW OF SYSTEMS:  All additional ROS negative.    PAST MEDICAL & SURGICAL HISTORY:  BPH (benign prostatic hyperplasia)    HLD (hyperlipidemia)    PVD (peripheral vascular disease)    CKD (chronic kidney disease)    Heart failure    H/O hernia repair        FAMILY HISTORY:      SOCIAL HISTORY:  Smoking Status: [ ] Current, [x ] Former, [ ] Never  Pack Years: ~4 years ~1PPD    MEDICATIONS:  Pulmonary:    Antimicrobials:  piperacillin/tazobactam IVPB.. 4.5 Gram(s) IV Intermittent every 8 hours  vancomycin  IVPB 1000 milliGRAM(s) IV Intermittent every 24 hours    Anticoagulants:  enoxaparin Injectable 25 milliGRAM(s) SubCutaneous every 12 hours    Onc:    GI/:  polyethylene glycol 3350 17 Gram(s) Oral two times a day  senna 2 Tablet(s) Oral at bedtime    Endocrine:  atorvastatin 40 milliGRAM(s) Oral at bedtime  finasteride 5 milliGRAM(s) Oral daily    Cardiac:  tamsulosin 0.4 milliGRAM(s) Oral at bedtime    Other Medications:  acetaminophen     Tablet .. 650 milliGRAM(s) Oral every 6 hours PRN  lidocaine   4% Patch 1 Patch Transdermal daily  melatonin 10 milliGRAM(s) Oral at bedtime  multivitamin 1 Tablet(s) Oral daily      Allergies    No Known Allergies    Intolerances        Vital Signs Last 24 Hrs  T(C): 37.1 (31 Jan 2022 06:18), Max: 37.1 (31 Jan 2022 06:18)  T(F): 98.7 (31 Jan 2022 06:18), Max: 98.7 (31 Jan 2022 06:18)  HR: 68 (31 Jan 2022 10:30) (68 - 86)  BP: 101/68 (31 Jan 2022 10:30) (93/57 - 116/89)  BP(mean): --  RR: 18 (31 Jan 2022 06:18) (18 - 18)  SpO2: 95% (31 Jan 2022 10:30) (94% - 96%)    01-30 @ 07:01  -  01-31 @ 07:00  --------------------------------------------------------  IN: 0 mL / OUT: 650 mL / NET: -650 mL          PHYSICAL EXAM:  Constitutional: NAD  Head: NC/AT  EENT: PERRL, anicteric sclera; oropharynx clear, MMM  Neck: supple, no appreciable JVD  Respiratory: CTA B/L but decreased breath sounds right side; no W/R/R  Cardiovascular: +S1/S2, RRR  Gastrointestinal: soft, NT/ND  Extremities: WWP; no edema, clubbing or cyanosis  Vascular: 2+ radial pulses B/L  Neurological: awake and alert; AAOx2-3 (name, location, knows year)    LABS:      CBC Full  -  ( 31 Jan 2022 08:19 )  WBC Count : 18.44 K/uL  RBC Count : 3.78 M/uL  Hemoglobin : 12.0 g/dL  Hematocrit : 37.8 %  Platelet Count - Automated : 272 K/uL  Mean Cell Volume : 100.0 fl  Mean Cell Hemoglobin : 31.7 pg  Mean Cell Hemoglobin Concentration : 31.7 gm/dL  Auto Neutrophil # : 14.21 K/uL  Auto Lymphocyte # : 1.65 K/uL  Auto Monocyte # : 0.88 K/uL  Auto Eosinophil # : 0.27 K/uL  Auto Basophil # : 0.19 K/uL  Auto Neutrophil % : 77.1 %  Auto Lymphocyte % : 8.9 %  Auto Monocyte % : 4.8 %  Auto Eosinophil % : 1.5 %  Auto Basophil % : 1.0 %    01-31    140  |  102  |  19  ----------------------------<  102<H>  3.8   |  26  |  1.22    Ca    8.8      31 Jan 2022 08:19  Phos  2.6     01-31  Mg     2.2     01-31    TPro  6.8  /  Alb  2.6<L>  /  TBili  0.6  /  DBili  x   /  AST  31  /  ALT  22  /  AlkPhos  121<H>  01-31                      RADIOLOGY & ADDITIONAL STUDIES:

## 2022-01-31 NOTE — PROGRESS NOTE ADULT - SUBJECTIVE AND OBJECTIVE BOX
INTERVAL HPI/OVERNIGHT EVENTS:  Patient was seen and examined at bedside. No overnight events, patient without complaints today.    VITAL SIGNS:  T(F): 98.3 (01-31-22 @ 13:42)  HR: 77 (01-31-22 @ 13:42)  BP: 94/59 (01-31-22 @ 13:42)  RR: 18 (01-31-22 @ 13:42)  SpO2: 95% (01-31-22 @ 13:42)  Wt(kg): --    PHYSICAL EXAM:  General: NAD, answering questions, non toxic  HEENT: NC/AT; PERRL, clear conjunctiva  Neck: supple  Respiratory: CTA b/l  Cardiovascular: +S1/S2; RRR  Abdomen: soft, NT/ND; +BS x4  : waite catheter in place, clear yellow urine  Extremities: WWP, 2+ peripheral pulses b/l; no LE edema  Skin: normal color and turgor; no rash, dry skin all over  Neurological: AAOx2-3    MEDICATIONS  (STANDING):  atorvastatin 40 milliGRAM(s) Oral at bedtime  enoxaparin Injectable 25 milliGRAM(s) SubCutaneous every 12 hours  finasteride 5 milliGRAM(s) Oral daily  lidocaine   4% Patch 1 Patch Transdermal daily  melatonin 10 milliGRAM(s) Oral at bedtime  multivitamin 1 Tablet(s) Oral daily  piperacillin/tazobactam IVPB.. 4.5 Gram(s) IV Intermittent every 8 hours  polyethylene glycol 3350 17 Gram(s) Oral two times a day  senna 2 Tablet(s) Oral at bedtime  tamsulosin 0.4 milliGRAM(s) Oral at bedtime  vancomycin  IVPB 1000 milliGRAM(s) IV Intermittent every 24 hours    MEDICATIONS  (PRN):  acetaminophen     Tablet .. 650 milliGRAM(s) Oral every 6 hours PRN Mild Pain (1 - 3), Moderate Pain (4 - 6)      Allergies    No Known Allergies    Intolerances        LABS:                        12.0   18.44 )-----------( 272      ( 31 Jan 2022 08:19 )             37.8     01-31    140  |  102  |  19  ----------------------------<  102<H>  3.8   |  26  |  1.22    Ca    8.8      31 Jan 2022 08:19  Phos  2.6     01-31  Mg     2.2     01-31    TPro  6.8  /  Alb  2.6<L>  /  TBili  0.6  /  DBili  x   /  AST  31  /  ALT  22  /  AlkPhos  121<H>  01-31          RADIOLOGY & ADDITIONAL TESTS:  Reviewed

## 2022-02-01 LAB
ANION GAP SERPL CALC-SCNC: 9 MMOL/L — SIGNIFICANT CHANGE UP (ref 5–17)
BASOPHILS # BLD AUTO: 0.17 K/UL — SIGNIFICANT CHANGE UP (ref 0–0.2)
BASOPHILS NFR BLD AUTO: 1.1 % — SIGNIFICANT CHANGE UP (ref 0–2)
BUN SERPL-MCNC: 16 MG/DL — SIGNIFICANT CHANGE UP (ref 7–23)
CALCIUM SERPL-MCNC: 8.8 MG/DL — SIGNIFICANT CHANGE UP (ref 8.4–10.5)
CHLORIDE SERPL-SCNC: 104 MMOL/L — SIGNIFICANT CHANGE UP (ref 96–108)
CO2 SERPL-SCNC: 27 MMOL/L — SIGNIFICANT CHANGE UP (ref 22–31)
CREAT SERPL-MCNC: 1.2 MG/DL — SIGNIFICANT CHANGE UP (ref 0.5–1.3)
EOSINOPHIL # BLD AUTO: 0.25 K/UL — SIGNIFICANT CHANGE UP (ref 0–0.5)
EOSINOPHIL NFR BLD AUTO: 1.6 % — SIGNIFICANT CHANGE UP (ref 0–6)
GLUCOSE SERPL-MCNC: 100 MG/DL — HIGH (ref 70–99)
HCT VFR BLD CALC: 36.6 % — LOW (ref 39–50)
HGB BLD-MCNC: 11.5 G/DL — LOW (ref 13–17)
IMM GRANULOCYTES NFR BLD AUTO: 8.5 % — HIGH (ref 0–1.5)
LYMPHOCYTES # BLD AUTO: 1.42 K/UL — SIGNIFICANT CHANGE UP (ref 1–3.3)
LYMPHOCYTES # BLD AUTO: 9.3 % — LOW (ref 13–44)
MAGNESIUM SERPL-MCNC: 2.1 MG/DL — SIGNIFICANT CHANGE UP (ref 1.6–2.6)
MCHC RBC-ENTMCNC: 31.3 PG — SIGNIFICANT CHANGE UP (ref 27–34)
MCHC RBC-ENTMCNC: 31.4 GM/DL — LOW (ref 32–36)
MCV RBC AUTO: 99.5 FL — SIGNIFICANT CHANGE UP (ref 80–100)
MONOCYTES # BLD AUTO: 0.83 K/UL — SIGNIFICANT CHANGE UP (ref 0–0.9)
MONOCYTES NFR BLD AUTO: 5.4 % — SIGNIFICANT CHANGE UP (ref 2–14)
NEUTROPHILS # BLD AUTO: 11.29 K/UL — HIGH (ref 1.8–7.4)
NEUTROPHILS NFR BLD AUTO: 74.1 % — SIGNIFICANT CHANGE UP (ref 43–77)
NRBC # BLD: 0 /100 WBCS — SIGNIFICANT CHANGE UP (ref 0–0)
PHOSPHATE SERPL-MCNC: 2.3 MG/DL — LOW (ref 2.5–4.5)
PLATELET # BLD AUTO: 292 K/UL — SIGNIFICANT CHANGE UP (ref 150–400)
POTASSIUM SERPL-MCNC: 3.5 MMOL/L — SIGNIFICANT CHANGE UP (ref 3.5–5.3)
POTASSIUM SERPL-SCNC: 3.5 MMOL/L — SIGNIFICANT CHANGE UP (ref 3.5–5.3)
RBC # BLD: 3.68 M/UL — LOW (ref 4.2–5.8)
RBC # FLD: 14.6 % — HIGH (ref 10.3–14.5)
SODIUM SERPL-SCNC: 140 MMOL/L — SIGNIFICANT CHANGE UP (ref 135–145)
VANCOMYCIN TROUGH SERPL-MCNC: 11.8 UG/ML — SIGNIFICANT CHANGE UP (ref 10–20)
WBC # BLD: 15.26 K/UL — HIGH (ref 3.8–10.5)
WBC # FLD AUTO: 15.26 K/UL — HIGH (ref 3.8–10.5)

## 2022-02-01 PROCEDURE — 93308 TTE F-UP OR LMTD: CPT | Mod: 26

## 2022-02-01 PROCEDURE — 99233 SBSQ HOSP IP/OBS HIGH 50: CPT | Mod: GC

## 2022-02-01 PROCEDURE — 93321 DOPPLER ECHO F-UP/LMTD STD: CPT | Mod: 26

## 2022-02-01 RX ORDER — POTASSIUM CHLORIDE 20 MEQ
20 PACKET (EA) ORAL
Refills: 0 | Status: DISCONTINUED | OUTPATIENT
Start: 2022-02-01 | End: 2022-02-01

## 2022-02-01 RX ORDER — VANCOMYCIN HCL 1 G
250 VIAL (EA) INTRAVENOUS ONCE
Refills: 0 | Status: DISCONTINUED | OUTPATIENT
Start: 2022-02-01 | End: 2022-02-01

## 2022-02-01 RX ORDER — POTASSIUM CHLORIDE 20 MEQ
20 PACKET (EA) ORAL
Refills: 0 | Status: COMPLETED | OUTPATIENT
Start: 2022-02-01 | End: 2022-02-01

## 2022-02-01 RX ADMIN — ATORVASTATIN CALCIUM 40 MILLIGRAM(S): 80 TABLET, FILM COATED ORAL at 21:47

## 2022-02-01 RX ADMIN — ENOXAPARIN SODIUM 25 MILLIGRAM(S): 100 INJECTION SUBCUTANEOUS at 21:47

## 2022-02-01 RX ADMIN — PIPERACILLIN AND TAZOBACTAM 200 GRAM(S): 4; .5 INJECTION, POWDER, LYOPHILIZED, FOR SOLUTION INTRAVENOUS at 05:41

## 2022-02-01 RX ADMIN — TAMSULOSIN HYDROCHLORIDE 0.4 MILLIGRAM(S): 0.4 CAPSULE ORAL at 21:48

## 2022-02-01 RX ADMIN — POLYETHYLENE GLYCOL 3350 17 GRAM(S): 17 POWDER, FOR SOLUTION ORAL at 17:09

## 2022-02-01 RX ADMIN — LIDOCAINE 1 PATCH: 4 CREAM TOPICAL at 23:13

## 2022-02-01 RX ADMIN — Medication 20 MILLIEQUIVALENT(S): at 14:08

## 2022-02-01 RX ADMIN — Medication 1 TABLET(S): at 11:32

## 2022-02-01 RX ADMIN — FINASTERIDE 5 MILLIGRAM(S): 5 TABLET, FILM COATED ORAL at 12:00

## 2022-02-01 RX ADMIN — POLYETHYLENE GLYCOL 3350 17 GRAM(S): 17 POWDER, FOR SOLUTION ORAL at 05:41

## 2022-02-01 RX ADMIN — SENNA PLUS 2 TABLET(S): 8.6 TABLET ORAL at 21:47

## 2022-02-01 RX ADMIN — LIDOCAINE 1 PATCH: 4 CREAM TOPICAL at 18:00

## 2022-02-01 RX ADMIN — PIPERACILLIN AND TAZOBACTAM 200 GRAM(S): 4; .5 INJECTION, POWDER, LYOPHILIZED, FOR SOLUTION INTRAVENOUS at 21:46

## 2022-02-01 RX ADMIN — Medication 250 MILLIGRAM(S): at 12:41

## 2022-02-01 RX ADMIN — Medication 10 MILLIGRAM(S): at 21:47

## 2022-02-01 RX ADMIN — PIPERACILLIN AND TAZOBACTAM 200 GRAM(S): 4; .5 INJECTION, POWDER, LYOPHILIZED, FOR SOLUTION INTRAVENOUS at 14:08

## 2022-02-01 RX ADMIN — LIDOCAINE 1 PATCH: 4 CREAM TOPICAL at 11:33

## 2022-02-01 RX ADMIN — Medication 20 MILLIEQUIVALENT(S): at 17:09

## 2022-02-01 RX ADMIN — ENOXAPARIN SODIUM 25 MILLIGRAM(S): 100 INJECTION SUBCUTANEOUS at 09:04

## 2022-02-01 NOTE — PROGRESS NOTE ADULT - SUBJECTIVE AND OBJECTIVE BOX
INTERVAL HPI/OVERNIGHT EVENTS:  Patient was seen and examined at bedside. No overnight events, patient without complaints, appears stable.     VITAL SIGNS:  T(F): 98.3 (02-01-22 @ 12:29)  HR: 77 (02-01-22 @ 12:29)  BP: 100/66 (02-01-22 @ 12:29)  RR: 18 (02-01-22 @ 12:29)  SpO2: 98% (02-01-22 @ 12:29)  Wt(kg): --    PHYSICAL EXAM:  General: NAD, answering questions, non toxic  HEENT: NC/AT; PERRL, clear conjunctiva  Neck: supple  Respiratory: decreased breath sounds at R base, otherwise clear, no w/r/r  Cardiovascular: +S1/S2; RRR  Abdomen: soft, NT/ND; +BS x4  : waite catheter in place, clear yellow urine  Extremities: WWP, 2+ peripheral pulses b/l; no LE edema  Skin: normal color and turgor; no rash, dry skin all over  Neurological: AAOx2-3    MEDICATIONS  (STANDING):  atorvastatin 40 milliGRAM(s) Oral at bedtime  enoxaparin Injectable 25 milliGRAM(s) SubCutaneous every 12 hours  finasteride 5 milliGRAM(s) Oral daily  lidocaine   4% Patch 1 Patch Transdermal daily  melatonin 10 milliGRAM(s) Oral at bedtime  multivitamin 1 Tablet(s) Oral daily  piperacillin/tazobactam IVPB.. 4.5 Gram(s) IV Intermittent every 8 hours  polyethylene glycol 3350 17 Gram(s) Oral two times a day  senna 2 Tablet(s) Oral at bedtime  tamsulosin 0.4 milliGRAM(s) Oral at bedtime    MEDICATIONS  (PRN):  acetaminophen     Tablet .. 650 milliGRAM(s) Oral every 6 hours PRN Mild Pain (1 - 3), Moderate Pain (4 - 6)      Allergies    No Known Allergies    Intolerances        LABS:                        11.5   15.26 )-----------( 292      ( 01 Feb 2022 09:04 )             36.6     02-01    140  |  104  |  16  ----------------------------<  100<H>  3.5   |  27  |  1.20    Ca    8.8      01 Feb 2022 09:04  Phos  2.3     02-01  Mg     2.1     02-01    TPro  6.8  /  Alb  2.6<L>  /  TBili  0.6  /  DBili  x   /  AST  31  /  ALT  22  /  AlkPhos  121<H>  01-31          RADIOLOGY & ADDITIONAL TESTS:  Reviewed INTERVAL HPI/OVERNIGHT EVENTS:  Patient was seen and examined at bedside. No overnight events, patient without complaints, appears stable.     VITAL SIGNS:  T(F): 98.3 (02-01-22 @ 12:29)  HR: 77 (02-01-22 @ 12:29)  BP: 100/66 (02-01-22 @ 12:29)  RR: 18 (02-01-22 @ 12:29)  SpO2: 98% (02-01-22 @ 12:29)  Wt(kg): --    PHYSICAL EXAM:  General: NAD, answering questions, non toxic  HEENT: NC/AT; PERRL, clear conjunctiva  Neck: supple  Respiratory: decreased breath sounds at R base, otherwise clear, no w/r/r  Cardiovascular: +S1/S2; RRR, high pitched systolic murmur  Abdomen: soft, NT/ND; +BS x4  : waite catheter in place, clear yellow urine  Extremities: WWP, 2+ peripheral pulses b/l; no LE edema  Skin: normal color and turgor; no rash, dry skin all over  Neurological: AAOx2-3    MEDICATIONS  (STANDING):  atorvastatin 40 milliGRAM(s) Oral at bedtime  enoxaparin Injectable 25 milliGRAM(s) SubCutaneous every 12 hours  finasteride 5 milliGRAM(s) Oral daily  lidocaine   4% Patch 1 Patch Transdermal daily  melatonin 10 milliGRAM(s) Oral at bedtime  multivitamin 1 Tablet(s) Oral daily  piperacillin/tazobactam IVPB.. 4.5 Gram(s) IV Intermittent every 8 hours  polyethylene glycol 3350 17 Gram(s) Oral two times a day  senna 2 Tablet(s) Oral at bedtime  tamsulosin 0.4 milliGRAM(s) Oral at bedtime    MEDICATIONS  (PRN):  acetaminophen     Tablet .. 650 milliGRAM(s) Oral every 6 hours PRN Mild Pain (1 - 3), Moderate Pain (4 - 6)      Allergies    No Known Allergies    Intolerances        LABS:                        11.5   15.26 )-----------( 292      ( 01 Feb 2022 09:04 )             36.6     02-01    140  |  104  |  16  ----------------------------<  100<H>  3.5   |  27  |  1.20    Ca    8.8      01 Feb 2022 09:04  Phos  2.3     02-01  Mg     2.1     02-01    TPro  6.8  /  Alb  2.6<L>  /  TBili  0.6  /  DBili  x   /  AST  31  /  ALT  22  /  AlkPhos  121<H>  01-31          RADIOLOGY & ADDITIONAL TESTS:  Reviewed

## 2022-02-01 NOTE — PROGRESS NOTE ADULT - PROBLEM SELECTOR PLAN 8
Tues 7:30 and 7:45 would be good if still open.     F: None  E: Replete K<4, Mg<2  N: DASH/TLC diet  DVT ppx: Lovenox 25mg subq BID  Code: FULL  Dispo: [facility]

## 2022-02-01 NOTE — PROGRESS NOTE ADULT - SUBJECTIVE AND OBJECTIVE BOX
PULMONARY CONSULT SERVICE FOLLOW-UP NOTE    INTERVAL HPI:  Reviewed chart and overnight events; patient seen and examined at bedside. Patient notes having slightly more difficulty breathing today compared to yesterday. Otherwise no new complaints or question. Denies headache, dizziness, fever, chills, chest pain, cough, abd pain.    MEDICATIONS:  Pulmonary:    Antimicrobials:  piperacillin/tazobactam IVPB.. 4.5 Gram(s) IV Intermittent every 8 hours  vancomycin  IVPB 1000 milliGRAM(s) IV Intermittent every 24 hours    Anticoagulants:  enoxaparin Injectable 25 milliGRAM(s) SubCutaneous every 12 hours    Cardiac:  tamsulosin 0.4 milliGRAM(s) Oral at bedtime      Allergies    No Known Allergies    Intolerances        Vital Signs Last 24 Hrs  T(C): 36.7 (01 Feb 2022 06:38), Max: 37.1 (31 Jan 2022 20:33)  T(F): 98.1 (01 Feb 2022 06:38), Max: 98.7 (31 Jan 2022 20:33)  HR: 77 (01 Feb 2022 06:38) (77 - 87)  BP: 107/56 (01 Feb 2022 06:38) (94/59 - 107/56)  BP(mean): --  RR: 17 (01 Feb 2022 06:38) (17 - 18)  SpO2: 94% (01 Feb 2022 06:38) (94% - 95%)    01-31 @ 07:01  -  02-01 @ 07:00  --------------------------------------------------------  IN: 0 mL / OUT: 700 mL / NET: -700 mL          PHYSICAL EXAM:  Constitutional: NAD  HEENT: NC/AT; PERRL, anicteric sclera; dry MM, neck supple  Cardiovascular: +S1/S2, RRR  Respiratory: bilateral decreased breath sounds but no W/R/R  Gastrointestinal: soft, NT/ND  Extremities: WWP; no edema, clubbing or cyanosis  Vascular: 2+ radial pulses B/L  Neurological: awake and alert and AAOx2-3    LABS:      CBC Full  -  ( 01 Feb 2022 09:04 )  WBC Count : 15.26 K/uL  RBC Count : 3.68 M/uL  Hemoglobin : 11.5 g/dL  Hematocrit : 36.6 %  Platelet Count - Automated : 292 K/uL  Mean Cell Volume : 99.5 fl  Mean Cell Hemoglobin : 31.3 pg  Mean Cell Hemoglobin Concentration : 31.4 gm/dL  Auto Neutrophil # : 11.29 K/uL  Auto Lymphocyte # : 1.42 K/uL  Auto Monocyte # : 0.83 K/uL  Auto Eosinophil # : 0.25 K/uL  Auto Basophil # : 0.17 K/uL  Auto Neutrophil % : 74.1 %  Auto Lymphocyte % : 9.3 %  Auto Monocyte % : 5.4 %  Auto Eosinophil % : 1.6 %  Auto Basophil % : 1.1 %    02-01    140  |  104  |  16  ----------------------------<  100<H>  3.5   |  27  |  1.20    Ca    8.8      01 Feb 2022 09:04  Phos  2.3     02-01  Mg     2.1     02-01    TPro  6.8  /  Alb  2.6<L>  /  TBili  0.6  /  DBili  x   /  AST  31  /  ALT  22  /  AlkPhos  121<H>  01-31                      RADIOLOGY & ADDITIONAL STUDIES:

## 2022-02-01 NOTE — PROGRESS NOTE ADULT - PROBLEM SELECTOR PLAN 1
on admission with BP 87/57, WBC 24.53 with neutrophilic predom 87.6%. Lactate 1.0. CXR RLL infiltrate. UA unremarkable. 94% on room air->97% on 2LNC. Right basilar crackles on exam. COVID +, Procal 0.46  MRSA positive, received 7 day course of zosyn and 4 days of vancomycin (d/c 1/26 given clinical improvement and i/s/o worsening renal function)  downtrending WBC now to 15  SpO2 95% on RA on 1/26/22  CRP downtrending but Ferritin and D dimer increasing  CT Chest: Large right lower lobe consolidation consistent with lobar pneumonia. Small to moderate-sized partially loculated right pleural effusion with pleural thickening suspicious for parapneumonic effusion/empyema.  - will pursue 14 day course of antibiotics  - c/w vancomycin and zosyn, vanc trough 2/2 at noon  - deescalate to oral regimen at discharge (doxy, levaquin)  - f/u TTE  - pulm consulted, attempter stacey yesterday, unsuccessful, will try again today

## 2022-02-01 NOTE — PROGRESS NOTE ADULT - ASSESSMENT
85 yo M w/ PMH CHF (Unknown EF), HTN, CKD (baseline Cr 1.3), BPH presented from [facility] w/ right-sided CP and back pain along with generalized weakness/poor PO intake, found with sepsis i/s/o COVID PNA along with RLL CAP - downtrending WBC and inflammatory markers, as well as mixed MURRAY, course c/b urinary retention, s/p waite placement, course c/b delirium. 1/28 patient pending discharge but then became unresponsive stroke code called, ICU consult then consulted for concern of inability to protect airway and stroke code called, however patient able to protect airway and CTA head and neck negative. Patient stable for stepdown to RMF and now with concern of CT chest finding showing small to moderate sized partially loculated right pleural effusion with pleural thickening suspicious for parapneumonic effusion/empyema and pulmonary consulted for management recommendation    #Parapneumonic effusion/empyema  #RLL PNA 2/2 HAP/COVID    On 1/31 could not see window for drainage. Will re-evaluate effusion by ultrasound 2/1      Recommendations:    -c/w course of antibiotic (vancomycin and zosyn) 85 yo M w/ PMH CHF (Unknown EF), HTN, CKD (baseline Cr 1.3), BPH presented from [facility] w/ right-sided CP and back pain along with generalized weakness/poor PO intake, found with sepsis i/s/o COVID PNA along with RLL CAP - downtrending WBC and inflammatory markers, as well as mixed MURRAY, course c/b urinary retention, s/p waite placement, course c/b delirium. 1/28 patient pending discharge but then became unresponsive stroke code called, ICU consult then consulted for concern of inability to protect airway and stroke code called, however patient able to protect airway and CTA head and neck negative. Patient stable for stepdown to RMF and now with concern of CT chest finding showing small to moderate sized partially loculated right pleural effusion with pleural thickening suspicious for parapneumonic effusion/empyema and pulmonary consulted for management recommendation    #Parapneumonic effusion/empyema  #RLL PNA 2/2 HAP/COVID    Swallow bedside assessment 1/26 showed mild oral and suspected pharyngeal dysphagia, rec minced and moist with mildly thick liquids  On 1/31 could not see window for drainage. Will re-evaluate effusion by ultrasound 2/1    Recommendations:  -  -c/w course of antibiotic (vancomycin and zosyn) 85 yo M w/ PMH CHF (Unknown EF), HTN, CKD (baseline Cr 1.3), BPH presented from [facility] w/ right-sided CP and back pain along with generalized weakness/poor PO intake, found with sepsis i/s/o COVID PNA along with RLL CAP - downtrending WBC and inflammatory markers, as well as mixed MURRAY, course c/b urinary retention, s/p waite placement, course c/b delirium. 1/28 patient pending discharge but then became unresponsive stroke code called, ICU consult then consulted for concern of inability to protect airway and stroke code called, however patient able to protect airway and CTA head and neck negative. Patient stable for stepdown to RMF and now with concern of CT chest finding showing small to moderate sized partially loculated right pleural effusion with pleural thickening suspicious for parapneumonic effusion/empyema and pulmonary consulted for management recommendation    #Parapneumonic effusion/empyema  #RLL PNA 2/2 HAP/COVID    Swallow bedside assessment 1/26 showed mild oral and suspected pharyngeal dysphagia, rec minced and moist with mildly thick liquids  On 1/31 could not see window for drainage. Patient refused to talk with team, unable to re-evaluate with ultrasound 2/1.    Recommendations:  -IR guided drainage of effusion  -c/w course of antibiotic (vancomycin and zosyn)

## 2022-02-01 NOTE — PROGRESS NOTE ADULT - ATTENDING COMMENTS
Patient discussed with resident team and plan of care reviewed. I have personally reviewed all pertinent labs and imaging and performed an independent history and physical. Resident note personally reviewed, and I agree with above resident note with the following additions:    Plan for thora per pulm - unable to find good window yesterday, will re-eval today but may need IR guided. Otherwise, WBC actually came down a little today with abx. Once thora is done and stable from that standpoint, then dispo plan will be for [facility].

## 2022-02-02 LAB
ALBUMIN FLD-MCNC: 2.3 G/DL — SIGNIFICANT CHANGE UP
ALBUMIN SERPL ELPH-MCNC: 2.7 G/DL — LOW (ref 3.3–5)
ALP SERPL-CCNC: 115 U/L — SIGNIFICANT CHANGE UP (ref 40–120)
ALT FLD-CCNC: 22 U/L — SIGNIFICANT CHANGE UP (ref 10–45)
ANION GAP SERPL CALC-SCNC: 13 MMOL/L — SIGNIFICANT CHANGE UP (ref 5–17)
ANISOCYTOSIS BLD QL: SLIGHT — SIGNIFICANT CHANGE UP
AST SERPL-CCNC: 32 U/L — SIGNIFICANT CHANGE UP (ref 10–40)
BASOPHILS # BLD AUTO: 0.14 K/UL — SIGNIFICANT CHANGE UP (ref 0–0.2)
BASOPHILS NFR BLD AUTO: 0.9 % — SIGNIFICANT CHANGE UP (ref 0–2)
BILIRUB SERPL-MCNC: 0.6 MG/DL — SIGNIFICANT CHANGE UP (ref 0.2–1.2)
BUN SERPL-MCNC: 17 MG/DL — SIGNIFICANT CHANGE UP (ref 7–23)
CALCIUM SERPL-MCNC: 8.5 MG/DL — SIGNIFICANT CHANGE UP (ref 8.4–10.5)
CHLORIDE SERPL-SCNC: 106 MMOL/L — SIGNIFICANT CHANGE UP (ref 96–108)
CO2 SERPL-SCNC: 23 MMOL/L — SIGNIFICANT CHANGE UP (ref 22–31)
CREAT SERPL-MCNC: 1.24 MG/DL — SIGNIFICANT CHANGE UP (ref 0.5–1.3)
DACRYOCYTES BLD QL SMEAR: SLIGHT — SIGNIFICANT CHANGE UP
EOSINOPHIL # BLD AUTO: 0.14 K/UL — SIGNIFICANT CHANGE UP (ref 0–0.5)
EOSINOPHIL NFR BLD AUTO: 0.9 % — SIGNIFICANT CHANGE UP (ref 0–6)
GIANT PLATELETS BLD QL SMEAR: PRESENT — SIGNIFICANT CHANGE UP
GLUCOSE FLD-MCNC: 93 MG/DL — SIGNIFICANT CHANGE UP
GLUCOSE SERPL-MCNC: 97 MG/DL — SIGNIFICANT CHANGE UP (ref 70–99)
GRAM STN FLD: SIGNIFICANT CHANGE UP
HCT VFR BLD CALC: 38.1 % — LOW (ref 39–50)
HGB BLD-MCNC: 11.4 G/DL — LOW (ref 13–17)
HYPOCHROMIA BLD QL: SLIGHT — SIGNIFICANT CHANGE UP
LDH SERPL L TO P-CCNC: 403 U/L — SIGNIFICANT CHANGE UP
LYMPHOCYTES # BLD AUTO: 0.67 K/UL — LOW (ref 1–3.3)
LYMPHOCYTES # BLD AUTO: 4.4 % — LOW (ref 13–44)
MACROCYTES BLD QL: SLIGHT — SIGNIFICANT CHANGE UP
MAGNESIUM SERPL-MCNC: 2 MG/DL — SIGNIFICANT CHANGE UP (ref 1.6–2.6)
MANUAL SMEAR VERIFICATION: SIGNIFICANT CHANGE UP
MCHC RBC-ENTMCNC: 29.9 GM/DL — LOW (ref 32–36)
MCHC RBC-ENTMCNC: 30.3 PG — SIGNIFICANT CHANGE UP (ref 27–34)
MCV RBC AUTO: 101.3 FL — HIGH (ref 80–100)
METAMYELOCYTES # FLD: 1.7 % — HIGH (ref 0–0)
MONOCYTES # BLD AUTO: 0.67 K/UL — SIGNIFICANT CHANGE UP (ref 0–0.9)
MONOCYTES NFR BLD AUTO: 4.4 % — SIGNIFICANT CHANGE UP (ref 2–14)
MYELOCYTES NFR BLD: 2.6 % — HIGH (ref 0–0)
NEUTROPHILS # BLD AUTO: 12.88 K/UL — HIGH (ref 1.8–7.4)
NEUTROPHILS NFR BLD AUTO: 84.2 % — HIGH (ref 43–77)
OVALOCYTES BLD QL SMEAR: SLIGHT — SIGNIFICANT CHANGE UP
PH, PLEURAL FLUID: 7.78 — SIGNIFICANT CHANGE UP
PHOSPHATE SERPL-MCNC: 2.9 MG/DL — SIGNIFICANT CHANGE UP (ref 2.5–4.5)
PLAT MORPH BLD: ABNORMAL
PLATELET # BLD AUTO: 281 K/UL — SIGNIFICANT CHANGE UP (ref 150–400)
POIKILOCYTOSIS BLD QL AUTO: SLIGHT — SIGNIFICANT CHANGE UP
POLYCHROMASIA BLD QL SMEAR: SLIGHT — SIGNIFICANT CHANGE UP
POTASSIUM SERPL-MCNC: 3.7 MMOL/L — SIGNIFICANT CHANGE UP (ref 3.5–5.3)
POTASSIUM SERPL-SCNC: 3.7 MMOL/L — SIGNIFICANT CHANGE UP (ref 3.5–5.3)
PROMYELOCYTES # FLD: 0.9 % — HIGH (ref 0–0)
PROT FLD-MCNC: 4.4 G/DL — SIGNIFICANT CHANGE UP
PROT SERPL-MCNC: 6.8 G/DL — SIGNIFICANT CHANGE UP (ref 6–8.3)
RBC # BLD: 3.76 M/UL — LOW (ref 4.2–5.8)
RBC # FLD: 14.4 % — SIGNIFICANT CHANGE UP (ref 10.3–14.5)
RBC BLD AUTO: ABNORMAL
SCHISTOCYTES BLD QL AUTO: SLIGHT — SIGNIFICANT CHANGE UP
SODIUM SERPL-SCNC: 142 MMOL/L — SIGNIFICANT CHANGE UP (ref 135–145)
SPECIMEN SOURCE FLD: SIGNIFICANT CHANGE UP
SPECIMEN SOURCE: SIGNIFICANT CHANGE UP
VANCOMYCIN TROUGH SERPL-MCNC: 11.9 UG/ML — SIGNIFICANT CHANGE UP (ref 10–20)
WBC # BLD: 15.3 K/UL — HIGH (ref 3.8–10.5)
WBC # FLD AUTO: 15.3 K/UL — HIGH (ref 3.8–10.5)

## 2022-02-02 PROCEDURE — 99232 SBSQ HOSP IP/OBS MODERATE 35: CPT

## 2022-02-02 PROCEDURE — 32557 INSERT CATH PLEURA W/ IMAGE: CPT | Mod: LT

## 2022-02-02 PROCEDURE — 99447 NTRPROF PH1/NTRNET/EHR 11-20: CPT

## 2022-02-02 RX ORDER — VANCOMYCIN HCL 1 G
1250 VIAL (EA) INTRAVENOUS EVERY 24 HOURS
Refills: 0 | Status: DISCONTINUED | OUTPATIENT
Start: 2022-02-02 | End: 2022-02-04

## 2022-02-02 RX ORDER — VANCOMYCIN HCL 1 G
1500 VIAL (EA) INTRAVENOUS EVERY 24 HOURS
Refills: 0 | Status: DISCONTINUED | OUTPATIENT
Start: 2022-02-02 | End: 2022-02-02

## 2022-02-02 RX ORDER — ENOXAPARIN SODIUM 100 MG/ML
25 INJECTION SUBCUTANEOUS EVERY 12 HOURS
Refills: 0 | Status: DISCONTINUED | OUTPATIENT
Start: 2022-02-03 | End: 2022-02-05

## 2022-02-02 RX ADMIN — Medication 10 MILLIGRAM(S): at 21:58

## 2022-02-02 RX ADMIN — Medication 166.67 MILLIGRAM(S): at 22:00

## 2022-02-02 RX ADMIN — LIDOCAINE 1 PATCH: 4 CREAM TOPICAL at 22:45

## 2022-02-02 RX ADMIN — TAMSULOSIN HYDROCHLORIDE 0.4 MILLIGRAM(S): 0.4 CAPSULE ORAL at 21:59

## 2022-02-02 RX ADMIN — Medication 1 TABLET(S): at 11:18

## 2022-02-02 RX ADMIN — LIDOCAINE 1 PATCH: 4 CREAM TOPICAL at 11:18

## 2022-02-02 RX ADMIN — PIPERACILLIN AND TAZOBACTAM 200 GRAM(S): 4; .5 INJECTION, POWDER, LYOPHILIZED, FOR SOLUTION INTRAVENOUS at 13:22

## 2022-02-02 RX ADMIN — ATORVASTATIN CALCIUM 40 MILLIGRAM(S): 80 TABLET, FILM COATED ORAL at 21:59

## 2022-02-02 RX ADMIN — FINASTERIDE 5 MILLIGRAM(S): 5 TABLET, FILM COATED ORAL at 11:19

## 2022-02-02 RX ADMIN — LIDOCAINE 1 PATCH: 4 CREAM TOPICAL at 18:46

## 2022-02-02 RX ADMIN — SENNA PLUS 2 TABLET(S): 8.6 TABLET ORAL at 21:59

## 2022-02-02 RX ADMIN — PIPERACILLIN AND TAZOBACTAM 200 GRAM(S): 4; .5 INJECTION, POWDER, LYOPHILIZED, FOR SOLUTION INTRAVENOUS at 21:59

## 2022-02-02 RX ADMIN — POLYETHYLENE GLYCOL 3350 17 GRAM(S): 17 POWDER, FOR SOLUTION ORAL at 18:48

## 2022-02-02 NOTE — PROGRESS NOTE ADULT - SUBJECTIVE AND OBJECTIVE BOX
***INCOMPLETE**** OVERNIGHT EVENTS: No acute events overnight.    SUBJECTIVE / INTERVAL HPI: Patient seen and examined at bedside. No complaints.    ROS: negative unless otherwise stated above.    VITAL SIGNS:  Vital Signs Last 24 Hrs  T(C): 37.3 (02 Feb 2022 13:38), Max: 37.5 (02 Feb 2022 06:06)  T(F): 99.2 (02 Feb 2022 13:38), Max: 99.5 (02 Feb 2022 06:06)  HR: 80 (02 Feb 2022 13:38) (79 - 89)  BP: 104/65 (02 Feb 2022 13:38) (104/65 - 114/68)  BP(mean): --  RR: 18 (02 Feb 2022 13:38) (18 - 18)  SpO2: 95% (02 Feb 2022 13:38) (94% - 95%)    PHYSICAL EXAM:  General: Nervous and tremulous. Thin.  HEENT: NC/AT; PERRL, anicteric sclera; MMM  Neck: supple  Cardiovascular: +S1/S2; RRR; high pitched systolic murmur  Respiratory: Decreased breath sounds R base.  Gastrointestinal: soft, NT/ND; +BSx4  Extremities: WWP; no edema, clubbing or cyanosis  Vascular: 2+ radial, DP/PT pulses B/L  Neurological: AAOx3; no focal deficits. Odd affect. Hands jerking occasionally.   Shepard in place.    MEDICATIONS:  MEDICATIONS  (STANDING):  atorvastatin 40 milliGRAM(s) Oral at bedtime  finasteride 5 milliGRAM(s) Oral daily  lidocaine   4% Patch 1 Patch Transdermal daily  melatonin 10 milliGRAM(s) Oral at bedtime  multivitamin 1 Tablet(s) Oral daily  piperacillin/tazobactam IVPB.. 4.5 Gram(s) IV Intermittent every 8 hours  polyethylene glycol 3350 17 Gram(s) Oral two times a day  senna 2 Tablet(s) Oral at bedtime  tamsulosin 0.4 milliGRAM(s) Oral at bedtime  vancomycin  IVPB 1500 milliGRAM(s) IV Intermittent every 24 hours    MEDICATIONS  (PRN):  acetaminophen     Tablet .. 650 milliGRAM(s) Oral every 6 hours PRN Mild Pain (1 - 3), Moderate Pain (4 - 6)      ALLERGIES:  Allergies    No Known Allergies    Intolerances        LABS:                        11.4   15.30 )-----------( 281      ( 02 Feb 2022 07:12 )             38.1     02-02    142  |  106  |  17  ----------------------------<  97  3.7   |  23  |  1.24    Ca    8.5      02 Feb 2022 07:12  Phos  2.9     02-02  Mg     2.0     02-02    TPro  6.8  /  Alb  2.7<L>  /  TBili  0.6  /  DBili  x   /  AST  32  /  ALT  22  /  AlkPhos  115  02-02        CAPILLARY BLOOD GLUCOSE          RADIOLOGY & ADDITIONAL TESTS: Reviewed.

## 2022-02-02 NOTE — PROGRESS NOTE ADULT - PROBLEM SELECTOR PLAN 7
history of HLD  - continue home Rosuvastatin 10mg daily (atorvastatin 40mg daily)    #Delirium  Multifactorial i/s/o sepsis, urinary retention and pain  - c/w melatonin 10 mg QHS  - can use zyprexa 5 mg IM if severe agitation or pt can harm self or others.

## 2022-02-02 NOTE — PROGRESS NOTE ADULT - SUBJECTIVE AND OBJECTIVE BOX
PULMONARY CONSULT SERVICE FOLLOW-UP NOTE    INTERVAL HPI:  Reviewed chart and overnight events; patient seen and examined at bedside. Feels okay and reports breathing is same today as yesterday. Is mildly frustrated that he has not received his peanut butter and jelly sandwich and milk yet. No other complaints. Denies headache, dizziness, fever, chills, chest pain, abd pain, LE pain.    MEDICATIONS:  Pulmonary:    Antimicrobials:  piperacillin/tazobactam IVPB.. 4.5 Gram(s) IV Intermittent every 8 hours  vancomycin  IVPB 1500 milliGRAM(s) IV Intermittent every 24 hours    Anticoagulants:    Cardiac:  tamsulosin 0.4 milliGRAM(s) Oral at bedtime      Allergies    No Known Allergies    Intolerances        Vital Signs Last 24 Hrs  T(C): 37.3 (02 Feb 2022 13:38), Max: 37.5 (02 Feb 2022 06:06)  T(F): 99.2 (02 Feb 2022 13:38), Max: 99.5 (02 Feb 2022 06:06)  HR: 80 (02 Feb 2022 13:38) (79 - 89)  BP: 104/65 (02 Feb 2022 13:38) (104/65 - 114/68)  BP(mean): --  RR: 18 (02 Feb 2022 13:38) (18 - 18)  SpO2: 95% (02 Feb 2022 13:38) (94% - 95%)    02-01 @ 07:01  -  02-02 @ 07:00  --------------------------------------------------------  IN: 0 mL / OUT: 400 mL / NET: -400 mL          PHYSICAL EXAM:  Constitutional: NAD  HEENT: NC/AT; PERRL, anicteric sclera; dry MM, neck supple  Cardiovascular: +S1/S2, RRR  Respiratory: bilateral decreased breath sounds but no W/R/R  Gastrointestinal: soft, NT/ND  Extremities: WWP; no edema, clubbing or cyanosis  Vascular: 2+ radial pulses B/L  Neurological: awake and alert and AAOx2-3    LABS:      CBC Full  -  ( 02 Feb 2022 07:12 )  WBC Count : 15.30 K/uL  RBC Count : 3.76 M/uL  Hemoglobin : 11.4 g/dL  Hematocrit : 38.1 %  Platelet Count - Automated : 281 K/uL  Mean Cell Volume : 101.3 fl  Mean Cell Hemoglobin : 30.3 pg  Mean Cell Hemoglobin Concentration : 29.9 gm/dL  Auto Neutrophil # : 12.88 K/uL  Auto Lymphocyte # : 0.67 K/uL  Auto Monocyte # : 0.67 K/uL  Auto Eosinophil # : 0.14 K/uL  Auto Basophil # : 0.14 K/uL  Auto Neutrophil % : 84.2 %  Auto Lymphocyte % : 4.4 %  Auto Monocyte % : 4.4 %  Auto Eosinophil % : 0.9 %  Auto Basophil % : 0.9 %    02-02    142  |  106  |  17  ----------------------------<  97  3.7   |  23  |  1.24    Ca    8.5      02 Feb 2022 07:12  Phos  2.9     02-02  Mg     2.0     02-02    TPro  6.8  /  Alb  2.7<L>  /  TBili  0.6  /  DBili  x   /  AST  32  /  ALT  22  /  AlkPhos  115  02-02                      RADIOLOGY & ADDITIONAL STUDIES:

## 2022-02-02 NOTE — PROGRESS NOTE ADULT - PROBLEM SELECTOR PLAN 1
on admission with BP 87/57, WBC 24.53 with neutrophilic predom 87.6%. Lactate 1.0. CXR RLL infiltrate. UA unremarkable. 94% on room air->97% on 2LNC. Right basilar crackles on exam. COVID +, Procal 0.46  MRSA nares positive  On Room Air since 1/26  CRP and ferritin downtrended by 1/27  WBC downtrending but still elevated at 15  CT Chest: Large right lower lobe consolidation consistent with lobar pneumonia. Small to moderate-sized partially loculated right pleural effusion with pleural thickening suspicious for parapneumonic effusion/empyema.  TTE done 2/1 - technically difficult study  IR Pigtail placed RLL 2/2    Plan:  - c/w vancomycin and zosyn  - f/u pulm recs

## 2022-02-02 NOTE — PROGRESS NOTE ADULT - PROBLEM SELECTOR PLAN 8
F: None  E: Replete K<4, Mg<2  N: DASH/TLC diet  DVT ppx: Lovenox 25mg subq BID - on HOLD for Pigtail placement.  Code: FULL  Dispo: [facility]

## 2022-02-02 NOTE — PROGRESS NOTE ADULT - PROBLEM SELECTOR PLAN 4
U/S showing R hydro, likely chronic, could be 2/2 obstruction  Creatinine now downtrending   Shepard in place

## 2022-02-02 NOTE — PROGRESS NOTE ADULT - PROBLEM SELECTOR PLAN 5
on admission with BP 87/57, WBC 24.53 with neutrophilic predom 87.6%. Lactate 1.0. CXR RLL infiltrate. UA unremarkable. 94% on room air->97% on 2LNC. Right basilar crackles on exam. COVID +, Procal 0.46    Plan:  - No need for treatment with DEX or REM  - monitor if need for supplemental oxygen  - Changed lovenox to 25mg BID per AC protocol for COVID pts - currently on HOLD for pigtail placement

## 2022-02-02 NOTE — CONSULT NOTE ADULT - REASON FOR ADMISSION
sepsis due to pneumonia

## 2022-02-02 NOTE — CONSULT NOTE ADULT - CONSULT REASON
AMS
parapneumonic effusion/empyema
Rehab evaluation
stroke code, ams
request for right chest tube vs thoracentesis

## 2022-02-02 NOTE — PROGRESS NOTE ADULT - PROBLEM SELECTOR PLAN 6
reported charted history of CHF, EF unknown, pt denies h/o CHF and reports normal ECHO. BNP 2006. No JVD, crackles, or LE edema on exam. Cardiac enzymes negative. EKG with no acute ischemic changes. CXR with RLL infiltrate and does not appear to be overload in nature  Does not appear to be in exacerbation at this time > euvolemic  TTE done 2/1 - technically difficult study

## 2022-02-02 NOTE — CONSULT NOTE ADULT - ASSESSMENT
Assessment: 87 yo M w/ PMH CHF (Unknown EF), HTN, CKD (baseline Cr 1.3), BPH presented from [facility] w/ right-sided CP and back pain along with generalized weakness/poor PO intake, found with sepsis i/s/o COVID PNA along with RLL CAP. CT chest finding showing small to moderate sized partially loculated right pleural effusion with pleural thickening suspicious for parapneumonic effusion/empyema. Pulm unable to visualize pocket bedside. IR consulted for right chest tube vs thoracentesis for source control. Case reviewed with Dr. Fisher, plan for chest tube placement with sedation.     Recommendations: NPO at midnight prior to procedure with sedation. Please ensure Covid swab is up to date (within last 72 hours).    Communicated with: primary team

## 2022-02-02 NOTE — PROGRESS NOTE ADULT - PROBLEM SELECTOR PLAN 3
Pt has history of BPH   Urology placed waite catheter on 1/26, with significant UOP since    - hold home oxybutynin 5mg, consider restarting once BPs normalize  - c/w Finasteride 5 mg qd  - c/w tamsulosin 0.4 mg qd  - monitor I&Os

## 2022-02-02 NOTE — CONSULT NOTE ADULT - SUBJECTIVE AND OBJECTIVE BOX
85 yo M w/ PMH CHF (Unknown EF), HTN, CKD (baseline Cr 1.3), BPH presented from [facility] w/ right-sided CP and back pain along with generalized weakness/poor PO intake, found with sepsis i/s/o COVID PNA along with RLL CAP - downtrending WBC and inflammatory markers, as well as mixed MURRAY, course c/b urinary retention, s/p waite placement, course c/b delirium. 1/28 patient pending discharge but then became unresponsive stroke code called, ICU consult then consulted for concern of inability to protect airway and stroke code called, however patient able to protect airway and CTA head and neck negative. Patient stable for stepdown to [facility] and now with concern of CT chest finding showing small to moderate sized partially loculated right pleural effusion with pleural thickening suspicious for parapneumonic effusion/empyema. Pulm unable to visualize pocket bedside. IR consulted for right chest tube vs thoracentesis for source control.     Clinical History: PNEUMONIA    Handoff    MEWS Score    BPH (benign prostatic hyperplasia)    HLD (hyperlipidemia)    PVD (peripheral vascular disease)    CKD (chronic kidney disease)    Heart failure    Pneumonia    BPH (benign prostatic hyperplasia)    HLD (hyperlipidemia)    CHF (congestive heart failure)    Prophylactic measure    Acute kidney injury superimposed on CKD    Sepsis due to pneumonia    2019 novel coronavirus disease (COVID-19)    Altered mental status    Insertion, needle, vein    No significant past surgical history    H/O hernia repair    CHEST PAIN    90+    2019 novel coronavirus disease (COVID-19)    Urinary retention    [email or web address]        Meds:acetaminophen     Tablet .. 650 milliGRAM(s) Oral every 6 hours PRN  atorvastatin 40 milliGRAM(s) Oral at bedtime  finasteride 5 milliGRAM(s) Oral daily  lidocaine   4% Patch 1 Patch Transdermal daily  melatonin 10 milliGRAM(s) Oral at bedtime  multivitamin 1 Tablet(s) Oral daily  piperacillin/tazobactam IVPB.. 4.5 Gram(s) IV Intermittent every 8 hours  polyethylene glycol 3350 17 Gram(s) Oral two times a day  senna 2 Tablet(s) Oral at bedtime  tamsulosin 0.4 milliGRAM(s) Oral at bedtime      Allergies:No Known Allergies        Labs:                           11.4   15.30 )-----------( 281      ( 02 Feb 2022 07:12 )             38.1       02-02    142  |  106  |  17  ----------------------------<  97  3.7   |  23  |  1.24    Ca    8.5      02 Feb 2022 07:12  Phos  2.9     02-02  Mg     2.0     02-02    TPro  6.8  /  Alb  2.7<L>  /  TBili  0.6  /  DBili  x   /  AST  32  /  ALT  22  /  AlkPhos  115  02-02          Imaging Findings: Small to moderate-sized partially loculated right pleural effusion with pleural thickening suspicious for parapneumonic effusion/empyema.

## 2022-02-02 NOTE — PROGRESS NOTE ADULT - ASSESSMENT
85 yo M w/ PMH CHF (Unknown EF), HTN, CKD (baseline Cr 1.3), BPH presented from [facility] w/ right-sided CP and back pain along with generalized weakness/poor PO intake, found with sepsis i/s/o COVID PNA along with RLL CAP - downtrending WBC and inflammatory markers, as well as mixed MURRAY, course c/b urinary retention, s/p waite placement, course c/b delirium. 1/28 patient pending discharge but then became unresponsive stroke code called, ICU consult then consulted for concern of inability to protect airway and stroke code called, however patient able to protect airway and CTA head and neck negative. Patient stable for stepdown to [facility] and now with concern of CT chest finding showing small to moderate sized partially loculated right pleural effusion with pleural thickening suspicious for parapneumonic effusion/empyema and pulmonary consulted for management recommendation    #Parapneumonic effusion/empyema  #RLL PNA 2/2 HAP/COVID    Swallow bedside assessment 1/26 showed mild oral and suspected pharyngeal dysphagia, rec minced and moist with mildly thick liquids  On 1/31 could not see window for drainage. Patient refused to talk with team, unable to re-evaluate with ultrasound 2/1.    Recommendations:  -IR to place chest tube with sedation  -c/w course of antibiotic (vancomycin and zosyn)

## 2022-02-03 LAB
ALBUMIN SERPL ELPH-MCNC: 2.5 G/DL — LOW (ref 3.3–5)
ALP SERPL-CCNC: 108 U/L — SIGNIFICANT CHANGE UP (ref 40–120)
ALT FLD-CCNC: 21 U/L — SIGNIFICANT CHANGE UP (ref 10–45)
ANION GAP SERPL CALC-SCNC: 11 MMOL/L — SIGNIFICANT CHANGE UP (ref 5–17)
AST SERPL-CCNC: 31 U/L — SIGNIFICANT CHANGE UP (ref 10–40)
B PERT IGG+IGM PNL SER: SIGNIFICANT CHANGE UP
BASOPHILS # BLD AUTO: 0.18 K/UL — SIGNIFICANT CHANGE UP (ref 0–0.2)
BASOPHILS NFR BLD AUTO: 1.4 % — SIGNIFICANT CHANGE UP (ref 0–2)
BILIRUB SERPL-MCNC: 0.5 MG/DL — SIGNIFICANT CHANGE UP (ref 0.2–1.2)
BUN SERPL-MCNC: 17 MG/DL — SIGNIFICANT CHANGE UP (ref 7–23)
CALCIUM SERPL-MCNC: 8.7 MG/DL — SIGNIFICANT CHANGE UP (ref 8.4–10.5)
CHLORIDE SERPL-SCNC: 104 MMOL/L — SIGNIFICANT CHANGE UP (ref 96–108)
CO2 SERPL-SCNC: 24 MMOL/L — SIGNIFICANT CHANGE UP (ref 22–31)
COLOR FLD: YELLOW — SIGNIFICANT CHANGE UP
COMMENT - FLUIDS: SIGNIFICANT CHANGE UP
CREAT SERPL-MCNC: 1.18 MG/DL — SIGNIFICANT CHANGE UP (ref 0.5–1.3)
EOSINOPHIL # BLD AUTO: 0.22 K/UL — SIGNIFICANT CHANGE UP (ref 0–0.5)
EOSINOPHIL NFR BLD AUTO: 1.7 % — SIGNIFICANT CHANGE UP (ref 0–6)
FLUID INTAKE SUBSTANCE CLASS: SIGNIFICANT CHANGE UP
FLUID SEGMENTED GRANULOCYTES: 68 % — SIGNIFICANT CHANGE UP
GLUCOSE SERPL-MCNC: 93 MG/DL — SIGNIFICANT CHANGE UP (ref 70–99)
HCT VFR BLD CALC: 38.5 % — LOW (ref 39–50)
HGB BLD-MCNC: 11.9 G/DL — LOW (ref 13–17)
IMM GRANULOCYTES NFR BLD AUTO: 7.3 % — HIGH (ref 0–1.5)
LYMPHOCYTES # BLD AUTO: 1.33 K/UL — SIGNIFICANT CHANGE UP (ref 1–3.3)
LYMPHOCYTES # BLD AUTO: 10.4 % — LOW (ref 13–44)
LYMPHOCYTES # FLD: 10 % — SIGNIFICANT CHANGE UP
MAGNESIUM SERPL-MCNC: 2.1 MG/DL — SIGNIFICANT CHANGE UP (ref 1.6–2.6)
MCHC RBC-ENTMCNC: 30.9 GM/DL — LOW (ref 32–36)
MCHC RBC-ENTMCNC: 31.4 PG — SIGNIFICANT CHANGE UP (ref 27–34)
MCV RBC AUTO: 101.6 FL — HIGH (ref 80–100)
MESOTHL CELL # FLD: 2 % — SIGNIFICANT CHANGE UP
MONOCYTES # BLD AUTO: 0.84 K/UL — SIGNIFICANT CHANGE UP (ref 0–0.9)
MONOCYTES NFR BLD AUTO: 6.6 % — SIGNIFICANT CHANGE UP (ref 2–14)
MONOS+MACROS # FLD: 20 % — SIGNIFICANT CHANGE UP
NEUTROPHILS # BLD AUTO: 9.29 K/UL — HIGH (ref 1.8–7.4)
NEUTROPHILS NFR BLD AUTO: 72.6 % — SIGNIFICANT CHANGE UP (ref 43–77)
NRBC # BLD: 0 /100 WBCS — SIGNIFICANT CHANGE UP (ref 0–0)
PHOSPHATE SERPL-MCNC: 2.5 MG/DL — SIGNIFICANT CHANGE UP (ref 2.5–4.5)
PLATELET # BLD AUTO: 283 K/UL — SIGNIFICANT CHANGE UP (ref 150–400)
POTASSIUM SERPL-MCNC: 3.8 MMOL/L — SIGNIFICANT CHANGE UP (ref 3.5–5.3)
POTASSIUM SERPL-SCNC: 3.8 MMOL/L — SIGNIFICANT CHANGE UP (ref 3.5–5.3)
PROT SERPL-MCNC: 6.7 G/DL — SIGNIFICANT CHANGE UP (ref 6–8.3)
RBC # BLD: 3.79 M/UL — LOW (ref 4.2–5.8)
RBC # FLD: 14.6 % — HIGH (ref 10.3–14.5)
RCV VOL RI: HIGH /UL (ref 0–0)
SODIUM SERPL-SCNC: 139 MMOL/L — SIGNIFICANT CHANGE UP (ref 135–145)
SPECIMEN SOURCE FLD: SIGNIFICANT CHANGE UP
TOTAL NUCLEATED CELL COUNT, BODY FLUID: 790 /UL — SIGNIFICANT CHANGE UP
TUBE TYPE: SIGNIFICANT CHANGE UP
WBC # BLD: 12.8 K/UL — HIGH (ref 3.8–10.5)
WBC # FLD AUTO: 12.8 K/UL — HIGH (ref 3.8–10.5)

## 2022-02-03 PROCEDURE — 99233 SBSQ HOSP IP/OBS HIGH 50: CPT | Mod: GC

## 2022-02-03 RX ADMIN — PIPERACILLIN AND TAZOBACTAM 200 GRAM(S): 4; .5 INJECTION, POWDER, LYOPHILIZED, FOR SOLUTION INTRAVENOUS at 22:52

## 2022-02-03 RX ADMIN — LIDOCAINE 1 PATCH: 4 CREAM TOPICAL at 11:58

## 2022-02-03 RX ADMIN — TAMSULOSIN HYDROCHLORIDE 0.4 MILLIGRAM(S): 0.4 CAPSULE ORAL at 22:53

## 2022-02-03 RX ADMIN — FINASTERIDE 5 MILLIGRAM(S): 5 TABLET, FILM COATED ORAL at 11:57

## 2022-02-03 RX ADMIN — LIDOCAINE 1 PATCH: 4 CREAM TOPICAL at 18:25

## 2022-02-03 RX ADMIN — Medication 10 MILLIGRAM(S): at 22:52

## 2022-02-03 RX ADMIN — ENOXAPARIN SODIUM 25 MILLIGRAM(S): 100 INJECTION SUBCUTANEOUS at 09:02

## 2022-02-03 RX ADMIN — ENOXAPARIN SODIUM 25 MILLIGRAM(S): 100 INJECTION SUBCUTANEOUS at 22:52

## 2022-02-03 RX ADMIN — LIDOCAINE 1 PATCH: 4 CREAM TOPICAL at 23:45

## 2022-02-03 RX ADMIN — Medication 1 TABLET(S): at 11:57

## 2022-02-03 RX ADMIN — POLYETHYLENE GLYCOL 3350 17 GRAM(S): 17 POWDER, FOR SOLUTION ORAL at 05:59

## 2022-02-03 RX ADMIN — ATORVASTATIN CALCIUM 40 MILLIGRAM(S): 80 TABLET, FILM COATED ORAL at 22:53

## 2022-02-03 RX ADMIN — SENNA PLUS 2 TABLET(S): 8.6 TABLET ORAL at 22:52

## 2022-02-03 RX ADMIN — PIPERACILLIN AND TAZOBACTAM 200 GRAM(S): 4; .5 INJECTION, POWDER, LYOPHILIZED, FOR SOLUTION INTRAVENOUS at 06:02

## 2022-02-03 RX ADMIN — PIPERACILLIN AND TAZOBACTAM 200 GRAM(S): 4; .5 INJECTION, POWDER, LYOPHILIZED, FOR SOLUTION INTRAVENOUS at 14:45

## 2022-02-03 RX ADMIN — POLYETHYLENE GLYCOL 3350 17 GRAM(S): 17 POWDER, FOR SOLUTION ORAL at 17:39

## 2022-02-03 NOTE — PROGRESS NOTE ADULT - SUBJECTIVE AND OBJECTIVE BOX
***INCOMPLETE****  OVERNIGHT EVENTS:    SUBJECTIVE / INTERVAL HPI: Patient seen and examined at bedside.     ROS: negative unless otherwise stated above.    VITAL SIGNS:  Vital Signs Last 24 Hrs  T(C): 36.4 (03 Feb 2022 05:58), Max: 37.3 (02 Feb 2022 13:38)  T(F): 97.6 (03 Feb 2022 05:58), Max: 99.2 (02 Feb 2022 13:38)  HR: 69 (03 Feb 2022 05:58) (69 - 80)  BP: 102/59 (03 Feb 2022 05:58) (94/54 - 104/65)  BP(mean): --  RR: 18 (03 Feb 2022 05:58) (18 - 18)  SpO2: 97% (03 Feb 2022 05:58) (95% - 97%)    PHYSICAL EXAM:    General: In no apparent distress  HEENT: NC/AT; PERRL, anicteric sclera; MMM  Neck: supple  Cardiovascular: +S1/S2; RRR  Respiratory: CTA B/L; no W/R/R  Gastrointestinal: soft, NT/ND; +BSx4  Extremities: WWP; no edema, clubbing or cyanosis  Vascular: 2+ radial, DP/PT pulses B/L  Neurological: AAOx3; no focal deficits    MEDICATIONS:  MEDICATIONS  (STANDING):  atorvastatin 40 milliGRAM(s) Oral at bedtime  enoxaparin Injectable 25 milliGRAM(s) SubCutaneous every 12 hours  finasteride 5 milliGRAM(s) Oral daily  lidocaine   4% Patch 1 Patch Transdermal daily  melatonin 10 milliGRAM(s) Oral at bedtime  multivitamin 1 Tablet(s) Oral daily  piperacillin/tazobactam IVPB.. 4.5 Gram(s) IV Intermittent every 8 hours  polyethylene glycol 3350 17 Gram(s) Oral two times a day  senna 2 Tablet(s) Oral at bedtime  tamsulosin 0.4 milliGRAM(s) Oral at bedtime  vancomycin  IVPB 1250 milliGRAM(s) IV Intermittent every 24 hours    MEDICATIONS  (PRN):  acetaminophen     Tablet .. 650 milliGRAM(s) Oral every 6 hours PRN Mild Pain (1 - 3), Moderate Pain (4 - 6)      ALLERGIES:  Allergies    No Known Allergies    Intolerances        LABS:                        11.4   15.30 )-----------( 281      ( 02 Feb 2022 07:12 )             38.1     02-02    142  |  106  |  17  ----------------------------<  97  3.7   |  23  |  1.24    Ca    8.5      02 Feb 2022 07:12  Phos  2.9     02-02  Mg     2.0     02-02    TPro  6.8  /  Alb  2.7<L>  /  TBili  0.6  /  DBili  x   /  AST  32  /  ALT  22  /  AlkPhos  115  02-02        CAPILLARY BLOOD GLUCOSE          RADIOLOGY & ADDITIONAL TESTS: Reviewed. OVERNIGHT EVENTS: No acute events overnight.    SUBJECTIVE / INTERVAL HPI: Patient seen and examined at bedside. No new complaints.    ROS: negative unless otherwise stated above.    VITAL SIGNS:  Vital Signs Last 24 Hrs  T(C): 36.4 (03 Feb 2022 05:58), Max: 37.3 (02 Feb 2022 13:38)  T(F): 97.6 (03 Feb 2022 05:58), Max: 99.2 (02 Feb 2022 13:38)  HR: 69 (03 Feb 2022 05:58) (69 - 80)  BP: 102/59 (03 Feb 2022 05:58) (94/54 - 104/65)  BP(mean): --  RR: 18 (03 Feb 2022 05:58) (18 - 18)  SpO2: 97% (03 Feb 2022 05:58) (95% - 97%)    PHYSICAL EXAM:  General: Nervous and tremulous. Thin.  HEENT: NC/AT; PERRL, anicteric sclera; MMM  Neck: supple  Cardiovascular: +S1/S2; RRR; high pitched systolic murmur  Respiratory: Decreased breath sounds R base.  Gastrointestinal: soft, NT/ND; +BSx4  Extremities: WWP; no edema, clubbing or cyanosis  Vascular: 2+ radial, DP/PT pulses B/L  Neurological: AAOx3; no focal deficits. Odd affect. Hands jerking occasionally.   Shepard in place.  Pigtail right sided chest tube in place, draining clear serous liquid.    MEDICATIONS:  MEDICATIONS  (STANDING):  atorvastatin 40 milliGRAM(s) Oral at bedtime  enoxaparin Injectable 25 milliGRAM(s) SubCutaneous every 12 hours  finasteride 5 milliGRAM(s) Oral daily  lidocaine   4% Patch 1 Patch Transdermal daily  melatonin 10 milliGRAM(s) Oral at bedtime  multivitamin 1 Tablet(s) Oral daily  piperacillin/tazobactam IVPB.. 4.5 Gram(s) IV Intermittent every 8 hours  polyethylene glycol 3350 17 Gram(s) Oral two times a day  senna 2 Tablet(s) Oral at bedtime  tamsulosin 0.4 milliGRAM(s) Oral at bedtime  vancomycin  IVPB 1250 milliGRAM(s) IV Intermittent every 24 hours    MEDICATIONS  (PRN):  acetaminophen     Tablet .. 650 milliGRAM(s) Oral every 6 hours PRN Mild Pain (1 - 3), Moderate Pain (4 - 6)      ALLERGIES:  Allergies    No Known Allergies    Intolerances        LABS:                        11.4   15.30 )-----------( 281      ( 02 Feb 2022 07:12 )             38.1     02-02    142  |  106  |  17  ----------------------------<  97  3.7   |  23  |  1.24    Ca    8.5      02 Feb 2022 07:12  Phos  2.9     02-02  Mg     2.0     02-02    TPro  6.8  /  Alb  2.7<L>  /  TBili  0.6  /  DBili  x   /  AST  32  /  ALT  22  /  AlkPhos  115  02-02        CAPILLARY BLOOD GLUCOSE          RADIOLOGY & ADDITIONAL TESTS: Reviewed.

## 2022-02-03 NOTE — PROGRESS NOTE ADULT - PROBLEM SELECTOR PLAN 1
on admission with BP 87/57, WBC 24.53 with neutrophilic predom 87.6%. Lactate 1.0. CXR RLL infiltrate. UA unremarkable. 94% on room air->97% on 2LNC. Right basilar crackles on exam. COVID +, Procal 0.46  MRSA nares positive  On Room Air since 1/26  CRP and ferritin downtrended by 1/27  WBC downtrending but still elevated at 15  CT Chest: Large right lower lobe consolidation consistent with lobar pneumonia. Small to moderate-sized partially loculated right pleural effusion with pleural thickening suspicious for parapneumonic effusion/empyema.  TTE done 2/1 - technically difficult study  IR Pigtail placed RLL 2/2    Plan:  - c/w vancomycin and zosyn  - f/u pulm recs on admission with BP 87/57, WBC 24.53 with neutrophilic predom 87.6%. Lactate 1.0. CXR RLL infiltrate. UA unremarkable. 94% on room air->97% on 2LNC. Right basilar crackles on exam. COVID +, Procal 0.46  MRSA nares positive  On Room Air since 1/26  CRP and ferritin downtrended by 1/27  WBC downtrending but still elevated at 15  CT Chest: Large right lower lobe consolidation consistent with lobar pneumonia. Small to moderate-sized partially loculated right pleural effusion with pleural thickening suspicious for parapneumonic effusion/empyema.  TTE done 2/1 - technically difficult study  IR Pigtail placed RLL 2/2    Plan:  - c/w vanc and zosyn  - f/u pulm recs

## 2022-02-03 NOTE — PROGRESS NOTE ADULT - ATTENDING COMMENTS
Patient discussed with resident team and plan of care reviewed. I have personally reviewed all pertinent labs and imaging and performed an independent history and physical. Resident note personally reviewed, and I agree with above resident note with the following additions:    s/p chest tube with IR 2/2, WBC improved. Monitor chest tube output, management per IR. Will continue extended course of abx. Dispo back to [facility] pending removal of chest tube.

## 2022-02-04 LAB
ALBUMIN SERPL ELPH-MCNC: 2.8 G/DL — LOW (ref 3.3–5)
ALP SERPL-CCNC: 89 U/L — SIGNIFICANT CHANGE UP (ref 40–120)
ALT FLD-CCNC: 17 U/L — SIGNIFICANT CHANGE UP (ref 10–45)
ANION GAP SERPL CALC-SCNC: 8 MMOL/L — SIGNIFICANT CHANGE UP (ref 5–17)
AST SERPL-CCNC: 21 U/L — SIGNIFICANT CHANGE UP (ref 10–40)
BASOPHILS # BLD AUTO: 0.17 K/UL — SIGNIFICANT CHANGE UP (ref 0–0.2)
BASOPHILS NFR BLD AUTO: 1.4 % — SIGNIFICANT CHANGE UP (ref 0–2)
BILIRUB SERPL-MCNC: 0.5 MG/DL — SIGNIFICANT CHANGE UP (ref 0.2–1.2)
BUN SERPL-MCNC: 17 MG/DL — SIGNIFICANT CHANGE UP (ref 7–23)
CALCIUM SERPL-MCNC: 9 MG/DL — SIGNIFICANT CHANGE UP (ref 8.4–10.5)
CHLORIDE SERPL-SCNC: 103 MMOL/L — SIGNIFICANT CHANGE UP (ref 96–108)
CO2 SERPL-SCNC: 29 MMOL/L — SIGNIFICANT CHANGE UP (ref 22–31)
CREAT SERPL-MCNC: 1.31 MG/DL — HIGH (ref 0.5–1.3)
EOSINOPHIL # BLD AUTO: 0.26 K/UL — SIGNIFICANT CHANGE UP (ref 0–0.5)
EOSINOPHIL NFR BLD AUTO: 2.2 % — SIGNIFICANT CHANGE UP (ref 0–6)
GLUCOSE SERPL-MCNC: 102 MG/DL — HIGH (ref 70–99)
HCT VFR BLD CALC: 38.7 % — LOW (ref 39–50)
HGB BLD-MCNC: 11.7 G/DL — LOW (ref 13–17)
IMM GRANULOCYTES NFR BLD AUTO: 7.9 % — HIGH (ref 0–1.5)
LYMPHOCYTES # BLD AUTO: 1.15 K/UL — SIGNIFICANT CHANGE UP (ref 1–3.3)
LYMPHOCYTES # BLD AUTO: 9.5 % — LOW (ref 13–44)
MCHC RBC-ENTMCNC: 30.2 GM/DL — LOW (ref 32–36)
MCHC RBC-ENTMCNC: 30.5 PG — SIGNIFICANT CHANGE UP (ref 27–34)
MCV RBC AUTO: 100.8 FL — HIGH (ref 80–100)
MONOCYTES # BLD AUTO: 0.9 K/UL — SIGNIFICANT CHANGE UP (ref 0–0.9)
MONOCYTES NFR BLD AUTO: 7.5 % — SIGNIFICANT CHANGE UP (ref 2–14)
NEUTROPHILS # BLD AUTO: 8.65 K/UL — HIGH (ref 1.8–7.4)
NEUTROPHILS NFR BLD AUTO: 71.5 % — SIGNIFICANT CHANGE UP (ref 43–77)
NRBC # BLD: 0 /100 WBCS — SIGNIFICANT CHANGE UP (ref 0–0)
PLATELET # BLD AUTO: 294 K/UL — SIGNIFICANT CHANGE UP (ref 150–400)
POTASSIUM SERPL-MCNC: 4.5 MMOL/L — SIGNIFICANT CHANGE UP (ref 3.5–5.3)
POTASSIUM SERPL-SCNC: 4.5 MMOL/L — SIGNIFICANT CHANGE UP (ref 3.5–5.3)
PROT SERPL-MCNC: 6.7 G/DL — SIGNIFICANT CHANGE UP (ref 6–8.3)
RBC # BLD: 3.84 M/UL — LOW (ref 4.2–5.8)
RBC # FLD: 14.7 % — HIGH (ref 10.3–14.5)
SODIUM SERPL-SCNC: 140 MMOL/L — SIGNIFICANT CHANGE UP (ref 135–145)
WBC # BLD: 12.08 K/UL — HIGH (ref 3.8–10.5)
WBC # FLD AUTO: 12.08 K/UL — HIGH (ref 3.8–10.5)

## 2022-02-04 PROCEDURE — 99232 SBSQ HOSP IP/OBS MODERATE 35: CPT | Mod: GC

## 2022-02-04 PROCEDURE — 99233 SBSQ HOSP IP/OBS HIGH 50: CPT | Mod: GC

## 2022-02-04 PROCEDURE — 71045 X-RAY EXAM CHEST 1 VIEW: CPT | Mod: 26

## 2022-02-04 RX ORDER — OXYBUTYNIN CHLORIDE 5 MG
1 TABLET ORAL
Qty: 0 | Refills: 0 | DISCHARGE

## 2022-02-04 RX ORDER — CIPROFLOXACIN LACTATE 400MG/40ML
1 VIAL (ML) INTRAVENOUS
Qty: 0 | Refills: 0 | DISCHARGE
Start: 2022-02-04

## 2022-02-04 RX ADMIN — Medication 1 TABLET(S): at 12:03

## 2022-02-04 RX ADMIN — ATORVASTATIN CALCIUM 40 MILLIGRAM(S): 80 TABLET, FILM COATED ORAL at 22:37

## 2022-02-04 RX ADMIN — FINASTERIDE 5 MILLIGRAM(S): 5 TABLET, FILM COATED ORAL at 12:03

## 2022-02-04 RX ADMIN — Medication 100 MILLIGRAM(S): at 22:37

## 2022-02-04 RX ADMIN — PIPERACILLIN AND TAZOBACTAM 200 GRAM(S): 4; .5 INJECTION, POWDER, LYOPHILIZED, FOR SOLUTION INTRAVENOUS at 05:51

## 2022-02-04 RX ADMIN — Medication 650 MILLIGRAM(S): at 05:51

## 2022-02-04 RX ADMIN — ENOXAPARIN SODIUM 25 MILLIGRAM(S): 100 INJECTION SUBCUTANEOUS at 22:36

## 2022-02-04 RX ADMIN — Medication 166.67 MILLIGRAM(S): at 00:28

## 2022-02-04 RX ADMIN — POLYETHYLENE GLYCOL 3350 17 GRAM(S): 17 POWDER, FOR SOLUTION ORAL at 05:51

## 2022-02-04 RX ADMIN — Medication 10 MILLIGRAM(S): at 22:37

## 2022-02-04 RX ADMIN — TAMSULOSIN HYDROCHLORIDE 0.4 MILLIGRAM(S): 0.4 CAPSULE ORAL at 22:37

## 2022-02-04 RX ADMIN — Medication 650 MILLIGRAM(S): at 06:51

## 2022-02-04 RX ADMIN — ENOXAPARIN SODIUM 25 MILLIGRAM(S): 100 INJECTION SUBCUTANEOUS at 09:16

## 2022-02-04 RX ADMIN — SENNA PLUS 2 TABLET(S): 8.6 TABLET ORAL at 22:37

## 2022-02-04 RX ADMIN — PIPERACILLIN AND TAZOBACTAM 200 GRAM(S): 4; .5 INJECTION, POWDER, LYOPHILIZED, FOR SOLUTION INTRAVENOUS at 13:31

## 2022-02-04 NOTE — PROGRESS NOTE ADULT - PROBLEM SELECTOR PROBLEM 5
2019 novel coronavirus disease (COVID-19)
2019 novel coronavirus disease (COVID-19)
BPH (benign prostatic hyperplasia)
2019 novel coronavirus disease (COVID-19)
2019 novel coronavirus disease (COVID-19)
BPH (benign prostatic hyperplasia)
2019 novel coronavirus disease (COVID-19)
2019 novel coronavirus disease (COVID-19)
BPH (benign prostatic hyperplasia)
CHF (congestive heart failure)
CHF (congestive heart failure)
2019 novel coronavirus disease (COVID-19)
BPH (benign prostatic hyperplasia)

## 2022-02-04 NOTE — PROGRESS NOTE ADULT - PROBLEM SELECTOR PROBLEM 7
Prophylactic measure
HLD (hyperlipidemia)
HLD (hyperlipidemia)
Prophylactic measure
HLD (hyperlipidemia)
Prophylactic measure
HLD (hyperlipidemia)
Prophylactic measure
HLD (hyperlipidemia)

## 2022-02-04 NOTE — PROGRESS NOTE ADULT - SUBJECTIVE AND OBJECTIVE BOX
PULMONARY CONSULT SERVICE FOLLOW-UP NOTE    INTERVAL HPI:  Reviewed chart and overnight events; patient seen and examined at bedside. Reports feeling okay today without SOB or cough and without complaint. No pain at chest tube site. Denies fever, chills, chest pain, abd pain, LE pain, or any new weakness.    MEDICATIONS:  Pulmonary:    Antimicrobials:  doxycycline hyclate Capsule 100 milliGRAM(s) Oral every 12 hours  levoFLOXacin  Tablet 750 milliGRAM(s) Oral every 48 hours    Anticoagulants:  enoxaparin Injectable 25 milliGRAM(s) SubCutaneous every 12 hours    Cardiac:  tamsulosin 0.4 milliGRAM(s) Oral at bedtime      Allergies    No Known Allergies    Intolerances        Vital Signs Last 24 Hrs  T(C): 36.4 (04 Feb 2022 09:22), Max: 36.8 (03 Feb 2022 20:21)  T(F): 97.5 (04 Feb 2022 09:22), Max: 98.3 (03 Feb 2022 20:21)  HR: 70 (04 Feb 2022 09:22) (70 - 77)  BP: 94/57 (04 Feb 2022 09:22) (94/57 - 100/61)  BP(mean): --  RR: 18 (04 Feb 2022 09:22) (17 - 18)  SpO2: 96% (04 Feb 2022 09:22) (93% - 96%)    02-03 @ 07:01 - 02-04 @ 07:00  --------------------------------------------------------  IN: 0 mL / OUT: 60 mL / NET: -60 mL    02-04 @ 07:01  -  02-04 @ 14:47  --------------------------------------------------------  IN: 0 mL / OUT: 70 mL / NET: -70 mL          PHYSICAL EXAM:  Constitutional: Elderly frail appearing male lying in bed in no acute distress and comfortable  HEENT: NC/AT; PERRL, anicteric sclera; MMM, neck suple  Cardiovascular: +S1/S2, RRR  Respiratory: CTA B/L; no W/R/R, right chest tube in place c/d/i  Gastrointestinal: soft, NT/ND  Extremities: WWP; no edema, clubbing or cyanosis  Vascular: 2+ radial pulses B/L  Neurological: awake and alert; MOE    LABS:      CBC Full  -  ( 04 Feb 2022 11:27 )  WBC Count : 12.08 K/uL  RBC Count : 3.84 M/uL  Hemoglobin : 11.7 g/dL  Hematocrit : 38.7 %  Platelet Count - Automated : 294 K/uL  Mean Cell Volume : 100.8 fl  Mean Cell Hemoglobin : 30.5 pg  Mean Cell Hemoglobin Concentration : 30.2 gm/dL  Auto Neutrophil # : 8.65 K/uL  Auto Lymphocyte # : 1.15 K/uL  Auto Monocyte # : 0.90 K/uL  Auto Eosinophil # : 0.26 K/uL  Auto Basophil # : 0.17 K/uL  Auto Neutrophil % : 71.5 %  Auto Lymphocyte % : 9.5 %  Auto Monocyte % : 7.5 %  Auto Eosinophil % : 2.2 %  Auto Basophil % : 1.4 %    02-04    140  |  103  |  17  ----------------------------<  102<H>  4.5   |  29  |  1.31<H>    Ca    9.0      04 Feb 2022 11:27  Phos  2.5     02-03  Mg     2.1     02-03    TPro  6.7  /  Alb  2.8<L>  /  TBili  0.5  /  DBili  x   /  AST  21  /  ALT  17  /  AlkPhos  89  02-04                      RADIOLOGY & ADDITIONAL STUDIES:

## 2022-02-04 NOTE — PROGRESS NOTE ADULT - PROBLEM SELECTOR PROBLEM 6
HLD (hyperlipidemia)
CHF (congestive heart failure)
HLD (hyperlipidemia)
CHF (congestive heart failure)
CHF (congestive heart failure)
HLD (hyperlipidemia)
CHF (congestive heart failure)
HLD (hyperlipidemia)
CHF (congestive heart failure)

## 2022-02-04 NOTE — PROGRESS NOTE ADULT - PROBLEM SELECTOR PROBLEM 1
Altered mental status
Sepsis due to pneumonia
Altered mental status
Sepsis due to pneumonia
Altered mental status

## 2022-02-04 NOTE — PROGRESS NOTE ADULT - PROBLEM SELECTOR PROBLEM 2
BPH (benign prostatic hyperplasia)
Acute kidney injury superimposed on CKD
Altered mental status
BPH (benign prostatic hyperplasia)
Altered mental status
Altered mental status
2019 novel coronavirus disease (COVID-19)
BPH (benign prostatic hyperplasia)
BPH (benign prostatic hyperplasia)
Altered mental status
BPH (benign prostatic hyperplasia)

## 2022-02-04 NOTE — PROGRESS NOTE ADULT - REASON FOR ADMISSION
sepsis due to pneumonia

## 2022-02-04 NOTE — PROGRESS NOTE ADULT - PROBLEM SELECTOR PLAN 3
Pt has history of BPH   Urology placed waite catheter on 1/26, with significant UOP since    - hold home oxybutynin 5mg, consider restarting once BPs normalize  - c/w Finasteride 5 mg qd  - c/w tamsulosin 0.4 mg qd  - monitor I&Os Urology placed waite catheter on 1/26  - hold home oxybutynin 5mg given delirium   - c/w Finasteride 5 mg qd  - c/w tamsulosin 0.4 mg qd    Plan:  -f/u with urology outpatient

## 2022-02-04 NOTE — PROGRESS NOTE ADULT - PROBLEM SELECTOR PROBLEM 3
Acute kidney injury superimposed on CKD
BPH (benign prostatic hyperplasia)
Acute kidney injury superimposed on CKD
BPH (benign prostatic hyperplasia)
BPH (benign prostatic hyperplasia)
2019 novel coronavirus disease (COVID-19)
Acute kidney injury superimposed on CKD
BPH (benign prostatic hyperplasia)
Acute kidney injury superimposed on CKD

## 2022-02-04 NOTE — PROGRESS NOTE ADULT - PROBLEM SELECTOR PLAN 4
U/S showing R hydro, likely chronic, could be 2/2 obstruction  Creatinine now downtrending   Shepard in place U/S showing R hydro, likely chronic, could be 2/2 obstruction -> waite in place  Creatinine downtrending -> increased 1.18 -> 1.3 on 2/4    Plan:  -c/w waite   -continue to monitor, avoid nephrotoxins

## 2022-02-04 NOTE — PROGRESS NOTE ADULT - PROBLEM SELECTOR PLAN 1
on admission with BP 87/57, WBC 24.53 with neutrophilic predom 87.6%. Lactate 1.0. CXR RLL infiltrate. UA unremarkable. 94% on room air->97% on 2LNC. Right basilar crackles on exam. COVID +, Procal 0.46  MRSA nares positive  On Room Air since 1/26  CRP and ferritin downtrended by 1/27  WBC downtrending but still elevated at 15  CT Chest: Large right lower lobe consolidation consistent with lobar pneumonia. Small to moderate-sized partially loculated right pleural effusion with pleural thickening suspicious for parapneumonic effusion/empyema.  TTE done 2/1 - technically difficult study  IR Pigtail placed RLL 2/2    Plan:  - c/w vanc and zosyn  - f/u pulm recs on admission with BP 87/57, WBC 24.53 with neutrophilic predom 87.6%. Lactate 1.0. CXR RLL infiltrate. UA unremarkable. 94% on room air->97% on 2LNC. Right basilar crackles on exam. COVID +, Procal 0.46  MRSA nares positive  On Room Air since 1/26  CRP and ferritin downtrended by 1/27  WBC downtrending but still elevated at 15  CT Chest: Large right lower lobe consolidation consistent with lobar pneumonia. Small to moderate-sized partially loculated right pleural effusion with pleural thickening suspicious for parapneumonic effusion/empyema.  TTE done 2/1 - technically difficult study  IR Pigtail placed RLL 2/2    Plan:  - Switch vanc and zosyn to levaquin 750mg q48hrs (  - f/u pulm recs Pt admitted with leukocytosis, R sided chest pain, CXR RLL infiltrate. Right basilar crackles on exam. On room air -> 2 L. COVID +  MRSA nares positive  On Room Air since 1/26  CRP and ferritin downtrended by 1/27  WBC downtrended to 12 on 2/4  CT Chest: Large right lower lobe consolidation consistent with lobar pneumonia. Small to moderate-sized partially loculated right pleural effusion with pleural thickening suspicious for parapneumonic effusion/empyema.  IR Pigtail placed RLL 2/2 - removed 2/4, post-removal CXR was clear    Plan:  - Switched vanc/zosyn to levaquin 750mg q48hrs (for renal adjustment) and doxycycline 100mg BID for 5 days (2/4 - 2/9) - (7 days after source control with Chest tube placement on 2/2)  - f/u pulm recs

## 2022-02-04 NOTE — CHART NOTE - NSCHARTNOTEFT_GEN_A_CORE
Right-sided chest tube placed by IR on 2/2/21 was removed without complication. Xeroform dressing with gauze and Tegaderm applied. CXR ordered. IR to review upon completion.    Yaakov Moore MD PGY4 Right-sided chest tube placed by IR on 2/2/21 was removed without complication. Xeroform dressing with gauze and Tegaderm applied.     No pneumothorax on CXR s/p chest tube removal.    IR will sign off.    Yaakov Moore MD PGY4

## 2022-02-04 NOTE — PROGRESS NOTE ADULT - PROBLEM SELECTOR PROBLEM 4
CHF (congestive heart failure)
Acute kidney injury superimposed on CKD
Acute kidney injury superimposed on CKD
CHF (congestive heart failure)
Sepsis due to pneumonia
Acute kidney injury superimposed on CKD
CHF (congestive heart failure)
2019 novel coronavirus disease (COVID-19)
Acute kidney injury superimposed on CKD
CHF (congestive heart failure)
Sepsis due to pneumonia
2019 novel coronavirus disease (COVID-19)
Sepsis due to pneumonia

## 2022-02-04 NOTE — PROGRESS NOTE ADULT - ATTENDING COMMENTS
Patient discussed with resident team and plan of care reviewed. I have personally reviewed all pertinent labs and imaging and performed an independent history and physical. Resident note personally reviewed, and I agree with above resident note with the following additions:    Doing well, monitor chest tube output, management per IR - once chest tube is out should be able to d/c back to [facility].

## 2022-02-04 NOTE — PROGRESS NOTE ADULT - NUTRITIONAL ASSESSMENT
This patient has been assessed with a concern for Malnutrition and has been determined to have a diagnosis/diagnoses of Severe protein-calorie malnutrition.    This patient is being managed with:   Diet DASH/TLC-  Sodium & Cholesterol Restricted  Supplement Feeding Modality:  Oral  Ensure Enlive Cans or Servings Per Day:  1       Frequency:  Three Times a day  Entered: Jan 23 2022  2:11PM    
This patient has been assessed with a concern for Malnutrition and has been determined to have a diagnosis/diagnoses of Severe protein-calorie malnutrition.    This patient is being managed with:   Diet Minced and Moist-  Mildly Thick Liquids (MILDTHICKLIQS)  Entered: Jan 26 2022  2:56PM    
This patient has been assessed with a concern for Malnutrition and has been determined to have a diagnosis/diagnoses of Severe protein-calorie malnutrition.    This patient is being managed with:   Diet Minced and Moist-  Mildly Thick Liquids (MILDTHICKLIQS)  Entered: Jan 29 2022  8:52AM    
This patient has been assessed with a concern for Malnutrition and has been determined to have a diagnosis/diagnoses of Severe protein-calorie malnutrition.    This patient is being managed with:   Diet NPO-  Entered: Jan 28 2022  6:48PM    
This patient has been assessed with a concern for Malnutrition and has been determined to have a diagnosis/diagnoses of Severe protein-calorie malnutrition.    This patient is being managed with:   Diet NPO-  NPO for Procedure/Test     NPO Start Date: 02-Feb-2022   NPO Start Time: 07:30  Except Medications  Entered: Feb 2 2022  7:27AM    Diet Minced and Moist-  Mildly Thick Liquids (MILDTHICKLIQS)  Entered: Jan 29 2022  8:52AM    
This patient has been assessed with a concern for Malnutrition and has been determined to have a diagnosis/diagnoses of Severe protein-calorie malnutrition.    This patient is being managed with:   Diet NPO-  Entered: Jan 28 2022  6:48PM    
This patient has been assessed with a concern for Malnutrition and has been determined to have a diagnosis/diagnoses of Severe protein-calorie malnutrition.    This patient is being managed with:   Diet Minced and Moist-  Mildly Thick Liquids (MILDTHICKLIQS)  Entered: Jan 29 2022  8:52AM    
This patient has been assessed with a concern for Malnutrition and has been determined to have a diagnosis/diagnoses of Severe protein-calorie malnutrition.    This patient is being managed with:   Diet DASH/TLC-  Sodium & Cholesterol Restricted  Supplement Feeding Modality:  Oral  Ensure Enlive Cans or Servings Per Day:  1       Frequency:  Three Times a day  Entered: Jan 23 2022  2:11PM    
This patient has been assessed with a concern for Malnutrition and has been determined to have a diagnosis/diagnoses of Severe protein-calorie malnutrition.    This patient is being managed with:   Diet DASH/TLC-  Sodium & Cholesterol Restricted  Supplement Feeding Modality:  Oral  Ensure Enlive Cans or Servings Per Day:  1       Frequency:  Three Times a day  Entered: Jan 23 2022  2:11PM    
This patient has been assessed with a concern for Malnutrition and has been determined to have a diagnosis/diagnoses of Severe protein-calorie malnutrition.    This patient is being managed with:   Diet Minced and Moist-  Mildly Thick Liquids (MILDTHICKLIQS)  Entered: Jan 29 2022  8:52AM

## 2022-02-04 NOTE — PROGRESS NOTE ADULT - PROBLEM SELECTOR PLAN 5
on admission with BP 87/57, WBC 24.53 with neutrophilic predom 87.6%. Lactate 1.0. CXR RLL infiltrate. UA unremarkable. 94% on room air->97% on 2LNC. Right basilar crackles on exam. COVID +, Procal 0.46    Plan:  - No need for treatment with DEX or REM  - monitor if need for supplemental oxygen  - Changed lovenox to 25mg BID per AC protocol for COVID pts - currently on HOLD for pigtail placement Covid positive on 1/22, negative on 1/31  Never on more than 2 L Oxygen, on room air since 1/26  Never treated with decadron/remdesivir   CRP and ferritin downtrended by 1/27  WBC downtrended to 12 on 2/4    Plan:  - c/w lovenox to 25mg BID per AC protocol for COVID pts until discharge

## 2022-02-04 NOTE — PROGRESS NOTE ADULT - ATTENDING COMMENTS
c/w antibiotics for complicated, parapneumonic effusion. consider dc pigtail per IR given less than 100cc drainage in the last 24 hours.

## 2022-02-04 NOTE — PROGRESS NOTE ADULT - ATTENDING SUPERVISION STATEMENT
Resident
Resident/Fellow
Resident/Fellow
Resident

## 2022-02-04 NOTE — PROGRESS NOTE ADULT - ASSESSMENT
85 yo M w/ PMH CHF (Unknown EF), HTN, CKD (baseline Cr 1.3), BPH presented from [facility] w/ right-sided CP and back pain along with generalized weakness/poor PO intake, found with sepsis i/s/o COVID PNA along with RLL CAP - downtrending WBC and inflammatory markers, as well as mixed MURRAY, course c/b urinary retention, s/p waite placement, course c/b delirium. 1/28 patient pending discharge but then became unresponsive stroke code called, ICU consult then consulted for concern of inability to protect airway and stroke code called, however patient able to protect airway and CTA head and neck negative. Patient stable for stepdown to [facility] and now with concern of CT chest finding showing small to moderate sized partially loculated right pleural effusion with pleural thickening suspicious for parapneumonic effusion/empyema and pulmonary consulted for management recommendation    #Parapneumonic effusion/empyema  #RLL PNA 2/2 HAP/COVID    Swallow bedside assessment 1/26 showed mild oral and suspected pharyngeal dysphagia, rec minced and moist with mildly thick liquids    IR placed chest tube draining ~550cc serosanguinous fluid    Recommendations:  -IR to remove chest tube  -c/w course of antibiotic (vancomycin and zosyn) 85 yo M w/ PMH CHF (Unknown EF), HTN, CKD (baseline Cr 1.3), BPH presented from [facility] w/ right-sided CP and back pain along with generalized weakness/poor PO intake, found with sepsis i/s/o COVID PNA along with RLL CAP - downtrending WBC and inflammatory markers, as well as mixed MURRAY, course c/b urinary retention, s/p waite placement, course c/b delirium. 1/28 patient pending discharge but then became unresponsive stroke code called, ICU consult then consulted for concern of inability to protect airway and stroke code called, however patient able to protect airway and CTA head and neck negative. Patient stable for stepdown to [facility] and now with concern of CT chest finding showing small to moderate sized partially loculated right pleural effusion with pleural thickening suspicious for parapneumonic effusion/empyema and pulmonary consulted for management recommendation    #Exudative pleural effusion  #RLL PNA 2/2 HAP/COVID    Swallow bedside assessment 1/26 showed mild oral and suspected pharyngeal dysphagia, rec minced and moist with mildly thick liquids    IR placed pigtail draining ~550cc serosanguinous fluid    Recommendations:  -IR to remove pigtail  -c/w course of antibiotic (vancomycin and zosyn) 85 yo M w/ PMH CHF (Unknown EF), HTN, CKD (baseline Cr 1.3), BPH presented from [facility] w/ right-sided CP and back pain along with generalized weakness/poor PO intake, found with sepsis i/s/o COVID PNA along with RLL CAP - downtrending WBC and inflammatory markers, as well as mixed MURRAY, course c/b urinary retention, s/p waite placement, course c/b delirium. 1/28 patient pending discharge but then became unresponsive stroke code called, ICU consult then consulted for concern of inability to protect airway and stroke code called, however patient able to protect airway and CTA head and neck negative. Patient stable for stepdown to [facility] and now with concern of CT chest finding showing small to moderate sized partially loculated right pleural effusion with pleural thickening suspicious for parapneumonic effusion/empyema and pulmonary consulted for management recommendation    #Exudative pleural effusion  #RLL PNA 2/2 HAP/COVID    Swallow bedside assessment 1/26 showed mild oral and suspected pharyngeal dysphagia, rec minced and moist with mildly thick liquids    IR-placed pigtail draining past 24 hours ~60cc serosanguinous fluid  POCUS 2/4 showing trace right sided effusion and no pockets of loculation    Recommendations:  -IR to remove pigtail  -c/w course of antibiotic (vancomycin and zosyn)

## 2022-02-04 NOTE — PROGRESS NOTE ADULT - PROBLEM SELECTOR PLAN 8
F: None  E: Replete K<4, Mg<2  N: DASH/TLC diet  DVT ppx: Lovenox 25mg subq BID - on HOLD for Pigtail placement.  Code: FULL  Dispo: [facility] F: None  E: Replete K<4, Mg<2  N: DASH/TLC diet  DVT ppx: Lovenox 25mg subq BID   Code: FULL  Dispo: [facility]

## 2022-02-04 NOTE — PROGRESS NOTE ADULT - SUBJECTIVE AND OBJECTIVE BOX
***INCOMPLETE****  OVERNIGHT EVENTS:    SUBJECTIVE / INTERVAL HPI: Patient seen and examined at bedside.     ROS: negative unless otherwise stated above.    VITAL SIGNS:  Vital Signs Last 24 Hrs  T(C): 36.7 (04 Feb 2022 05:56), Max: 36.8 (03 Feb 2022 20:21)  T(F): 98.1 (04 Feb 2022 05:56), Max: 98.3 (03 Feb 2022 20:21)  HR: 77 (04 Feb 2022 05:56) (76 - 77)  BP: 95/60 (04 Feb 2022 05:56) (95/60 - 100/61)  BP(mean): --  RR: 18 (04 Feb 2022 05:56) (17 - 18)  SpO2: 93% (04 Feb 2022 05:56) (93% - 96%)    PHYSICAL EXAM:    General: In no apparent distress  HEENT: NC/AT; PERRL, anicteric sclera; MMM  Neck: supple  Cardiovascular: +S1/S2; RRR  Respiratory: CTA B/L; no W/R/R  Gastrointestinal: soft, NT/ND; +BSx4  Extremities: WWP; no edema, clubbing or cyanosis  Vascular: 2+ radial, DP/PT pulses B/L  Neurological: AAOx3; no focal deficits    MEDICATIONS:  MEDICATIONS  (STANDING):  atorvastatin 40 milliGRAM(s) Oral at bedtime  enoxaparin Injectable 25 milliGRAM(s) SubCutaneous every 12 hours  finasteride 5 milliGRAM(s) Oral daily  lidocaine   4% Patch 1 Patch Transdermal daily  melatonin 10 milliGRAM(s) Oral at bedtime  multivitamin 1 Tablet(s) Oral daily  piperacillin/tazobactam IVPB.. 4.5 Gram(s) IV Intermittent every 8 hours  polyethylene glycol 3350 17 Gram(s) Oral two times a day  senna 2 Tablet(s) Oral at bedtime  tamsulosin 0.4 milliGRAM(s) Oral at bedtime  vancomycin  IVPB 1250 milliGRAM(s) IV Intermittent every 24 hours    MEDICATIONS  (PRN):  acetaminophen     Tablet .. 650 milliGRAM(s) Oral every 6 hours PRN Mild Pain (1 - 3), Moderate Pain (4 - 6)      ALLERGIES:  Allergies    No Known Allergies    Intolerances        LABS:                        11.9   12.80 )-----------( 283      ( 03 Feb 2022 09:26 )             38.5     02-03    139  |  104  |  17  ----------------------------<  93  3.8   |  24  |  1.18    Ca    8.7      03 Feb 2022 09:26  Phos  2.5     02-03  Mg     2.1     02-03    TPro  6.7  /  Alb  2.5<L>  /  TBili  0.5  /  DBili  x   /  AST  31  /  ALT  21  /  AlkPhos  108  02-03        CAPILLARY BLOOD GLUCOSE          RADIOLOGY & ADDITIONAL TESTS: Reviewed. OVERNIGHT EVENTS: No acute events overnight    SUBJECTIVE / INTERVAL HPI: Patient seen and examined at bedside. Patient has no new complaints this morning and feel overall ok.    ROS: negative unless otherwise stated above.    VITAL SIGNS:  Vital Signs Last 24 Hrs  T(C): 36.7 (04 Feb 2022 05:56), Max: 36.8 (03 Feb 2022 20:21)  T(F): 98.1 (04 Feb 2022 05:56), Max: 98.3 (03 Feb 2022 20:21)  HR: 77 (04 Feb 2022 05:56) (76 - 77)  BP: 95/60 (04 Feb 2022 05:56) (95/60 - 100/61)  BP(mean): --  RR: 18 (04 Feb 2022 05:56) (17 - 18)  SpO2: 93% (04 Feb 2022 05:56) (93% - 96%)    PHYSICAL EXAM:  General: Nervous and tremulous. Thin.  HEENT: NC/AT; PERRL, anicteric sclera; MMM  Neck: supple  Cardiovascular: +S1/S2; RRR; high pitched systolic murmur  Respiratory: Decreased breath sounds R base.  Gastrointestinal: soft, NT/ND; +BSx4  Extremities: WWP; no edema, clubbing or cyanosis  Vascular: 2+ radial, DP/PT pulses B/L  Neurological: AAOx3; no focal deficits. Odd affect. Hands jerking occasionally.   Shepard in place.  Pigtail right sided chest tube in place, draining clear serous liquid. => REMOVED 2/4 AFTERNOON      MEDICATIONS:  MEDICATIONS  (STANDING):  atorvastatin 40 milliGRAM(s) Oral at bedtime  enoxaparin Injectable 25 milliGRAM(s) SubCutaneous every 12 hours  finasteride 5 milliGRAM(s) Oral daily  lidocaine   4% Patch 1 Patch Transdermal daily  melatonin 10 milliGRAM(s) Oral at bedtime  multivitamin 1 Tablet(s) Oral daily  piperacillin/tazobactam IVPB.. 4.5 Gram(s) IV Intermittent every 8 hours  polyethylene glycol 3350 17 Gram(s) Oral two times a day  senna 2 Tablet(s) Oral at bedtime  tamsulosin 0.4 milliGRAM(s) Oral at bedtime  vancomycin  IVPB 1250 milliGRAM(s) IV Intermittent every 24 hours    MEDICATIONS  (PRN):  acetaminophen     Tablet .. 650 milliGRAM(s) Oral every 6 hours PRN Mild Pain (1 - 3), Moderate Pain (4 - 6)      ALLERGIES:  Allergies    No Known Allergies    Intolerances        LABS:                        11.9   12.80 )-----------( 283      ( 03 Feb 2022 09:26 )             38.5     02-03    139  |  104  |  17  ----------------------------<  93  3.8   |  24  |  1.18    Ca    8.7      03 Feb 2022 09:26  Phos  2.5     02-03  Mg     2.1     02-03    TPro  6.7  /  Alb  2.5<L>  /  TBili  0.5  /  DBili  x   /  AST  31  /  ALT  21  /  AlkPhos  108  02-03        CAPILLARY BLOOD GLUCOSE          RADIOLOGY & ADDITIONAL TESTS: Reviewed.

## 2022-02-05 VITALS
OXYGEN SATURATION: 93 % | RESPIRATION RATE: 18 BRPM | SYSTOLIC BLOOD PRESSURE: 93 MMHG | HEART RATE: 84 BPM | DIASTOLIC BLOOD PRESSURE: 65 MMHG | TEMPERATURE: 98 F

## 2022-02-05 LAB
ANION GAP SERPL CALC-SCNC: 14 MMOL/L — SIGNIFICANT CHANGE UP (ref 5–17)
BUN SERPL-MCNC: 21 MG/DL — SIGNIFICANT CHANGE UP (ref 7–23)
CALCIUM SERPL-MCNC: 8.8 MG/DL — SIGNIFICANT CHANGE UP (ref 8.4–10.5)
CHLORIDE SERPL-SCNC: 106 MMOL/L — SIGNIFICANT CHANGE UP (ref 96–108)
CO2 SERPL-SCNC: 22 MMOL/L — SIGNIFICANT CHANGE UP (ref 22–31)
CREAT SERPL-MCNC: 1.17 MG/DL — SIGNIFICANT CHANGE UP (ref 0.5–1.3)
GLUCOSE SERPL-MCNC: 100 MG/DL — HIGH (ref 70–99)
HCT VFR BLD CALC: 40.2 % — SIGNIFICANT CHANGE UP (ref 39–50)
HGB BLD-MCNC: 12.6 G/DL — LOW (ref 13–17)
MCHC RBC-ENTMCNC: 31.3 GM/DL — LOW (ref 32–36)
MCHC RBC-ENTMCNC: 32.2 PG — SIGNIFICANT CHANGE UP (ref 27–34)
MCV RBC AUTO: 102.8 FL — HIGH (ref 80–100)
NRBC # BLD: 0 /100 WBCS — SIGNIFICANT CHANGE UP (ref 0–0)
PLATELET # BLD AUTO: 299 K/UL — SIGNIFICANT CHANGE UP (ref 150–400)
POTASSIUM SERPL-MCNC: 4.4 MMOL/L — SIGNIFICANT CHANGE UP (ref 3.5–5.3)
POTASSIUM SERPL-SCNC: 4.4 MMOL/L — SIGNIFICANT CHANGE UP (ref 3.5–5.3)
RBC # BLD: 3.91 M/UL — LOW (ref 4.2–5.8)
RBC # FLD: 14.8 % — HIGH (ref 10.3–14.5)
SODIUM SERPL-SCNC: 142 MMOL/L — SIGNIFICANT CHANGE UP (ref 135–145)
WBC # BLD: 11.71 K/UL — HIGH (ref 3.8–10.5)
WBC # FLD AUTO: 11.71 K/UL — HIGH (ref 3.8–10.5)

## 2022-02-05 PROCEDURE — 82553 CREATINE MB FRACTION: CPT

## 2022-02-05 PROCEDURE — 71275 CT ANGIOGRAPHY CHEST: CPT

## 2022-02-05 PROCEDURE — 87449 NOS EACH ORGANISM AG IA: CPT

## 2022-02-05 PROCEDURE — 85027 COMPLETE CBC AUTOMATED: CPT

## 2022-02-05 PROCEDURE — U0005: CPT

## 2022-02-05 PROCEDURE — 70496 CT ANGIOGRAPHY HEAD: CPT

## 2022-02-05 PROCEDURE — 82945 GLUCOSE OTHER FLUID: CPT

## 2022-02-05 PROCEDURE — 89051 BODY FLUID CELL COUNT: CPT

## 2022-02-05 PROCEDURE — 84145 PROCALCITONIN (PCT): CPT

## 2022-02-05 PROCEDURE — U0003: CPT

## 2022-02-05 PROCEDURE — 82550 ASSAY OF CK (CPK): CPT

## 2022-02-05 PROCEDURE — 85730 THROMBOPLASTIN TIME PARTIAL: CPT

## 2022-02-05 PROCEDURE — 87040 BLOOD CULTURE FOR BACTERIA: CPT

## 2022-02-05 PROCEDURE — 82962 GLUCOSE BLOOD TEST: CPT

## 2022-02-05 PROCEDURE — 93005 ELECTROCARDIOGRAM TRACING: CPT

## 2022-02-05 PROCEDURE — 95714 VEEG EA 12-26 HR UNMNTR: CPT

## 2022-02-05 PROCEDURE — 87640 STAPH A DNA AMP PROBE: CPT

## 2022-02-05 PROCEDURE — 97530 THERAPEUTIC ACTIVITIES: CPT

## 2022-02-05 PROCEDURE — 82728 ASSAY OF FERRITIN: CPT

## 2022-02-05 PROCEDURE — 70498 CT ANGIOGRAPHY NECK: CPT

## 2022-02-05 PROCEDURE — 93971 EXTREMITY STUDY: CPT

## 2022-02-05 PROCEDURE — 97116 GAIT TRAINING THERAPY: CPT

## 2022-02-05 PROCEDURE — 85379 FIBRIN DEGRADATION QUANT: CPT

## 2022-02-05 PROCEDURE — 71250 CT THORAX DX C-: CPT

## 2022-02-05 PROCEDURE — 96365 THER/PROPH/DIAG IV INF INIT: CPT

## 2022-02-05 PROCEDURE — 97161 PT EVAL LOW COMPLEX 20 MIN: CPT

## 2022-02-05 PROCEDURE — 86140 C-REACTIVE PROTEIN: CPT

## 2022-02-05 PROCEDURE — 80202 ASSAY OF VANCOMYCIN: CPT

## 2022-02-05 PROCEDURE — 74176 CT ABD & PELVIS W/O CONTRAST: CPT

## 2022-02-05 PROCEDURE — 87086 URINE CULTURE/COLONY COUNT: CPT

## 2022-02-05 PROCEDURE — 84100 ASSAY OF PHOSPHORUS: CPT

## 2022-02-05 PROCEDURE — 82570 ASSAY OF URINE CREATININE: CPT

## 2022-02-05 PROCEDURE — 87205 SMEAR GRAM STAIN: CPT

## 2022-02-05 PROCEDURE — 87641 MR-STAPH DNA AMP PROBE: CPT

## 2022-02-05 PROCEDURE — 81001 URINALYSIS AUTO W/SCOPE: CPT

## 2022-02-05 PROCEDURE — 83605 ASSAY OF LACTIC ACID: CPT

## 2022-02-05 PROCEDURE — 71045 X-RAY EXAM CHEST 1 VIEW: CPT

## 2022-02-05 PROCEDURE — 76770 US EXAM ABDO BACK WALL COMP: CPT

## 2022-02-05 PROCEDURE — 32557 INSERT CATH PLEURA W/ IMAGE: CPT

## 2022-02-05 PROCEDURE — 83986 ASSAY PH BODY FLUID NOS: CPT

## 2022-02-05 PROCEDURE — 83880 ASSAY OF NATRIURETIC PEPTIDE: CPT

## 2022-02-05 PROCEDURE — 80048 BASIC METABOLIC PNL TOTAL CA: CPT

## 2022-02-05 PROCEDURE — 36415 COLL VENOUS BLD VENIPUNCTURE: CPT

## 2022-02-05 PROCEDURE — 83735 ASSAY OF MAGNESIUM: CPT

## 2022-02-05 PROCEDURE — 99239 HOSP IP/OBS DSCHRG MGMT >30: CPT

## 2022-02-05 PROCEDURE — 87102 FUNGUS ISOLATION CULTURE: CPT

## 2022-02-05 PROCEDURE — C1729: CPT

## 2022-02-05 PROCEDURE — 0042T: CPT

## 2022-02-05 PROCEDURE — 84300 ASSAY OF URINE SODIUM: CPT

## 2022-02-05 PROCEDURE — 87075 CULTR BACTERIA EXCEPT BLOOD: CPT

## 2022-02-05 PROCEDURE — 87635 SARS-COV-2 COVID-19 AMP PRB: CPT

## 2022-02-05 PROCEDURE — 93321 DOPPLER ECHO F-UP/LMTD STD: CPT

## 2022-02-05 PROCEDURE — 85025 COMPLETE CBC W/AUTO DIFF WBC: CPT

## 2022-02-05 PROCEDURE — 82803 BLOOD GASES ANY COMBINATION: CPT

## 2022-02-05 PROCEDURE — 92610 EVALUATE SWALLOWING FUNCTION: CPT

## 2022-02-05 PROCEDURE — 83615 LACTATE (LD) (LDH) ENZYME: CPT

## 2022-02-05 PROCEDURE — 82042 OTHER SOURCE ALBUMIN QUAN EA: CPT

## 2022-02-05 PROCEDURE — 92526 ORAL FUNCTION THERAPY: CPT

## 2022-02-05 PROCEDURE — 85610 PROTHROMBIN TIME: CPT

## 2022-02-05 PROCEDURE — 84484 ASSAY OF TROPONIN QUANT: CPT

## 2022-02-05 PROCEDURE — 87070 CULTURE OTHR SPECIMN AEROBIC: CPT

## 2022-02-05 PROCEDURE — 99285 EMERGENCY DEPT VISIT HI MDM: CPT

## 2022-02-05 PROCEDURE — 80053 COMPREHEN METABOLIC PANEL: CPT

## 2022-02-05 PROCEDURE — 95700 EEG CONT REC W/VID EEG TECH: CPT

## 2022-02-05 PROCEDURE — 70450 CT HEAD/BRAIN W/O DYE: CPT

## 2022-02-05 PROCEDURE — 87899 AGENT NOS ASSAY W/OPTIC: CPT

## 2022-02-05 PROCEDURE — C1769: CPT

## 2022-02-05 PROCEDURE — 84157 ASSAY OF PROTEIN OTHER: CPT

## 2022-02-05 PROCEDURE — 97110 THERAPEUTIC EXERCISES: CPT

## 2022-02-05 RX ORDER — ENOXAPARIN SODIUM 100 MG/ML
40 INJECTION SUBCUTANEOUS
Qty: 0 | Refills: 0 | DISCHARGE
Start: 2022-02-05 | End: 2022-03-05

## 2022-02-05 RX ADMIN — Medication 1 TABLET(S): at 11:18

## 2022-02-05 RX ADMIN — ENOXAPARIN SODIUM 25 MILLIGRAM(S): 100 INJECTION SUBCUTANEOUS at 09:08

## 2022-02-05 RX ADMIN — Medication 100 MILLIGRAM(S): at 09:12

## 2022-02-05 RX ADMIN — FINASTERIDE 5 MILLIGRAM(S): 5 TABLET, FILM COATED ORAL at 11:18

## 2022-02-05 RX ADMIN — LIDOCAINE 1 PATCH: 4 CREAM TOPICAL at 11:21

## 2022-02-09 DIAGNOSIS — A41.02 SEPSIS DUE TO METHICILLIN RESISTANT STAPHYLOCOCCUS AUREUS: ICD-10-CM
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
## 2022-02-09 DIAGNOSIS — J91.8 PLEURAL EFFUSION IN OTHER CONDITIONS CLASSIFIED ELSEWHERE: ICD-10-CM

## 2022-02-09 DIAGNOSIS — N13.8 OTHER OBSTRUCTIVE AND REFLUX UROPATHY: ICD-10-CM

## 2022-02-09 DIAGNOSIS — A41.89 OTHER SPECIFIED SEPSIS: ICD-10-CM

## 2022-02-09 DIAGNOSIS — E43 UNSPECIFIED SEVERE PROTEIN-CALORIE MALNUTRITION: ICD-10-CM

## 2022-02-09 DIAGNOSIS — U07.1 COVID-19: ICD-10-CM

## 2022-02-09 DIAGNOSIS — F05 DELIRIUM DUE TO KNOWN PHYSIOLOGICAL CONDITION: ICD-10-CM

## 2022-02-09 DIAGNOSIS — N18.9 CHRONIC KIDNEY DISEASE, UNSPECIFIED: ICD-10-CM

## 2022-02-09 DIAGNOSIS — R33.8 OTHER RETENTION OF URINE: ICD-10-CM

## 2022-02-09 DIAGNOSIS — G93.40 ENCEPHALOPATHY, UNSPECIFIED: ICD-10-CM

## 2022-02-09 DIAGNOSIS — N17.9 ACUTE KIDNEY FAILURE, UNSPECIFIED: ICD-10-CM

## 2022-02-09 DIAGNOSIS — I13.0 HYPERTENSIVE HEART AND CHRONIC KIDNEY DISEASE WITH HEART FAILURE AND STAGE 1 THROUGH STAGE 4 CHRONIC KIDNEY DISEASE, OR UNSPECIFIED CHRONIC KIDNEY DISEASE: ICD-10-CM

## 2022-02-09 DIAGNOSIS — N40.1 BENIGN PROSTATIC HYPERPLASIA WITH LOWER URINARY TRACT SYMPTOMS: ICD-10-CM

## 2022-02-09 DIAGNOSIS — J15.212 PNEUMONIA DUE TO METHICILLIN RESISTANT STAPHYLOCOCCUS AUREUS: ICD-10-CM

## 2022-02-09 DIAGNOSIS — E86.1 HYPOVOLEMIA: ICD-10-CM

## 2022-02-09 DIAGNOSIS — J12.82 PNEUMONIA DUE TO CORONAVIRUS DISEASE 2019: ICD-10-CM

## 2022-02-09 DIAGNOSIS — J18.9 PNEUMONIA, UNSPECIFIED ORGANISM: ICD-10-CM
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
## 2022-02-09 DIAGNOSIS — I50.9 HEART FAILURE, UNSPECIFIED: ICD-10-CM

## 2022-02-09 DIAGNOSIS — Z78.1 PHYSICAL RESTRAINT STATUS: ICD-10-CM

## 2022-02-09 DIAGNOSIS — R56.9 UNSPECIFIED CONVULSIONS: ICD-10-CM

## 2022-02-09 DIAGNOSIS — Z91.19 PATIENT'S NONCOMPLIANCE WITH OTHER MEDICAL TREATMENT AND REGIMEN: ICD-10-CM

## 2022-02-09 DIAGNOSIS — E78.5 HYPERLIPIDEMIA, UNSPECIFIED: ICD-10-CM

## 2022-02-09 DIAGNOSIS — R13.13 DYSPHAGIA, PHARYNGEAL PHASE: ICD-10-CM

## 2022-02-09 DIAGNOSIS — T80.1XXA VASCULAR COMPLICATIONS FOLLOWING INFUSION, TRANSFUSION AND THERAPEUTIC INJECTION, INITIAL ENCOUNTER: ICD-10-CM

## 2022-02-09 LAB
CULTURE RESULTS: NO GROWTH — SIGNIFICANT CHANGE UP
SPECIMEN SOURCE: SIGNIFICANT CHANGE UP

## 2022-02-17 ENCOUNTER — APPOINTMENT (OUTPATIENT)
Dept: UROLOGY | Facility: CLINIC | Age: 87
End: 2022-02-17

## 2022-03-05 LAB
CULTURE RESULTS: SIGNIFICANT CHANGE UP
SPECIMEN SOURCE: SIGNIFICANT CHANGE UP

## 2022-04-19 NOTE — PROCEDURE NOTE - NSTIMEOUT_GEN_A_CORE
Robert was seen in the emergency department today with a laceration to her left eyebrow  I was able to close laceration with skin glue  As we discussed please be sure not describe the eyebrow ever the next few days, please keep Robert from picking up the glue  It will fall out on its own over the next few days  The only reason that she may need to come back to emergency department is if she develops significant symptoms of infection which would include pus draining from the wound and significant swelling and pain over the area  Otherwise please treat mild pain with Tylenol at home  Follow-up with her pediatrician 
Patient's first and last name, , procedure, and correct site confirmed prior to the start of procedure.
Patient's first and last name, , procedure, and correct site confirmed prior to the start of procedure.

## 2022-06-03 LAB — NEUTROPHILS-BODY FLUID: 68 % — SIGNIFICANT CHANGE UP

## 2022-11-04 NOTE — PROGRESS NOTE ADULT - ASSESSMENT
87 yo M w/ PMH CHF (Unknown EF), HTN, CKD (baseline Cr 1.3), BPH presented from [facility] w/ right-sided CP and back bharat along with generalized weakness/poor PO intake, found with sepsis i/s/o asymptomatic COVID and MRSA PNA - downtrending WBC and inflammatory markers, as well as mixed MURRAY, course c/b urinary retention and delirium  Malnutrition... Inadequate Protein Energy Intake

## 2023-01-05 ENCOUNTER — EMERGENCY (EMERGENCY)
Facility: HOSPITAL | Age: 88
LOS: 1 days | Discharge: HOME | End: 2023-01-05
Attending: STUDENT IN AN ORGANIZED HEALTH CARE EDUCATION/TRAINING PROGRAM | Admitting: STUDENT IN AN ORGANIZED HEALTH CARE EDUCATION/TRAINING PROGRAM
Payer: MEDICARE

## 2023-01-05 VITALS
TEMPERATURE: 98 F | WEIGHT: 179.9 LBS | RESPIRATION RATE: 18 BRPM | HEIGHT: 71 IN | OXYGEN SATURATION: 93 % | DIASTOLIC BLOOD PRESSURE: 65 MMHG | HEART RATE: 83 BPM | SYSTOLIC BLOOD PRESSURE: 100 MMHG

## 2023-01-05 DIAGNOSIS — K80.20 CALCULUS OF GALLBLADDER WITHOUT CHOLECYSTITIS WITHOUT OBSTRUCTION: ICD-10-CM

## 2023-01-05 DIAGNOSIS — K57.30 DIVERTICULOSIS OF LARGE INTESTINE WITHOUT PERFORATION OR ABSCESS WITHOUT BLEEDING: ICD-10-CM

## 2023-01-05 DIAGNOSIS — I73.9 PERIPHERAL VASCULAR DISEASE, UNSPECIFIED: ICD-10-CM

## 2023-01-05 DIAGNOSIS — J98.11 ATELECTASIS: ICD-10-CM

## 2023-01-05 DIAGNOSIS — Z87.19 PERSONAL HISTORY OF OTHER DISEASES OF THE DIGESTIVE SYSTEM: ICD-10-CM

## 2023-01-05 DIAGNOSIS — N32.89 OTHER SPECIFIED DISORDERS OF BLADDER: ICD-10-CM

## 2023-01-05 DIAGNOSIS — I13.0 HYPERTENSIVE HEART AND CHRONIC KIDNEY DISEASE WITH HEART FAILURE AND STAGE 1 THROUGH STAGE 4 CHRONIC KIDNEY DISEASE, OR UNSPECIFIED CHRONIC KIDNEY DISEASE: ICD-10-CM

## 2023-01-05 DIAGNOSIS — E78.5 HYPERLIPIDEMIA, UNSPECIFIED: ICD-10-CM

## 2023-01-05 DIAGNOSIS — Z98.890 OTHER SPECIFIED POSTPROCEDURAL STATES: Chronic | ICD-10-CM
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
## 2023-01-05 DIAGNOSIS — I70.0 ATHEROSCLEROSIS OF AORTA: ICD-10-CM

## 2023-01-05 DIAGNOSIS — N20.0 CALCULUS OF KIDNEY: ICD-10-CM

## 2023-01-05 DIAGNOSIS — I25.10 ATHEROSCLEROTIC HEART DISEASE OF NATIVE CORONARY ARTERY WITHOUT ANGINA PECTORIS: ICD-10-CM

## 2023-01-05 DIAGNOSIS — Z87.891 PERSONAL HISTORY OF NICOTINE DEPENDENCE: ICD-10-CM
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
## 2023-01-05 DIAGNOSIS — D72.829 ELEVATED WHITE BLOOD CELL COUNT, UNSPECIFIED: ICD-10-CM

## 2023-01-05 DIAGNOSIS — R31.9 HEMATURIA, UNSPECIFIED: ICD-10-CM

## 2023-01-05 DIAGNOSIS — N39.0 URINARY TRACT INFECTION, SITE NOT SPECIFIED: ICD-10-CM

## 2023-01-05 DIAGNOSIS — N18.9 CHRONIC KIDNEY DISEASE, UNSPECIFIED: ICD-10-CM

## 2023-01-05 DIAGNOSIS — R82.998 OTHER ABNORMAL FINDINGS IN URINE: ICD-10-CM

## 2023-01-05 DIAGNOSIS — Z79.01 LONG TERM (CURRENT) USE OF ANTICOAGULANTS: ICD-10-CM

## 2023-01-05 DIAGNOSIS — D73.4 CYST OF SPLEEN: ICD-10-CM

## 2023-01-05 DIAGNOSIS — T83.511A INFECTION AND INFLAMMATORY REACTION DUE TO INDWELLING URETHRAL CATHETER, INITIAL ENCOUNTER: ICD-10-CM

## 2023-01-05 DIAGNOSIS — I50.9 HEART FAILURE, UNSPECIFIED: ICD-10-CM

## 2023-01-05 DIAGNOSIS — M41.9 SCOLIOSIS, UNSPECIFIED: ICD-10-CM

## 2023-01-05 DIAGNOSIS — N40.0 BENIGN PROSTATIC HYPERPLASIA WITHOUT LOWER URINARY TRACT SYMPTOMS: ICD-10-CM

## 2023-01-05 DIAGNOSIS — N28.1 CYST OF KIDNEY, ACQUIRED: ICD-10-CM
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
## 2023-01-05 LAB
ALBUMIN SERPL ELPH-MCNC: 3.5 G/DL — SIGNIFICANT CHANGE UP (ref 3.3–5)
ALP SERPL-CCNC: 75 U/L — SIGNIFICANT CHANGE UP (ref 40–120)
ALT FLD-CCNC: 14 U/L — SIGNIFICANT CHANGE UP (ref 10–45)
ANION GAP SERPL CALC-SCNC: 10 MMOL/L — SIGNIFICANT CHANGE UP (ref 5–17)
APPEARANCE UR: ABNORMAL
APTT BLD: 30.3 SEC — SIGNIFICANT CHANGE UP (ref 27.5–35.5)
AST SERPL-CCNC: 19 U/L — SIGNIFICANT CHANGE UP (ref 10–40)
BACTERIA # UR AUTO: PRESENT /HPF
BILIRUB SERPL-MCNC: 0.3 MG/DL — SIGNIFICANT CHANGE UP (ref 0.2–1.2)
BILIRUB UR-MCNC: ABNORMAL
BLD GP AB SCN SERPL QL: NEGATIVE — SIGNIFICANT CHANGE UP
BUN SERPL-MCNC: 30 MG/DL — HIGH (ref 7–23)
CALCIUM SERPL-MCNC: 9.2 MG/DL — SIGNIFICANT CHANGE UP (ref 8.4–10.5)
CHLORIDE SERPL-SCNC: 103 MMOL/L — SIGNIFICANT CHANGE UP (ref 96–108)
CO2 SERPL-SCNC: 25 MMOL/L — SIGNIFICANT CHANGE UP (ref 22–31)
COLOR SPEC: ABNORMAL
CREAT SERPL-MCNC: 1.02 MG/DL — SIGNIFICANT CHANGE UP (ref 0.5–1.3)
DIFF PNL FLD: ABNORMAL
EGFR: 71 ML/MIN/1.73M2 — SIGNIFICANT CHANGE UP
EPI CELLS # UR: SIGNIFICANT CHANGE UP /HPF (ref 0–5)
GLUCOSE SERPL-MCNC: 105 MG/DL — HIGH (ref 70–99)
GLUCOSE UR QL: NEGATIVE — SIGNIFICANT CHANGE UP
HCT VFR BLD CALC: 36.7 % — LOW (ref 39–50)
HGB BLD-MCNC: 11.7 G/DL — LOW (ref 13–17)
INR BLD: 1.15 — SIGNIFICANT CHANGE UP (ref 0.88–1.16)
KETONES UR-MCNC: ABNORMAL MG/DL
LEUKOCYTE ESTERASE UR-ACNC: ABNORMAL
MCHC RBC-ENTMCNC: 31.2 PG — SIGNIFICANT CHANGE UP (ref 27–34)
MCHC RBC-ENTMCNC: 31.9 GM/DL — LOW (ref 32–36)
MCV RBC AUTO: 97.9 FL — SIGNIFICANT CHANGE UP (ref 80–100)
NITRITE UR-MCNC: POSITIVE
NRBC # BLD: 0 /100 WBCS — SIGNIFICANT CHANGE UP (ref 0–0)
PH UR: 7 — SIGNIFICANT CHANGE UP (ref 5–8)
PLATELET # BLD AUTO: 236 K/UL — SIGNIFICANT CHANGE UP (ref 150–400)
POTASSIUM SERPL-MCNC: 4.3 MMOL/L — SIGNIFICANT CHANGE UP (ref 3.5–5.3)
POTASSIUM SERPL-SCNC: 4.3 MMOL/L — SIGNIFICANT CHANGE UP (ref 3.5–5.3)
PROT SERPL-MCNC: 6.5 G/DL — SIGNIFICANT CHANGE UP (ref 6–8.3)
PROT UR-MCNC: >=300 MG/DL
PROTHROM AB SERPL-ACNC: 13.7 SEC — HIGH (ref 10.5–13.4)
RBC # BLD: 3.75 M/UL — LOW (ref 4.2–5.8)
RBC # FLD: 15.9 % — HIGH (ref 10.3–14.5)
RBC CASTS # UR COMP ASSIST: ABNORMAL /HPF
RH IG SCN BLD-IMP: POSITIVE — SIGNIFICANT CHANGE UP
SODIUM SERPL-SCNC: 138 MMOL/L — SIGNIFICANT CHANGE UP (ref 135–145)
SP GR SPEC: 1.02 — SIGNIFICANT CHANGE UP (ref 1–1.03)
UROBILINOGEN FLD QL: 1 E.U./DL — SIGNIFICANT CHANGE UP
WBC # BLD: 19.6 K/UL — HIGH (ref 3.8–10.5)
WBC # FLD AUTO: 19.6 K/UL — HIGH (ref 3.8–10.5)
WBC UR QL: > 10 /HPF

## 2023-01-05 PROCEDURE — 99285 EMERGENCY DEPT VISIT HI MDM: CPT

## 2023-01-05 RX ORDER — MORPHINE SULFATE 50 MG/1
2 CAPSULE, EXTENDED RELEASE ORAL ONCE
Refills: 0 | Status: DISCONTINUED | OUTPATIENT
Start: 2023-01-05 | End: 2023-01-05

## 2023-01-05 RX ORDER — CEFTRIAXONE 500 MG/1
1000 INJECTION, POWDER, FOR SOLUTION INTRAMUSCULAR; INTRAVENOUS ONCE
Refills: 0 | Status: COMPLETED | OUTPATIENT
Start: 2023-01-05 | End: 2023-01-05

## 2023-01-05 RX ADMIN — MORPHINE SULFATE 2 MILLIGRAM(S): 50 CAPSULE, EXTENDED RELEASE ORAL at 20:32

## 2023-01-05 RX ADMIN — CEFTRIAXONE 100 MILLIGRAM(S): 500 INJECTION, POWDER, FOR SOLUTION INTRAMUSCULAR; INTRAVENOUS at 22:16

## 2023-01-05 NOTE — ED PROVIDER NOTE - PATIENT PORTAL LINK FT
You can access the FollowMyHealth Patient Portal offered by [facility] by registering at the following website: [email or web address]. By joining [facility]’s FollowMyHealth portal, you will also be able to view your health information using other applications (apps) compatible with our system.

## 2023-01-05 NOTE — ED PROVIDER NOTE - PHYSICAL EXAMINATION
CONSTITUTIONAL: Elderly, frail, non-toxic; in no apparent distress  HEAD: Normocephalic; atraumatic  EYES: PERRL; EOM intact   ENMT: External appears normal  NECK: Supple; non-tender  CARD: Normal S1, S2; no murmurs, rubs, or gallops  RESP: Normal chest excursion with respiration; breath sounds clear and equal bilaterally  ABD: Soft, non-distended; non-tender. No CVA tenderness. No suprapubic abdominal tenderness.   GUL Indwelling Shepard with parag hematuria draining and dark blood in bag. Genitals atraumatic. Testes are normal and non tender. Mild tenderness to palpation of the glands of the penis.  EXT: Normal ROM in all four extremities; non-tender to palpation  SKIN: Warm, dry, no rash  NEURO:  No focal neurological deficiencies.

## 2023-01-05 NOTE — ED PROVIDER NOTE - PROGRESS NOTE DETAILS
UTI noted on UA. No s/s of sepsis. Uro examined patient. Hematuria resolved, rpt UA sent. OK for DC. Pt to f/u w Dr. Luz in 2 weeks.

## 2023-01-05 NOTE — ED ADULT TRIAGE NOTE - CHIEF COMPLAINT QUOTE
pt BIBEMS from NH due to hematuria in waite cather. EMS unsure of how long, pt denies any discomfort or bladder pressure. Dark red blood noted in waite catheter tube.

## 2023-01-05 NOTE — ED PROVIDER NOTE - NS ED ROS FT
CONSTITUTIONAL: No fever, no chills, no fatigue  EYES: No eye redness, no visual changes  ENT: No ear pain, no sore throat  CARDIOVASCULAR: No chest pain, no palpitations  RESPIRATORY: No cough, no SOB  GI: No abdominal pain, no nausea, no vomiting, no constipation, no diarrhea  GENITOURINARY: No dysuria, no frequency, + hematuria +penis pain   MUSCULOSKELETAL: No backpain, no joint pain, no myalgias  SKIN: No rash, no peripheral edema  NEURO: No headache, no confusion    ALL OTHER SYSTEMS NEGATIVE.

## 2023-01-05 NOTE — ED PROVIDER NOTE - CLINICAL SUMMARY MEDICAL DECISION MAKING FREE TEXT BOX
Sid hematuria with indwelling Shepard. Penile pain possibly traumatic vs other cause of hematuria. Will send UA and labs to rule out anemia or coagulapathy. Elevated WBC noted on labs. Suspect infection. Urology consulted, possible CBI. Pt it hemodynamically stable in the ED.

## 2023-01-05 NOTE — ED PROVIDER NOTE - OBJECTIVE STATEMENT
86 y/o M with a PMHx of BPH, indwelling Shepard catheter, CKD, CAD, HTN, CHF presents from [facility] rehab where he has been a resident for years who has parag hematuria in Shepard catheter and penile pain. Pt reports noticing hematuria today. Pt denies bladder or abdominal discomfort, flank pain. Pt is c/o intermittent pain in penis. Unknown when Shepard catheter was last replaced. Pt has no fever chills or any other complaints.

## 2023-01-05 NOTE — CONSULT NOTE ADULT - SUBJECTIVE AND OBJECTIVE BOX
88 y/o M with a PMHx of BPH, indwelling Waite catheter, CKD, CAD, HTN, CHF, former smoker presents from [facility] where he has been a resident for years who has parag hematuria in Waite catheter and penile pain. Pt reports noticing hematuria today. Pt denies bladder or abdominal discomfort, flank pain. Pt is c/o intermittent pain in penis. Unknown when Waite catheter was last replaced. Pt has no fever chills or any other complaints.     consulted for hematuria. pt is poor historian but states hematuria started last night. Denies fevers, chills, nausea, vomiting, dysuria. + passing clots. Does not know when catheter was last exchanged, does not know if he is followed by Urologist.    Assessed at bedside. 20fr waite catheter draining merlot colored urine.  Removed catheter. Exchanged for 20 fr six eyes catheter; irrigated with 3L sterile water. Removed approx 200cc old blood clot. Irrigated to clear light pink. Exchanged with 22 fr 3 way catheter. reassessed one hour later, urine color remains unchanged.      Vital Signs Last 24 Hrs  T(C): 36.7 (05 Jan 2023 18:34), Max: 36.7 (05 Jan 2023 18:34)  T(F): 98.1 (05 Jan 2023 18:34), Max: 98.1 (05 Jan 2023 18:34)  HR: 83 (05 Jan 2023 18:34) (83 - 83)  BP: 100/65 (05 Jan 2023 18:34) (100/65 - 100/65)  BP(mean): --  RR: 18 (05 Jan 2023 18:34) (18 - 18)  SpO2: 93% (05 Jan 2023 18:34) (93% - 93%)    Parameters below as of 05 Jan 2023 18:34  Patient On (Oxygen Delivery Method): room air      I&O's Summary      PE:  Gen: awake and alert  Abd: nontender, nondistended  : no suprapubic/CVAT. FC intact draining light pink urine    LABS:                        11.7   19.60 )-----------( 236      ( 05 Jan 2023 19:57 )             36.7     01-05    138  |  103  |  30<H>  ----------------------------<  105<H>  4.3   |  25  |  1.02    Ca    9.2      05 Jan 2023 19:57    TPro  6.5  /  Alb  3.5  /  TBili  0.3  /  DBili  x   /  AST  19  /  ALT  14  /  AlkPhos  75  01-05    PT/INR - ( 05 Jan 2023 20:33 )   PT: 13.7 sec;   INR: 1.15          PTT - ( 05 Jan 2023 20:33 )  PTT:30.3 sec  Cultures      A/P 86 y/o M with a PMHx of BPH, indwelling Waite catheter, CKD, CAD, HTN, CHF, former smoker presents from [facility] where he has been a resident for years who has parag hematuria in Waite catheter and penile pain. Pt reports noticing hematuria today. Pt denies bladder or abdominal discomfort, flank pain. Pt is c/o intermittent pain in penis. Unknown when Waite catheter was last replaced. Pt has no fever chills or any other complaints.     consulted for hematuria. pt is poor historian but states hematuria started last night. Denies fevers, chills, nausea, vomiting, dysuria. + passing clots. Does not know when catheter was last exchanged, does not know if he is followed by Urologist.    Assessed at bedside. 20fr waite catheter draining merlot colored urine.  Removed catheter. Exchanged for 20 fr six eyes catheter; irrigated with 3L sterile water. Removed approx 200cc old blood clot. Irrigated to clear light pink. Exchanged with 22 fr 3 way catheter. reassessed 2 hours later, urine remains clear yellow.       Vital Signs Last 24 Hrs  T(C): 36.7 (05 Jan 2023 18:34), Max: 36.7 (05 Jan 2023 18:34)  T(F): 98.1 (05 Jan 2023 18:34), Max: 98.1 (05 Jan 2023 18:34)  HR: 83 (05 Jan 2023 18:34) (83 - 83)  BP: 100/65 (05 Jan 2023 18:34) (100/65 - 100/65)  BP(mean): --  RR: 18 (05 Jan 2023 18:34) (18 - 18)  SpO2: 93% (05 Jan 2023 18:34) (93% - 93%)    Parameters below as of 05 Jan 2023 18:34  Patient On (Oxygen Delivery Method): room air      I&O's Summary      PE:  Gen: awake and alert  Abd: nontender, nondistended  : no suprapubic/CVAT. FC intact draining light pink urine    LABS:                        11.7   19.60 )-----------( 236      ( 05 Jan 2023 19:57 )             36.7     01-05    138  |  103  |  30<H>  ----------------------------<  105<H>  4.3   |  25  |  1.02    Ca    9.2      05 Jan 2023 19:57    TPro  6.5  /  Alb  3.5  /  TBili  0.3  /  DBili  x   /  AST  19  /  ALT  14  /  AlkPhos  75  01-05    PT/INR - ( 05 Jan 2023 20:33 )   PT: 13.7 sec;   INR: 1.15          PTT - ( 05 Jan 2023 20:33 )  PTT:30.3 sec  Cultures      < from: CT Abdomen and Pelvis Urogram w/wo IV Cont (01.06.23 @ 00:16) >  FINDINGS:  Lungs: Linear scarring or atelectasis both lung bases.  Coronary arteries: Coronary artery calcifications.    Liver: Normal. No mass.  Gallbladder and bile ducts: Stones at the gallbladder.  Pancreas: Normal. No ductal dilation.  Spleen: 10 mm splenic cyst.  Adrenal glands: Normal. No mass.  Kidneys and ureters: Nonobstructing calyceal stones both kidneys. No  hydronephrosis. No ureteral stones. Simple cysts both kidneys largest 16   mm.  Stable 13 mm hyperdense lesion inferior pole left kidney which appears to  enhance on arterial phase images suggestive of a solid lesion.  Stomach and bowel: Diffuse wall thickening of the stomach suggestive of a  nonspecific gastritis, versus artifact related to underdistention. Colonic  diverticula present. No evidence of acute diverticulitis at this time.   Above  average stool throughout the colon. No evidence of intestinal perforation   or  obstruction.  Appendix: No evidence of appendicitis.    Intraperitoneal space: Unremarkable. No free air. No significant fluid  collection.  Vasculature: Aorta demonstrates mild atherosclerotic calcification.  Lymph nodes: Unremarkable. No enlarged lymph nodes.  Urinary bladder: Bladder decompressed by a Waite catheter. Small amount of  intraluminal air consistent with instrumentation. Diffuse bladder wall  thickening, worse at the anterior aspect of the bladder similar to   previous.  Reproductive: Hypertrophy of the prostate.  Bones/joints: Thoracolumbar scoliosis.  Soft tissues: Unremarkable.    IMPRESSION:  1. Hypertrophy of the prostate.  2. Bladder decompressed by a Waite catheter. Small amount of intraluminal   air  consistent with instrumentation. Diffuse bladder wall thickening, worse   at the  anterior aspect of the bladder similar to previous.  3. Stable 13 mm hyperdense lesion inferior pole left kidney which appears   to  enhance on arterial phase images suggestive of a solid lesion.  4. Cholelithiasis.  5. Diffuse wall thickening of the stomach suggestive of a nonspecific  gastritis, versus artifact related to underdistention.      < end of copied text >

## 2023-01-05 NOTE — ED ADULT NURSE NOTE - OBJECTIVE STATEMENT
Pt received aaox3 c/o dark red urine draining from Shepard catheter. Pt endorses intermittent lower abd pain and pain at he tip of the penis. Shepard still draining well, no large clots noted. Pt denies any fever, chills, dizziness, N/V/D, SOB, or CP.

## 2023-01-05 NOTE — ED PROVIDER NOTE - NSFOLLOWUPINSTRUCTIONS_ED_ALL_ED_FT
Follow up with your primary medical doctor as soon as possible.  Follow up with Dr. Luz (Urology) in 2 weeks.  Return to the emergency department if your symptoms worsen or if you develop new symptoms.  Take Cefdinir as prescribed for UTI. Complete the course of antibiotics.

## 2023-01-05 NOTE — CONSULT NOTE ADULT - ASSESSMENT
88 y/o M with a PMHx of BPH, indwelling Waite catheter, CKD, CAD, HTN, CHF, former smoker presents from [facility] where he has been a resident for years who has parag hematuria in Waite catheter and penile pain. Pt reports noticing hematuria today. Pt denies bladder or abdominal discomfort, flank pain. Pt is c/o intermittent pain in penis. Unknown when Waite catheter was last replaced. Pt has no fever chills or any other complaints.     consulted for hematuria. pt is poor historian but states hematuria started last night. Denies fevers, chills, nausea, vomiting, dysuria. + passing clots. Does not know when catheter was last exchanged, does not know if he is followed by Urologist.    Assessed at bedside. 20fr waite catheter draining merlot colored urine.  Removed catheter. Exchanged for 20 fr six eyes catheter; irrigated with 3L sterile water. Removed approx 200cc old blood clot. Irrigated to clear light pink. Exchanged with 22 fr 3 way catheter. reassessed 2 hours later, urine remains clear yellow. Pt is afebrile, hemodynamically stable. Labs show leukocytosis (wbc 19.6), stable h/h, Cr 1.02, UA + leukocyte esterase, + nit. CT shows no hydro, bladder decompressed around by waite , and stable 13mm lesion L kidney.    Plan:  -ok to discharge pt home  -no acute  intervention  -antibx per ED provider  -f/u Dr. Luz in 2 weeks     86 y/o M with a PMHx of BPH, indwelling Waite catheter, CKD, CAD, HTN, CHF, former smoker presents from [facility] where he has been a resident for years who has parag hematuria in Waite catheter and penile pain. Pt reports noticing hematuria today. Pt denies bladder or abdominal discomfort, flank pain. Pt is c/o intermittent pain in penis. Unknown when Waite catheter was last replaced. Pt has no fever chills or any other complaints.     consulted for hematuria. pt is poor historian but states hematuria started last night. Denies fevers, chills, nausea, vomiting, dysuria. + passing clots. Does not know when catheter was last exchanged, does not know if he is followed by Urologist.    Assessed at bedside. 20fr waite catheter draining merlot colored urine.  Removed catheter. Exchanged for 20 fr six eyes catheter; irrigated with 3L sterile water. Removed approx 200cc old blood clot. Irrigated to clear light pink. Exchanged with 22 fr 3 way catheter. reassessed 2 hours later, urine remains clear yellow. Pt is afebrile, hemodynamically stable. Labs show leukocytosis (wbc 19.6), stable h/h, Cr 1.02, UA + leukocyte esterase, + nit. CT shows no hydro, bladder decompressed around by waite , and stable 13mm lesion L kidney.    Plan:  -ok to discharge   -no acute  intervention  -antibx per ED provider  -f/u Dr. Luz in 2 weeks

## 2023-01-06 VITALS
SYSTOLIC BLOOD PRESSURE: 101 MMHG | HEART RATE: 75 BPM | OXYGEN SATURATION: 95 % | RESPIRATION RATE: 17 BRPM | DIASTOLIC BLOOD PRESSURE: 63 MMHG | TEMPERATURE: 98 F

## 2023-01-06 LAB
APPEARANCE UR: CLEAR — SIGNIFICANT CHANGE UP
BACTERIA # UR AUTO: PRESENT /HPF
BILIRUB UR-MCNC: NEGATIVE — SIGNIFICANT CHANGE UP
COD CRY URNS QL: ABNORMAL /HPF
COLOR SPEC: YELLOW — SIGNIFICANT CHANGE UP
DIFF PNL FLD: ABNORMAL
EPI CELLS # UR: SIGNIFICANT CHANGE UP /HPF (ref 0–5)
GLUCOSE UR QL: NEGATIVE — SIGNIFICANT CHANGE UP
KETONES UR-MCNC: NEGATIVE — SIGNIFICANT CHANGE UP
LEUKOCYTE ESTERASE UR-ACNC: ABNORMAL
NITRITE UR-MCNC: NEGATIVE — SIGNIFICANT CHANGE UP
PH UR: 6.5 — SIGNIFICANT CHANGE UP (ref 5–8)
PROT UR-MCNC: 100 MG/DL
RBC CASTS # UR COMP ASSIST: > 10 /HPF
SP GR SPEC: <=1.005 — SIGNIFICANT CHANGE UP (ref 1–1.03)
UROBILINOGEN FLD QL: 0.2 E.U./DL — SIGNIFICANT CHANGE UP
WBC UR QL: > 10 /HPF

## 2023-01-06 PROCEDURE — 74178 CT ABD&PLV WO CNTR FLWD CNTR: CPT | Mod: MA

## 2023-01-06 PROCEDURE — 86901 BLOOD TYPING SEROLOGIC RH(D): CPT

## 2023-01-06 PROCEDURE — 85730 THROMBOPLASTIN TIME PARTIAL: CPT

## 2023-01-06 PROCEDURE — 96374 THER/PROPH/DIAG INJ IV PUSH: CPT | Mod: XU

## 2023-01-06 PROCEDURE — 87086 URINE CULTURE/COLONY COUNT: CPT

## 2023-01-06 PROCEDURE — 36415 COLL VENOUS BLD VENIPUNCTURE: CPT

## 2023-01-06 PROCEDURE — 74178 CT ABD&PLV WO CNTR FLWD CNTR: CPT | Mod: 26,MA

## 2023-01-06 PROCEDURE — 85027 COMPLETE CBC AUTOMATED: CPT

## 2023-01-06 PROCEDURE — 85610 PROTHROMBIN TIME: CPT

## 2023-01-06 PROCEDURE — 99284 EMERGENCY DEPT VISIT MOD MDM: CPT | Mod: 25

## 2023-01-06 PROCEDURE — 86850 RBC ANTIBODY SCREEN: CPT

## 2023-01-06 PROCEDURE — 81001 URINALYSIS AUTO W/SCOPE: CPT

## 2023-01-06 PROCEDURE — 86900 BLOOD TYPING SEROLOGIC ABO: CPT

## 2023-01-06 PROCEDURE — 87186 SC STD MICRODIL/AGAR DIL: CPT

## 2023-01-06 PROCEDURE — 51702 INSERT TEMP BLADDER CATH: CPT

## 2023-01-06 PROCEDURE — 80053 COMPREHEN METABOLIC PANEL: CPT

## 2023-01-06 PROCEDURE — 96375 TX/PRO/DX INJ NEW DRUG ADDON: CPT | Mod: XU

## 2023-01-06 RX ORDER — CEFDINIR 250 MG/5ML
1 POWDER, FOR SUSPENSION ORAL
Qty: 14 | Refills: 0
Start: 2023-01-06 | End: 2023-01-12

## 2023-01-07 LAB
CULTURE RESULTS: SIGNIFICANT CHANGE UP
SPECIMEN SOURCE: SIGNIFICANT CHANGE UP

## 2023-01-08 LAB
-  AMPICILLIN/SULBACTAM: SIGNIFICANT CHANGE UP
-  AMPICILLIN: SIGNIFICANT CHANGE UP
-  CEFAZOLIN: SIGNIFICANT CHANGE UP
-  CEFEPIME: SIGNIFICANT CHANGE UP
-  CEFTRIAXONE: SIGNIFICANT CHANGE UP
-  CIPROFLOXACIN: SIGNIFICANT CHANGE UP
-  ERTAPENEM: SIGNIFICANT CHANGE UP
-  GENTAMICIN: SIGNIFICANT CHANGE UP
-  NITROFURANTOIN: SIGNIFICANT CHANGE UP
-  PIPERACILLIN/TAZOBACTAM: SIGNIFICANT CHANGE UP
-  TOBRAMYCIN: SIGNIFICANT CHANGE UP
-  TRIMETHOPRIM/SULFAMETHOXAZOLE: SIGNIFICANT CHANGE UP
CULTURE RESULTS: SIGNIFICANT CHANGE UP
METHOD TYPE: SIGNIFICANT CHANGE UP
ORGANISM # SPEC MICROSCOPIC CNT: SIGNIFICANT CHANGE UP
ORGANISM # SPEC MICROSCOPIC CNT: SIGNIFICANT CHANGE UP
SPECIMEN SOURCE: SIGNIFICANT CHANGE UP

## 2023-05-19 ENCOUNTER — APPOINTMENT (OUTPATIENT)
Dept: OTOLARYNGOLOGY | Facility: CLINIC | Age: 88
End: 2023-05-19
Payer: MEDICARE

## 2023-05-19 VITALS — TEMPERATURE: 97 F | HEART RATE: 58 BPM | SYSTOLIC BLOOD PRESSURE: 115 MMHG | DIASTOLIC BLOOD PRESSURE: 67 MMHG

## 2023-05-19 PROCEDURE — 69210 REMOVE IMPACTED EAR WAX UNI: CPT

## 2023-05-19 PROCEDURE — 99202 OFFICE O/P NEW SF 15 MIN: CPT | Mod: 25

## 2023-05-19 RX ORDER — ASPIRIN 81 MG
6.5 TABLET, DELAYED RELEASE (ENTERIC COATED) ORAL
Qty: 2 | Refills: 3 | Status: ACTIVE | COMMUNITY
Start: 2023-05-19 | End: 1900-01-01

## 2023-05-19 NOTE — ASSESSMENT
[FreeTextEntry1] : 88M here for initial evaluation. He was sent by rehab facility due to diminished hearing. There is no otorrhea, otalgia, tinnitus or vertigo. On exam, both EACs are occluded with thick, hard and adherent cerumen, only partially removed due to pt intolerance.\par Will give debrox for 7 days and RTO for exam and cleaning.

## 2023-05-19 NOTE — HISTORY OF PRESENT ILLNESS
[de-identified] : 88M here for initial evaluation.\par \par Sent by rehab facility due to diminished hearing. There is no otorrhea, otalgia, tinnitus or vertigo.

## 2023-05-19 NOTE — PHYSICAL EXAM
[FreeTextEntry1] : in wheelchair [de-identified] : b/l EAC occluded by thick hard and adherent cerumen partially removed w curet, as pt could not tolerate [Normal] : orientation to person, place, and time: normal

## 2023-06-02 NOTE — DIETITIAN INITIAL EVALUATION ADULT. - IDEAL BODY WEIGHT (LBS)
Refills denied  Has not been seen since 2021 and was instructed to follow-up in 2 months for in person appt    
160.2

## 2023-06-16 ENCOUNTER — APPOINTMENT (OUTPATIENT)
Dept: OTOLARYNGOLOGY | Facility: CLINIC | Age: 88
End: 2023-06-16
Payer: MEDICARE

## 2023-06-16 DIAGNOSIS — H61.23 IMPACTED CERUMEN, BILATERAL: ICD-10-CM

## 2023-06-16 DIAGNOSIS — H91.93 UNSPECIFIED HEARING LOSS, BILATERAL: ICD-10-CM

## 2023-06-16 PROCEDURE — 99212 OFFICE O/P EST SF 10 MIN: CPT | Mod: 25

## 2023-06-16 PROCEDURE — 69210 REMOVE IMPACTED EAR WAX UNI: CPT

## 2023-06-16 NOTE — PHYSICAL EXAM
[FreeTextEntry1] : Ad: EAC occluded w cerumen removed w suction. TM intact, ME clear\par As: EAC occluded w cerumen removed w suction. TM intact, retracted, ME clear [Normal] : orientation to person, place, and time: normal

## 2023-06-16 NOTE — ASSESSMENT
[FreeTextEntry1] : 88M here in followup. He was sent by rehab facility due to diminished hearing. There is no otorrhea, otalgia, tinnitus or vertigo. At prior visit he had severe cerumen unable to be removed due to intolerance and has been on debrox since. On exam, both EACs are occluded with thick cerumen, removed w suction, w improvement in hearing.\par Cerumen disimpacted. Stop drops and maintain dry ears. RTO 1 yr.

## 2023-06-16 NOTE — HISTORY OF PRESENT ILLNESS
[de-identified] : 88M here in followup.\par \par Sent by rehab facility due to diminished hearing. There is no otorrhea, otalgia, tinnitus or vertigo. At prior visit had severe cerumen unable to be removed due to intolerance and has been on debrox since.

## 2023-11-15 NOTE — ED ADULT TRIAGE NOTE - INTERNATIONAL TRAVEL
Size Of Lesion In Cm (Optional): 0.4 Introduction Text (Please End With A Colon): The following procedure was deferred: elecrodessication with Hyfrecator on low setting Detail Level: Detailed X Size Of Lesion In Cm (Optional): 0 No

## 2024-06-14 ENCOUNTER — APPOINTMENT (OUTPATIENT)
Dept: OTOLARYNGOLOGY | Facility: CLINIC | Age: 89
End: 2024-06-14

## 2025-01-26 NOTE — DISCHARGE NOTE NURSING/CASE MANAGEMENT/SOCIAL WORK - NSDPDISTO_GEN_ALL_CORE
Sub-Acute rehab FAMILY HISTORY:  Father  Still living? Yes, Estimated age: 61-70  Diabetes mellitus, Age at diagnosis: 51-60

## 2025-04-30 NOTE — ED ADULT NURSE NOTE - NS ED NURSE LEVEL OF CONSCIOUSNESS MENTAL STATUS
Procedure scheduled/Justin  Procedure: colonoscopy  Dx: screening  Referring: Brock [email or web address] MD  Date: 5/08/25  Time: 8:30am/arrive 7:00am  Hospital: [facility]  Bowel Prep: SuPrep  Patient advised by phone: phone/Bowel prep sent via [facility]  Clearance: none  GLP-1:  Ozempic - patient advised to stop 1 week prior to procedure.   Awake